# Patient Record
Sex: FEMALE | Race: WHITE | NOT HISPANIC OR LATINO | Employment: FULL TIME | URBAN - METROPOLITAN AREA
[De-identification: names, ages, dates, MRNs, and addresses within clinical notes are randomized per-mention and may not be internally consistent; named-entity substitution may affect disease eponyms.]

---

## 2017-01-03 ENCOUNTER — TELEPHONE (OUTPATIENT)
Dept: FAMILY MEDICINE | Facility: CLINIC | Age: 37
End: 2017-01-03

## 2017-01-03 DIAGNOSIS — R10.11 RUQ ABDOMINAL PAIN: Primary | ICD-10-CM

## 2017-01-03 NOTE — TELEPHONE ENCOUNTER
"Dr. Mar: Patient reports that she is \"still having severe pain for one hour after I  Eat.\" She said that she also has a constnat nauseated feeling. Has not thrown up. \"I get hungry so I try to eat and then it starts this whole viscious cycle.\" She reports Pain score of a 6 right after eatting. After an hour it goes down to a 3. She said that the nausea wakes her up every 2 hours at night. She said that her weight this morning was 148 pounds. Eatting bland foods \"like soup and mashed potaotes.\" She says, \"I have had low grade pain  going on for months. Then it flared up.\" She said it has not changed since 12/29/16. Denies fever. AFter giving her the normal ultrasound results, she commented, \"I am so surprised that it did not show anything wrong with my gallbladder.\" How do you advise persistent RUQ pain? Thank you.  Jarred March RN    "

## 2017-01-03 NOTE — TELEPHONE ENCOUNTER
Kesha had a US done on 12/30/16 and still has not heard results.  She is wondering what she should be doing as she is in severe pain yet.  Please call and assess. Thank you..Ghazal Arcos

## 2017-01-04 NOTE — TELEPHONE ENCOUNTER
Please call Kendra. I ordered the scan on the gallbladder. Sorry I did not look at this please let her know i was home and have her schedule this. Pia Mar M.D.;

## 2017-01-06 ENCOUNTER — TRANSFERRED RECORDS (OUTPATIENT)
Dept: HEALTH INFORMATION MANAGEMENT | Facility: CLINIC | Age: 37
End: 2017-01-06

## 2017-01-09 ENCOUNTER — TELEPHONE (OUTPATIENT)
Dept: FAMILY MEDICINE | Facility: CLINIC | Age: 37
End: 2017-01-09

## 2017-01-09 DIAGNOSIS — K82.8 DYSFUNCTIONAL GALLBLADDER: Primary | ICD-10-CM

## 2017-01-09 NOTE — TELEPHONE ENCOUNTER
Dr. Mar, spoke with this pt who had a recent the hepatobiliary scan at [facility] and this pt is very concerned as she has not been able to eat normal food since 12-29-16 at last OV and is very hungry and thinks she has been losing weight. States that the pain is on the right side and hurts all over rating 6/10 and stated the pain exacerbated during the hepatobiliary test when the material was injected and she states she wanted to vomit. Pt states that started as dull ache over past 3 months dn now is becoming difficult to tolerate especially when she eats. Only able to drink fluids and smoothies. Wanting to know direction for course of action. Only taking tylenol for pain and very sparingly. Pt states that the nausea from the pain. Last BM was 2 days ago. Please provide instruction. Pt will come in to be evaluated if needed. Mushtaq

## 2017-01-09 NOTE — TELEPHONE ENCOUNTER
The gallbladder function is low normal. She may have problems with this but it is still functional . Did it hurt when they were doing the procedure? If it did then it is more likely to be the gallbladder.   If you are feeling better right now then I would just have you watch and see if this is coming back.  If it does then I want you to see the surgeon. Pia Mar M.D.;

## 2017-01-09 NOTE — TELEPHONE ENCOUNTER
I spoke to Kendra. She got VERY sick during the HIDA scan and after the infusion. Having a very hard time eating but she  Is very hungry. Will refer to surgery. Pia Mar M.D.;

## 2017-01-09 NOTE — TELEPHONE ENCOUNTER
Kesha is calling asking for her results of her scan she had done on 1/6/17 at [facility].  Results received and forwarded to Dr. Mar for review.  Please review and advise. Thank you..Ghazal Arcos

## 2017-01-09 NOTE — TELEPHONE ENCOUNTER
She is wondering what that means?  What is the next step?  Fady surgery - scope?  Please review and advise.  Thank you..Ghazal Arcos

## 2017-01-11 ENCOUNTER — OFFICE VISIT (OUTPATIENT)
Dept: SURGERY | Facility: CLINIC | Age: 37
End: 2017-01-11
Payer: COMMERCIAL

## 2017-01-11 VITALS
WEIGHT: 154 LBS | RESPIRATION RATE: 12 BRPM | DIASTOLIC BLOOD PRESSURE: 74 MMHG | BODY MASS INDEX: 24.17 KG/M2 | SYSTOLIC BLOOD PRESSURE: 113 MMHG | TEMPERATURE: 98 F | HEIGHT: 67 IN | HEART RATE: 61 BPM

## 2017-01-11 DIAGNOSIS — K82.8 BILIARY DYSKINESIA: Primary | ICD-10-CM

## 2017-01-11 PROCEDURE — 99204 OFFICE O/P NEW MOD 45 MIN: CPT | Performed by: SURGERY

## 2017-01-11 NOTE — PROGRESS NOTES
36-year-old female complaining of several month history of epigastric abdominal pain. Patient also reports nausea. She has tried antacids which did not help. Ultrasound was done showing no stones but a HIDA scan showed a reproduction of symptoms. Her ejection fraction was 40%.she denies history of scleral icterus or changes in bowel habits. Pain is localized epigastrium, 4 out of 10.    Patient Active Problem List   Diagnosis     FAMILY HISTORY OF ENDOCRINE DISEASE( other endo or metabol)     Irratability     CARDIOVASCULAR SCREENING; LDL GOAL LESS THAN 160     Adjustment disorder with anxiety     Bipolar disorder, current episode hypomanic (H)     Hypothyroidism       Past Medical History   Diagnosis Date     Thyroid disease        Past Surgical History   Procedure Laterality Date     C repair cruciate ligament,knee       Left acl repair     C/section, low transverse       , Low Transverse     C/section, low transverse       , Low Transverse     Tubal ligation       With C/S     Mammoplasty augmentation bilateral  2010     Implants.       Family History   Problem Relation Age of Onset     Thyroid Disease Mother      Arthritis Mother      Hypertension Mother      Thyroid Disease Maternal Grandmother      Hypertension Maternal Grandmother      Cardiovascular Maternal Grandfather      HEART DISEASE Maternal Grandfather      Hypertension Maternal Grandfather      DIABETES Mother      Hypertension Paternal Grandmother      CEREBROVASCULAR DISEASE Paternal Grandfather      Anxiety Disorder Mother      Obesity Mother      Obesity Sister        Social History   Substance Use Topics     Smoking status: Former Smoker     Types: Cigarettes     Quit date: 2016     Smokeless tobacco: Never Used     Alcohol Use: Yes      Comment: Once a month         Smoking - Counseled patient on the effects of smoking on surgical issues such as wound healing and infection rates.    History   Drug  Use No       Current Outpatient Prescriptions   Medication Sig Dispense Refill     OMEPRAZOLE PO Take by mouth daily       ZOLMitriptan (ZOMIG) 5 MG tablet Take 1 tablet (5 mg) by mouth at onset of headache for migraine May repeat in 2 hours. Max 2 tablets/24 hours. 6 tablet 1     medroxyPROGESTERone (DEPO-PROVERA) 150 MG/ML injection Inject 1 mL (150 mg) into the muscle every 3 months 3 mL 3     spironolactone (ALDACTONE) 50 MG tablet Take 1 tablet (50 mg) by mouth daily 90 tablet 3     levothyroxine (SYNTHROID, LEVOTHROID) 75 MCG tablet Take 1 tablet (75 mcg) by mouth daily 90 tablet 3     clonazePAM (KLONOPIN) 0.5 MG tablet Take 0.5-1 tablets (0.25-0.5 mg) by mouth nightly as needed for other (insomnia) 20 tablet 0     ammonium lactate (LAC-HYDRIN) 12 % cream Apply topically 2 times daily 120 g 11     lithium 600 MG capsule Take 600 mg by mouth At Bedtime.         Allergies   Allergen Reactions     Erythromycin Nausea and Vomiting     CBC  Recent Labs   Lab Test  12/23/16   1830   WBC  9.9   RBC  4.05   HGB  12.4   HCT  37.8   MCV  93   MCH  30.6   MCHC  32.8   RDW  11.8   PLT  284       BMP  Recent Labs   Lab Test  12/23/16   1830   NA  138   POTASSIUM  3.6   JAIDA  8.7   CHLORIDE  107   CO2  21   BUN  20   CR  0.73   GLC  90       LFTs  Recent Labs   Lab Test  12/23/16   1830   PROTTOTAL  7.2   ALBUMIN  3.6   BILITOTAL  0.4   ALKPHOS  50   AST  17   ALT  15       Results for orders placed or performed during the hospital encounter of 12/30/16   US Abdomen Limited    Narrative    US ABDOMEN LIMITED 12/30/2016 10:29 AM    HISTORY: Right upper quadrant pain.    TECHNIQUE: Limited abdominal ultrasound to the right upper quadrant is  performed.    COMPARISON: None.    FINDINGS: No gallstones, gallbladder wall thickening, or biliary  dilatation are seen. The liver shows no focal finding. The pancreas  and right kidney are within normal limits.        Impression    IMPRESSION:  Normal right upper quadrant ultrasound.   "    MD MABEL GONZALES  Constitutional - Denies fevers, weight loss, malaise, lethargy  Neuro - Denies tremors or seizures  Pulmon - Denies SOB, dyspnea, hemoptysis, chronic cough or use of an inhaler  CV - Denies CP, SOB, lower extremity edema, difficulty w/ stairs, has never used NTG  GI - Denies hematemesis, BRBPR, melena, chronic diarrhea or epigastric pain   - Denies hematuria, difficulty voiding, h/o STDs  Hematology - Denies blood clotting disorders, chronic anemias  Dermatology - No melanomas or skin cancers  Rheumatology - No h/o RA  Pysch - Denies depression, bipolar d/o or schizophrenia    Exam:/74 mmHg  Pulse 61  Temp(Src) 98  F (36.7  C) (Oral)  Resp 12  Ht 1.702 m (5' 7\")  Wt 69.854 kg (154 lb)  BMI 24.11 kg/m2  LMP 12/20/2016 (Exact Date)    General - Alert and Oriented X4, NAD, well nourished  HEENT - Normocephalic, atraumatic, PERRLA, Nose midline, Throat without lesions  Neck - supple, no LAD, Thyroid normal, Carotids without bruits  Lungs - Clear to auscultation bilaterally with good inspiratory effort, no tactile fremitus  CV - Heart RRR, no lift's, thrills, murmurs, rubs, or gallops. Carotid, radial, and femoral pulses 2+ bilaterally  Abdomen - Soft, non-tender, +BS, no hepatosplenomegaly, no palpable masses  Neuro - Full ROM, Strength 5/5 and major muscle groups, sensation intact  Extremities - No cyanosis, clubbing or edema    Assessment and plan: 28-year-old female with biliary dyskinesia. Patient has good candidate for laparoscopic cholecystectomy. Risks benefits alternatives and complications were discussed with the patient including the possibility of infection, bleeding,or bile leak. Patient understood and wished to proceed. PATIENT IS CLEARED FOR SURGERY.    Tani Brenner MD     "

## 2017-01-11 NOTE — Clinical Note
SURGERYPLANNING/SCHEDULING WORKSHEET                              [facility]  [address] [address] [phone]                          Kesha Carter                :  1980  MRN:  [id number]  Phone: [phone] (home)     Same Day Surgery   Surgeon: Tani Brenner MD  Diagnosis:   Biliary dyskinesia  Allergies:  Erythromycin   A preoperative evaluation and physical will be done by this office.   ====================================================  Surgical Procedure:  General Surgery:lap xiang  Length of Procedure:  1 hour  Type of anesthesia:  General  The proposed surgical procedure is considered INTERMEDIATE risk.  Date of Procedure:___17____    Time: __9:30AM___________       Special Equipment: None  Informed Consent Obtained and Signed:  NO  ====================================================  Instructions to Same Day Surgery Staff  Pneumoboots  Preop Antibiotic:  Ancef 2 gm IV  pre-op within one hour prior to incision For > 80 kg  Preop Pain Meds:  None  Preop Orders:  Routine Standing Orders.  ====================================================  Instructions to the patient:  Preop physical exam scheduled (within 30 days or 7 days prior) with:  Dr. BASURTO__________________  Clinic:  ____________________                                         Date______________Time_________________________  Come to the hospital at: ___8:30AM_____________________________  HOME PREPARATION:   Shower with Hibiclens the night before or the morning of surgery, gently cleaning skin from neck to feet  Bathe and brush teeth the morning of surgery.  Take medications with a sip of water the morning of surgery:   Check with DrMontana if taking insulin.  May have  a light meal, toast and clear liquids, up to 8 hrs before surgery  May have clear liquids (liquids one can read through) up to 4 hrs before surgery  NOTHING after 4  hrs before surgery  Stop aspirin 7-10 days before surgery  Stop NSAIDS (Ibuproven, Naproxen, etc) 5 days before surgery  Stop Plavix 7-10 days before surgery  Need to arrange for a     Tani Brenner MD    1/11/2017  This form was electronically signed at chart closure                                                                        Chart Copy

## 2017-01-11 NOTE — NURSING NOTE
"Chief Complaint   Patient presents with     Consult     Gallbladder       Initial /74 mmHg  Pulse 61  Temp(Src) 98  F (36.7  C) (Oral)  Resp 12  Ht 1.702 m (5' 7\")  Wt 69.854 kg (154 lb)  BMI 24.11 kg/m2  LMP 12/20/2016 (Exact Date) Estimated body mass index is 24.11 kg/(m^2) as calculated from the following:    Height as of this encounter: 1.702 m (5' 7\").    Weight as of this encounter: 69.854 kg (154 lb).  BP completed using cuff size: regular    Kari Mcdonough MA      "

## 2017-01-16 ENCOUNTER — ANESTHESIA EVENT (OUTPATIENT)
Dept: SURGERY | Facility: CLINIC | Age: 37
End: 2017-01-16
Payer: COMMERCIAL

## 2017-01-17 ENCOUNTER — HOSPITAL ENCOUNTER (OUTPATIENT)
Facility: CLINIC | Age: 37
Discharge: HOME | End: 2017-01-17
Attending: SURGERY | Admitting: SURGERY
Payer: COMMERCIAL

## 2017-01-17 ENCOUNTER — ANESTHESIA (OUTPATIENT)
Dept: SURGERY | Facility: CLINIC | Age: 37
End: 2017-01-17
Payer: COMMERCIAL

## 2017-01-17 VITALS
TEMPERATURE: 98.6 F | RESPIRATION RATE: 16 BRPM | SYSTOLIC BLOOD PRESSURE: 119 MMHG | BODY MASS INDEX: 23.63 KG/M2 | OXYGEN SATURATION: 100 % | DIASTOLIC BLOOD PRESSURE: 76 MMHG | WEIGHT: 147 LBS | HEART RATE: 59 BPM | HEIGHT: 66 IN

## 2017-01-17 DIAGNOSIS — G89.18 POST-OP PAIN: Primary | ICD-10-CM

## 2017-01-17 PROCEDURE — 25000125 ZZHC RX 250: Performed by: NURSE ANESTHETIST, CERTIFIED REGISTERED

## 2017-01-17 PROCEDURE — 25000125 ZZHC RX 250: Performed by: SURGERY

## 2017-01-17 PROCEDURE — 25000128 H RX IP 250 OP 636: Performed by: NURSE ANESTHETIST, CERTIFIED REGISTERED

## 2017-01-17 PROCEDURE — 25800025 ZZH RX 258: Performed by: NURSE ANESTHETIST, CERTIFIED REGISTERED

## 2017-01-17 PROCEDURE — 36000058 ZZH SURGERY LEVEL 3 EA 15 ADDTL MIN: Performed by: SURGERY

## 2017-01-17 PROCEDURE — 40000306 ZZH STATISTIC PRE PROC ASSESS II: Performed by: SURGERY

## 2017-01-17 PROCEDURE — 71000013 ZZH RECOVERY PHASE 1 LEVEL 1 EA ADDTL HR: Performed by: SURGERY

## 2017-01-17 PROCEDURE — 27210794 ZZH OR GENERAL SUPPLY STERILE: Performed by: SURGERY

## 2017-01-17 PROCEDURE — 88304 TISSUE EXAM BY PATHOLOGIST: CPT | Mod: 26 | Performed by: SURGERY

## 2017-01-17 PROCEDURE — 27110028 ZZH OR GENERAL SUPPLY NON-STERILE: Performed by: SURGERY

## 2017-01-17 PROCEDURE — 25000132 ZZH RX MED GY IP 250 OP 250 PS 637: Performed by: SURGERY

## 2017-01-17 PROCEDURE — 71000012 ZZH RECOVERY PHASE 1 LEVEL 1 FIRST HR: Performed by: SURGERY

## 2017-01-17 PROCEDURE — 37000009 ZZH ANESTHESIA TECHNICAL FEE, EACH ADDTL 15 MIN: Performed by: SURGERY

## 2017-01-17 PROCEDURE — 36000056 ZZH SURGERY LEVEL 3 1ST 30 MIN: Performed by: SURGERY

## 2017-01-17 PROCEDURE — 25000566 ZZH SEVOFLURANE, EA 15 MIN: Performed by: SURGERY

## 2017-01-17 PROCEDURE — 71000027 ZZH RECOVERY PHASE 2 EACH 15 MINS: Performed by: SURGERY

## 2017-01-17 PROCEDURE — 25000132 ZZH RX MED GY IP 250 OP 250 PS 637: Performed by: NURSE ANESTHETIST, CERTIFIED REGISTERED

## 2017-01-17 PROCEDURE — 37000008 ZZH ANESTHESIA TECHNICAL FEE, 1ST 30 MIN: Performed by: SURGERY

## 2017-01-17 PROCEDURE — 88304 TISSUE EXAM BY PATHOLOGIST: CPT | Performed by: SURGERY

## 2017-01-17 PROCEDURE — 47562 LAPAROSCOPIC CHOLECYSTECTOMY: CPT | Performed by: SURGERY

## 2017-01-17 RX ORDER — HYDROCODONE BITARTRATE AND ACETAMINOPHEN 5; 325 MG/1; MG/1
1-2 TABLET ORAL EVERY 6 HOURS PRN
Status: DISCONTINUED | OUTPATIENT
Start: 2017-01-17 | End: 2017-01-17 | Stop reason: HOSPADM

## 2017-01-17 RX ORDER — SCOLOPAMINE TRANSDERMAL SYSTEM 1 MG/1
1 PATCH, EXTENDED RELEASE TRANSDERMAL ONCE
Status: COMPLETED | OUTPATIENT
Start: 2017-01-17 | End: 2017-01-17

## 2017-01-17 RX ORDER — HYDROCODONE BITARTRATE AND ACETAMINOPHEN 5; 325 MG/1; MG/1
1-2 TABLET ORAL EVERY 6 HOURS PRN
Qty: 45 TABLET | Refills: 0 | Status: SHIPPED | OUTPATIENT
Start: 2017-01-17 | End: 2017-04-12

## 2017-01-17 RX ORDER — DEXAMETHASONE SODIUM PHOSPHATE 4 MG/ML
INJECTION, SOLUTION INTRA-ARTICULAR; INTRALESIONAL; INTRAMUSCULAR; INTRAVENOUS; SOFT TISSUE PRN
Status: DISCONTINUED | OUTPATIENT
Start: 2017-01-17 | End: 2017-01-17

## 2017-01-17 RX ORDER — NALOXONE HYDROCHLORIDE 0.4 MG/ML
.1-.4 INJECTION, SOLUTION INTRAMUSCULAR; INTRAVENOUS; SUBCUTANEOUS
Status: DISCONTINUED | OUTPATIENT
Start: 2017-01-17 | End: 2017-01-17 | Stop reason: HOSPADM

## 2017-01-17 RX ORDER — PROPOFOL 10 MG/ML
INJECTION, EMULSION INTRAVENOUS PRN
Status: DISCONTINUED | OUTPATIENT
Start: 2017-01-17 | End: 2017-01-17

## 2017-01-17 RX ORDER — ONDANSETRON 2 MG/ML
INJECTION INTRAMUSCULAR; INTRAVENOUS PRN
Status: DISCONTINUED | OUTPATIENT
Start: 2017-01-17 | End: 2017-01-17

## 2017-01-17 RX ORDER — DEXAMETHASONE SODIUM PHOSPHATE 4 MG/ML
4 INJECTION, SOLUTION INTRA-ARTICULAR; INTRALESIONAL; INTRAMUSCULAR; INTRAVENOUS; SOFT TISSUE EVERY 10 MIN PRN
Status: DISCONTINUED | OUTPATIENT
Start: 2017-01-17 | End: 2017-01-17 | Stop reason: HOSPADM

## 2017-01-17 RX ORDER — LIDOCAINE 40 MG/G
CREAM TOPICAL
Status: DISCONTINUED | OUTPATIENT
Start: 2017-01-17 | End: 2017-01-17 | Stop reason: HOSPADM

## 2017-01-17 RX ORDER — FENTANYL CITRATE 50 UG/ML
INJECTION, SOLUTION INTRAMUSCULAR; INTRAVENOUS PRN
Status: DISCONTINUED | OUTPATIENT
Start: 2017-01-17 | End: 2017-01-17

## 2017-01-17 RX ORDER — FENTANYL CITRATE 50 UG/ML
25-50 INJECTION, SOLUTION INTRAMUSCULAR; INTRAVENOUS
Status: DISCONTINUED | OUTPATIENT
Start: 2017-01-17 | End: 2017-01-17 | Stop reason: HOSPADM

## 2017-01-17 RX ORDER — KETOROLAC TROMETHAMINE 30 MG/ML
INJECTION, SOLUTION INTRAMUSCULAR; INTRAVENOUS PRN
Status: DISCONTINUED | OUTPATIENT
Start: 2017-01-17 | End: 2017-01-17

## 2017-01-17 RX ORDER — CEFAZOLIN SODIUM 2 G/100ML
2 INJECTION, SOLUTION INTRAVENOUS
Status: COMPLETED | OUTPATIENT
Start: 2017-01-17 | End: 2017-01-17

## 2017-01-17 RX ORDER — HYDROXYZINE HYDROCHLORIDE 50 MG/1
50 TABLET, FILM COATED ORAL EVERY 6 HOURS PRN
Status: DISCONTINUED | OUTPATIENT
Start: 2017-01-17 | End: 2017-01-17 | Stop reason: HOSPADM

## 2017-01-17 RX ORDER — ALBUTEROL SULFATE 0.83 MG/ML
2.5 SOLUTION RESPIRATORY (INHALATION) EVERY 4 HOURS PRN
Status: DISCONTINUED | OUTPATIENT
Start: 2017-01-17 | End: 2017-01-17 | Stop reason: HOSPADM

## 2017-01-17 RX ORDER — MEPERIDINE HYDROCHLORIDE 25 MG/ML
12.5 INJECTION INTRAMUSCULAR; INTRAVENOUS; SUBCUTANEOUS
Status: DISCONTINUED | OUTPATIENT
Start: 2017-01-17 | End: 2017-01-17 | Stop reason: HOSPADM

## 2017-01-17 RX ORDER — HYDROXYZINE HYDROCHLORIDE 25 MG/1
25 TABLET, FILM COATED ORAL EVERY 6 HOURS PRN
Status: DISCONTINUED | OUTPATIENT
Start: 2017-01-17 | End: 2017-01-17 | Stop reason: HOSPADM

## 2017-01-17 RX ORDER — HYDROMORPHONE HYDROCHLORIDE 1 MG/ML
.3-.5 INJECTION, SOLUTION INTRAMUSCULAR; INTRAVENOUS; SUBCUTANEOUS EVERY 10 MIN PRN
Status: DISCONTINUED | OUTPATIENT
Start: 2017-01-17 | End: 2017-01-17 | Stop reason: HOSPADM

## 2017-01-17 RX ORDER — SODIUM CHLORIDE, SODIUM LACTATE, POTASSIUM CHLORIDE, CALCIUM CHLORIDE 600; 310; 30; 20 MG/100ML; MG/100ML; MG/100ML; MG/100ML
1000 INJECTION, SOLUTION INTRAVENOUS CONTINUOUS
Status: DISCONTINUED | OUTPATIENT
Start: 2017-01-17 | End: 2017-01-17 | Stop reason: HOSPADM

## 2017-01-17 RX ORDER — SODIUM CHLORIDE, SODIUM LACTATE, POTASSIUM CHLORIDE, CALCIUM CHLORIDE 600; 310; 30; 20 MG/100ML; MG/100ML; MG/100ML; MG/100ML
INJECTION, SOLUTION INTRAVENOUS CONTINUOUS
Status: DISCONTINUED | OUTPATIENT
Start: 2017-01-17 | End: 2017-01-17 | Stop reason: HOSPADM

## 2017-01-17 RX ORDER — BUPIVACAINE HYDROCHLORIDE AND EPINEPHRINE 2.5; 5 MG/ML; UG/ML
INJECTION, SOLUTION INFILTRATION; PERINEURAL PRN
Status: DISCONTINUED | OUTPATIENT
Start: 2017-01-17 | End: 2017-01-17 | Stop reason: HOSPADM

## 2017-01-17 RX ORDER — GLYCOPYRROLATE 0.2 MG/ML
INJECTION, SOLUTION INTRAMUSCULAR; INTRAVENOUS PRN
Status: DISCONTINUED | OUTPATIENT
Start: 2017-01-17 | End: 2017-01-17

## 2017-01-17 RX ORDER — CEFAZOLIN SODIUM 1 G/3ML
1 INJECTION, POWDER, FOR SOLUTION INTRAMUSCULAR; INTRAVENOUS SEE ADMIN INSTRUCTIONS
Status: DISCONTINUED | OUTPATIENT
Start: 2017-01-17 | End: 2017-01-17 | Stop reason: HOSPADM

## 2017-01-17 RX ORDER — ONDANSETRON 2 MG/ML
4 INJECTION INTRAMUSCULAR; INTRAVENOUS EVERY 30 MIN PRN
Status: DISCONTINUED | OUTPATIENT
Start: 2017-01-17 | End: 2017-01-17 | Stop reason: HOSPADM

## 2017-01-17 RX ORDER — ALBUTEROL SULFATE 0.83 MG/ML
2.5 SOLUTION RESPIRATORY (INHALATION)
Status: DISCONTINUED | OUTPATIENT
Start: 2017-01-17 | End: 2017-01-17 | Stop reason: HOSPADM

## 2017-01-17 RX ORDER — ONDANSETRON 4 MG/1
4 TABLET, ORALLY DISINTEGRATING ORAL EVERY 30 MIN PRN
Status: DISCONTINUED | OUTPATIENT
Start: 2017-01-17 | End: 2017-01-17 | Stop reason: HOSPADM

## 2017-01-17 RX ADMIN — HYDROCODONE BITARTRATE AND ACETAMINOPHEN 2 TABLET: 5; 325 TABLET ORAL at 11:59

## 2017-01-17 RX ADMIN — SCOPALAMINE 1 PATCH: 1 PATCH, EXTENDED RELEASE TRANSDERMAL at 11:03

## 2017-01-17 RX ADMIN — ONDANSETRON 4 MG: 2 INJECTION INTRAMUSCULAR; INTRAVENOUS at 10:20

## 2017-01-17 RX ADMIN — MIDAZOLAM HYDROCHLORIDE 1 MG: 1 INJECTION, SOLUTION INTRAMUSCULAR; INTRAVENOUS at 09:09

## 2017-01-17 RX ADMIN — DEXAMETHASONE SODIUM PHOSPHATE 4 MG: 4 INJECTION, SOLUTION INTRAMUSCULAR; INTRAVENOUS at 09:09

## 2017-01-17 RX ADMIN — KETOROLAC TROMETHAMINE 30 MG: 30 INJECTION, SOLUTION INTRAMUSCULAR; INTRAVENOUS at 09:58

## 2017-01-17 RX ADMIN — MIDAZOLAM HYDROCHLORIDE 2 MG: 1 INJECTION, SOLUTION INTRAMUSCULAR; INTRAVENOUS at 09:05

## 2017-01-17 RX ADMIN — MIDAZOLAM HYDROCHLORIDE 2 MG: 1 INJECTION, SOLUTION INTRAMUSCULAR; INTRAVENOUS at 09:02

## 2017-01-17 RX ADMIN — ROCURONIUM BROMIDE 30 MG: 10 INJECTION INTRAVENOUS at 09:09

## 2017-01-17 RX ADMIN — FENTANYL CITRATE 100 MCG: 50 INJECTION, SOLUTION INTRAMUSCULAR; INTRAVENOUS at 09:02

## 2017-01-17 RX ADMIN — HYDROXYZINE HYDROCHLORIDE 50 MG: 50 TABLET, FILM COATED ORAL at 10:38

## 2017-01-17 RX ADMIN — LIDOCAINE HYDROCHLORIDE 1 ML: 10 INJECTION, SOLUTION INFILTRATION; PERINEURAL at 08:27

## 2017-01-17 RX ADMIN — FENTANYL CITRATE 100 MCG: 50 INJECTION, SOLUTION INTRAMUSCULAR; INTRAVENOUS at 09:34

## 2017-01-17 RX ADMIN — HYDROMORPHONE HYDROCHLORIDE 0.5 MG: 1 INJECTION, SOLUTION INTRAMUSCULAR; INTRAVENOUS; SUBCUTANEOUS at 10:55

## 2017-01-17 RX ADMIN — SODIUM CHLORIDE, POTASSIUM CHLORIDE, SODIUM LACTATE AND CALCIUM CHLORIDE: 600; 310; 30; 20 INJECTION, SOLUTION INTRAVENOUS at 09:58

## 2017-01-17 RX ADMIN — GLYCOPYRROLATE 0.2 MG: 0.2 INJECTION, SOLUTION INTRAMUSCULAR; INTRAVENOUS at 09:09

## 2017-01-17 RX ADMIN — FENTANYL CITRATE 100 MCG: 50 INJECTION, SOLUTION INTRAMUSCULAR; INTRAVENOUS at 09:09

## 2017-01-17 RX ADMIN — LIDOCAINE HYDROCHLORIDE 50 MG: 10 INJECTION, SOLUTION INFILTRATION; PERINEURAL at 09:09

## 2017-01-17 RX ADMIN — SODIUM CHLORIDE, POTASSIUM CHLORIDE, SODIUM LACTATE AND CALCIUM CHLORIDE 1000 ML: 600; 310; 30; 20 INJECTION, SOLUTION INTRAVENOUS at 08:27

## 2017-01-17 RX ADMIN — FENTANYL CITRATE 50 MCG: 50 INJECTION INTRAMUSCULAR; INTRAVENOUS at 10:25

## 2017-01-17 RX ADMIN — FENTANYL CITRATE 50 MCG: 50 INJECTION, SOLUTION INTRAMUSCULAR; INTRAVENOUS at 09:14

## 2017-01-17 RX ADMIN — PROPOFOL 200 MG: 10 INJECTION, EMULSION INTRAVENOUS at 09:09

## 2017-01-17 RX ADMIN — CEFAZOLIN SODIUM 2 G: 2 INJECTION, SOLUTION INTRAVENOUS at 09:02

## 2017-01-17 RX ADMIN — ONDANSETRON 4 MG: 2 INJECTION INTRAMUSCULAR; INTRAVENOUS at 09:09

## 2017-01-17 RX ADMIN — HYDROMORPHONE HYDROCHLORIDE 1 MG: 1 INJECTION, SOLUTION INTRAMUSCULAR; INTRAVENOUS; SUBCUTANEOUS at 09:27

## 2017-01-17 NOTE — BRIEF OP NOTE
PreOp Dx: Biliary dyskinesia    PostOp Dx: Same    Procedure: lap xiang    Surgeon: ROSI Brenner    Anesthesia: SHEREEN Diaz CRNA    Findings: Gb without inflammation    IVF: 1000ml    UOP: n/a    EBL: 5ml      Tani Brenner MD

## 2017-01-17 NOTE — IP AVS SNAPSHOT
[facility] PreOP/Phase II    [address] [address]    Phone:  [phone]    Fax:  [phone]                                       After Visit Summary   1/17/2017    Kesha Carter    MRN: [id number]           After Visit Summary Signature Page     I have received my discharge instructions, and my questions have been answered. I have discussed any challenges I see with this plan with the nurse or doctor.    ..........................................................................................................................................  Patient/Patient Representative Signature      ..........................................................................................................................................  Patient Representative Print Name and Relationship to Patient    ..................................................               ................................................  Date                                            Time    ..........................................................................................................................................  Reviewed by Signature/Title    ...................................................              ..............................................  Date                                                            Time

## 2017-01-17 NOTE — IP AVS SNAPSHOT
MRN:[id number]                      After Visit Summary   1/17/2017    Kesha Carter    MRN: [id number]           Thank you!     Thank you for choosing [facility] for your care. Our goal is always to provide you with excellent care. Hearing back from our patients is one way we can continue to improve our services. Please take a few minutes to complete the written survey that you may receive in the mail after you visit with us. Thank you!        Patient Information     Date Of Birth          1980        About your hospital stay     You were admitted on:  January 17, 2017 You last received care in the:  [facility] PreOP/Phase II    You were discharged on:  January 17, 2017       Who to Call     For medical emergencies, please call 911.  For non-urgent questions about your medical care, please call your primary care provider or clinic, [phone]  For questions related to your surgery, please call your surgery clinic        Attending Provider     Provider    Tani Brenner MD       Primary Care Provider Office Phone # Fax #    Pia Mar [phone]       [facility] [address] [address] [address]        Further instructions from your care team       Wash incisions daily with soap and water. Some mild redness or swelling is expected. If draining, cover with dry gauze.    No heavy lifting (less than 30 pounds) for 1 month.    Okay to use ice pack over wound as necessary for comfort.    Use pain medication as necessary but avoid constipating side effects. Ibuprofen okay but avoid Tylenol as your pain medication already contains this.    Diet as tolerated. No restrictions.    Follow up with Dr Brenner in 1-2 weeks.    Most people take the rest of the week off and return to work the following Monday. You may return sooner as pain allows. During your follow-up appointment, Dr. Brenner will give you a formal letter for your work with any restrictions detailed.  " All disability or other such paperwork will be addressed at that time.                                Same Day Surgery Discharge Instructions  Special Precautions After Surgery - Adult    1. It is not unusual to feel lightheaded or faint, up to 24 hours after surgery or while taking pain medication.  If you have these symptoms; sit for a few minutes before standing and have someone assist you when getting up.  2. You should rest and relax for the next 24 hours and must have someone stay with you for at least 24 hours after your discharge.  3. DO NOT DRIVE any vehicle or operate mechanical equipment for 24 hours following the end of your surgery.  DO NOT DRIVE while taking narcotic pain medications that have been prescribed by your physician.  If you had a limb operated on, you must be able to use it fully to drive.  4. DO NOT drink alcoholic beverages for 24 hours following surgery or while taking prescription pain medication.  5. Drink clear liquids (apple juice, ginger ale, broth, 7-Up, etc.).  Progress to your regular diet as you feel able.  6. Any questions call your physician and do not make important decisions for 24 hours.        [facility]:  [phone]     Same Day Surgery [phone], Monday thru Friday 6am-9pm.        Pending Results     Date and Time Order Name Status Description    1/17/2017 0941 Surgical pathology exam In process             Admission Information        Provider Department Dept Phone    1/17/2017 Tani Brenner MD [facility] Preop/Phase -[phone]      Your Vitals Were     Blood Pressure Pulse Temperature Respirations    131/88 mmHg 59 98.6  F (37  C) (Oral) 16    Height Weight BMI (Body Mass Index) Pulse Oximetry    1.676 m (5' 6\") 66.679 kg (147 lb) 23.74 kg/m2 97%    Last Period             12/20/2016         "[employer]" Information     "[employer]" gives you secure access to your electronic health record. If you see a primary care provider, you can also send messages to your " care team and make appointments. If you have questions, please call your primary care clinic.  If you do not have a primary care provider, please call [phone] and they will assist you.        Care EveryWhere ID     This is your Care EveryWhere ID. This could be used by other organizations to access your [facility] medical records  [id number]           Review of your medicines      START taking        Dose / Directions    HYDROcodone-acetaminophen 5-325 MG per tablet   Commonly known as:  NORCO   Used for:  Post-op pain   Notes to Patient:  Last dose was at noon.        Dose:  1-2 tablet   Take 1-2 tablets by mouth every 6 hours as needed   Quantity:  45 tablet   Refills:  0         CONTINUE these medicines which have NOT CHANGED        Dose / Directions    ammonium lactate 12 % cream   Commonly known as:  LAC-HYDRIN   Used for:  Keratosis pilaris        Apply topically 2 times daily   Quantity:  120 g   Refills:  11       clonazePAM 0.5 MG tablet   Commonly known as:  klonoPIN   Used for:  Persistent insomnia        Dose:  0.25-0.5 mg   Take 0.5-1 tablets (0.25-0.5 mg) by mouth nightly as needed for other (insomnia)   Quantity:  20 tablet   Refills:  0       levothyroxine 75 MCG tablet   Commonly known as:  SYNTHROID/LEVOTHROID   Used for:  Hypothyroidism due to acquired atrophy of thyroid        Dose:  75 mcg   Take 1 tablet (75 mcg) by mouth daily   Quantity:  90 tablet   Refills:  3       lithium 600 MG capsule        Dose:  600 mg   Take 600 mg by mouth At Bedtime.   Refills:  0       medroxyPROGESTERone 150 MG/ML injection   Commonly known as:  DEPO-PROVERA   Used for:  Menstrual migraine, intractable, without status migrainosus, Acne vulgaris, Irritability        Dose:  150 mg   Inject 1 mL (150 mg) into the muscle every 3 months   Quantity:  3 mL   Refills:  3       OMEPRAZOLE PO        Take by mouth daily   Refills:  0       spironolactone 50 MG tablet   Commonly known as:  ALDACTONE   Used for:  Acne  vulgaris        Dose:  50 mg   Take 1 tablet (50 mg) by mouth daily   Quantity:  90 tablet   Refills:  3       ZOLMitriptan 5 MG tablet   Commonly known as:  ZOMIG   Used for:  Menstrual migraine, intractable, without status migrainosus        Dose:  5 mg   Take 1 tablet (5 mg) by mouth at onset of headache for migraine May repeat in 2 hours. Max 2 tablets/24 hours.   Quantity:  6 tablet   Refills:  1            Where to get your medicines      Some of these will need a paper prescription and others can be bought over the counter. Ask your nurse if you have questions.     Bring a paper prescription for each of these medications    - HYDROcodone-acetaminophen 5-325 MG per tablet             Protect others around you: Learn how to safely use, store and throw away your medicines at www.disposemymeds.org.             Medication List: This is a list of all your medications and when to take them. Check marks below indicate your daily home schedule. Keep this list as a reference.      Medications           Morning Afternoon Evening Bedtime As Needed    ammonium lactate 12 % cream   Commonly known as:  LAC-HYDRIN   Apply topically 2 times daily                                clonazePAM 0.5 MG tablet   Commonly known as:  klonoPIN   Take 0.5-1 tablets (0.25-0.5 mg) by mouth nightly as needed for other (insomnia)                                HYDROcodone-acetaminophen 5-325 MG per tablet   Commonly known as:  NORCO   Take 1-2 tablets by mouth every 6 hours as needed   Last time this was given:  2 tablets on 1/17/2017 11:59 AM   Notes to Patient:  Last dose was at noon.                                levothyroxine 75 MCG tablet   Commonly known as:  SYNTHROID/LEVOTHROID   Take 1 tablet (75 mcg) by mouth daily                                lithium 600 MG capsule   Take 600 mg by mouth At Bedtime.                                medroxyPROGESTERone 150 MG/ML injection   Commonly known as:  DEPO-PROVERA   Inject 1 mL (150 mg)  into the muscle every 3 months                                OMEPRAZOLE PO   Take by mouth daily                                spironolactone 50 MG tablet   Commonly known as:  ALDACTONE   Take 1 tablet (50 mg) by mouth daily                                ZOLMitriptan 5 MG tablet   Commonly known as:  ZOMIG   Take 1 tablet (5 mg) by mouth at onset of headache for migraine May repeat in 2 hours. Max 2 tablets/24 hours.

## 2017-01-17 NOTE — OP NOTE
PROCEDURE/SERVICE DATE:  01/17/2017.      PREOPERATIVE DIAGNOSIS:  Biliary dyskinesia.      POSTOPERATIVE DIAGNOSIS:  Biliary dyskinesia.      PROCEDURE:  Laparoscopic cholecystectomy.      SURGEON:  Tani Brenner MD.      ANESTHESIA:  General.      ANESTHESIOLOGIST:  Camille Diaz CRNA.      FINDINGS:  Uninflamed gallbladder successfully removed laparoscopically.      INDICATIONS:  Kesha Carter is a 36-year-old female seen in my clinic complaining of symptoms consistent with biliary colic.  She had a HIDA scan showing a low ejection fraction.      CONSENT:  Risks, benefits, alternatives and complications were discussed with the patient, including the possibility of infection, bleeding or bile leak.  The patient understood and wished to proceed.      PROCEDURE:  The patient was taken to the operating room and placed in a supine position.  General endotracheal anesthesia was induced.  Surgical timeout was performed.  Two grams of Ancef were used as perioperative antibiotics.  Her abdomen was clipped of extraneous hair and cleaned and draped in a sterile manner.  Marcaine 0.25% with epinephrine was used to anesthetize all port sites.  A small subumbilical curvilinear incision was made and the subcutaneous tissue dissected to the fascia.  The fascia was opened sharply and a 12 mm Keila trocar inserted.  Carbon dioxide was insufflated to a pressure of 15 mmHg.  Under direct vision, a separate subxiphoid 11 mm trocar was placed, as were 2 right-sided 5 mm trocars.        Attention was turned to the right upper quadrant.  The gallbladder was easily located.  It was grasped and elevated.  There was no edema, and it appeared uninflamed.  The neck of the gallbladder had a small amount of fat.  This was dissected free until the cystic duct was located.  It was encircled, clipped twice proximally, once distally and ligated.  Anterior and posterior branches of the cystic artery were both clipped and ligated.   The gallbladder was then removed from the liver bed using electrocautery, placed in an EndoCatch bag and brought out through the subxiphoid port.  Three liters of warm normal saline solution was used to irrigate out the abdominal cavity.  This was sucked free until the effluent was clear.  Small bleeding in the liver bed was easily controlled with electrocautery.  A final inspection of the liver bed revealed no evidence of hemorrhage or leakage, and both cystic artery and duct stumps were intact with clips applied.  Finally, all trocars were removed under direct vision and the air allowed to desufflate.  The fascial defect of the subumbilical port was closed using an 0 Vicryl suture in a figure-of-eight fashion, and this was bolstered with a second 0 Vicryl on top of that and reinjected with Marcaine.  All wounds were irrigated with normal saline and the skin closed using 4-0 Vicryl in a subcuticular fashion and dressed with Dermabond.  The patient tolerated the procedure well, was extubated on the table and transferred to the PACU in stable condition.      PLAN:  Following a stable postoperative course, she will be discharged home with a regular diet.      ACTIVITY:  No heavy lifting for 1 month.      DISABILITY:  None.      MEDICATIONS:  Prescription written for Vicodin.      INSTRUCTIONS:  The patient advised to wash her wounds daily with soap and water and to follow up with me in 1-2 weeks.      ESTIMATED BLOOD LOSS:  5 mL.      INTRAVENOUS FLUIDS:  1,000 mL.         DESTINEE FU             D: 2017 10:04   T: 2017 11:48   MT: [id number]      Name:     CAIO PASTRANA   MRN:      [id number]        Account:        [id number]   :      1980           Procedure Date: 2017      Document: [id number]       cc: Pia Mar MD

## 2017-01-17 NOTE — ANESTHESIA POSTPROCEDURE EVALUATION
Patient: Kesha Carter    LAPAROSCOPIC CHOLECYSTECTOMY (N/A Abdomen)  Additional InformationProcedure(s):  Laparoscopic cholecystectomy - Wound Class: II-Clean Contaminated    Diagnosis:biliary dyskinesia  Diagnosis Additional Information: No value filed.    Anesthesia Type:  ETT, General    Note:  Anesthesia Post Evaluation    Patient location during evaluation: PACU  Patient participation: Able to fully participate in evaluation  Level of consciousness: awake  Pain management: adequate  Airway patency: patent  Cardiovascular status: acceptable  Respiratory status: acceptable  PONV: controlled             Last vitals:  Filed Vitals:    01/17/17 1145 01/17/17 1215 01/17/17 1228   BP: 131/88 107/81 119/76   Pulse:      Temp:      Resp: 16 16 16   SpO2: 97% 97% 100%       Electronically Signed By: ALLY Nielson CRNA  January 17, 2017  12:32 PM

## 2017-01-17 NOTE — ANESTHESIA CARE TRANSFER NOTE
Patient: Kesha Carter    LAPAROSCOPIC CHOLECYSTECTOMY (N/A Abdomen)  Additional InformationProcedure(s):  Laparoscopic cholecystectomy - Wound Class: II-Clean Contaminated    Diagnosis: biliary dyskinesia  Diagnosis Additional Information: No value filed.    Anesthesia Type:   ETT, General     Note:  Airway :Room Air  Patient transferred to:PACU        Vitals: (Last set prior to Anesthesia Care Transfer)              Electronically Signed By: ALLY Nielson CRNA  January 17, 2017  10:07 AM

## 2017-01-17 NOTE — ANESTHESIA PREPROCEDURE EVALUATION
Anesthesia Evaluation     . Pt has had prior anesthetic. Type: General      ROS/MED HX    ENT/Pulmonary:  - neg pulmonary ROS     Neurologic:       Cardiovascular:  - neg cardiovascular ROS       METS/Exercise Tolerance:  >4 METS   Hematologic:  - neg hematologic  ROS       Musculoskeletal:  - neg musculoskeletal ROS       GI/Hepatic:     (+) GERD Asymptomatic on medication,       Renal/Genitourinary:  - ROS Renal section negative       Endo:     (+) thyroid problem hypothyroidism, .      Psychiatric:  - neg psychiatric ROS       Infectious Disease:  - neg infectious disease ROS       Malignancy:      - no malignancy   Other:    - neg other ROS           Physical Exam  Normal systems: cardiovascular, pulmonary and dental    Airway   Mallampati: I  TM distance: >3 FB  Neck ROM: full    Dental     Cardiovascular       Pulmonary                     Anesthesia Plan      History & Physical Review  History and physical reviewed and following examination; no interval change.    ASA Status:  1 .    NPO Status:  > 8 hours    Plan for ETT and General with Intravenous and Propofol induction. Maintenance will be Balanced.    PONV prophylaxis:  Ondansetron (or other 5HT-3) and Dexamethasone or Solumedrol  Additional equipment: Videolaryngoscope      Postoperative Care      Consents  Anesthetic plan, risks, benefits and alternatives discussed with:  Patient..                          .

## 2017-01-18 LAB — COPATH REPORT: NORMAL

## 2017-01-25 ENCOUNTER — OFFICE VISIT (OUTPATIENT)
Dept: SURGERY | Facility: CLINIC | Age: 37
End: 2017-01-25
Payer: COMMERCIAL

## 2017-01-25 VITALS
TEMPERATURE: 97.9 F | SYSTOLIC BLOOD PRESSURE: 110 MMHG | HEART RATE: 71 BPM | HEIGHT: 66 IN | BODY MASS INDEX: 23.3 KG/M2 | WEIGHT: 145 LBS | DIASTOLIC BLOOD PRESSURE: 71 MMHG

## 2017-01-25 DIAGNOSIS — K82.8 BILIARY DYSKINESIA: Primary | ICD-10-CM

## 2017-01-25 PROCEDURE — 99024 POSTOP FOLLOW-UP VISIT: CPT | Performed by: SURGERY

## 2017-01-25 NOTE — PROGRESS NOTES
"No complaints.  Pain controlled with oral pain meds.    Smoking Intervention:  Patient was counseled today on the ill effects of smoking and it's effects on wound healing as well as other post-surgical morbidities.    /71 mmHg  Pulse 71  Temp(Src) 97.9  F (36.6  C) (Oral)  Ht 1.676 m (5' 6\")  Wt 65.772 kg (145 lb)  BMI 23.41 kg/m2  LMP 12/20/2016    Exam  [id number]  CTAB  RRR  S&NTND+BS, wounds - cdi s erythema  No CCE    A/P s/p lap xiang healing well.  No heavy lifting for 2 weeks.  RTC prn.    Tani Brenner MD   "

## 2017-01-25 NOTE — NURSING NOTE
"Initial /71 mmHg  Pulse 71  Temp(Src) 97.9  F (36.6  C) (Oral)  Ht 1.676 m (5' 6\")  Wt 65.772 kg (145 lb)  BMI 23.41 kg/m2  LMP 12/20/2016 Estimated body mass index is 23.41 kg/(m^2) as calculated from the following:    Height as of this encounter: 1.676 m (5' 6\").    Weight as of this encounter: 65.772 kg (145 lb). .    Dulce Rowley MA    "

## 2017-03-14 ENCOUNTER — TELEPHONE (OUTPATIENT)
Dept: FAMILY MEDICINE | Facility: CLINIC | Age: 37
End: 2017-03-14

## 2017-03-14 DIAGNOSIS — R11.0 NAUSEA: Primary | ICD-10-CM

## 2017-03-14 RX ORDER — ONDANSETRON 4 MG/1
4-8 TABLET, ORALLY DISINTEGRATING ORAL
Qty: 20 TABLET | Refills: 1 | Status: SHIPPED | OUTPATIENT
Start: 2017-03-14 | End: 2019-07-08

## 2017-03-14 NOTE — TELEPHONE ENCOUNTER
"Patient notified of Dr. Mar's comments below. Kendra said  [employer] or some store like that. \"Ox bile\" is the specific one she was looking at. How do you advise? Thank you.   Jarred March RN    "

## 2017-03-14 NOTE — TELEPHONE ENCOUNTER
I will send in some zofran for her. I don't know about the digestive enzymes. Was she going to take them from a store like [employer] or somewhere? She may be experiencing blood sugar changes but it sounds more like she is having some vertigo.

## 2017-03-14 NOTE — TELEPHONE ENCOUNTER
"Dr. Mar: Patient reports that since her surgery on 1/17/17; nausea comes and goes. \"It is worse when in the car. It starts out with a spinning sensation----it lasts about one and a half hours.No association to foods or times of day. If I eat tomatoes, cucumbers and turkey--I am OK but if I try anything other than fruit and vegetables; I get lower intestinal cramping and bloatting.\" She reports central abdomen post prandial overall discomfort at a 2 to a  5. Still able to go to work.  Denies fever. She says that she alternates between constipation and diarrhea. Denies mucous or blood in the stool.  She has been taking miralex daily for about 3-4 weeks. Drinking \"fine.\" Always \"super thirsty.\" Urinating OK. She said she wants to know   1. if she should be seen if she should see Dr. Mar or go back to Dr. Brenner?  2. OK to have an anti nausea medication?  3. Would like to try a digestive enzyme that would not interfere with her other medications  4. \"I wonder if I am having blood sugar dips because I am eatting so lean?\"  Your soonest opening is 3/20/17. How do you advise? Thank you.   Jarred March RN    "

## 2017-03-14 NOTE — TELEPHONE ENCOUNTER
Kesha had her gall baldder removed last month and she is still having digestive problems.    She is wondering 2 things:    1)  She is always nauseated.  Hoping to get some anti nausea medication to help with that.  2)  Wanting to take a digestive enzyme, but wanted to make sure it works well with all the rest of her medication before she takes anything.    Please call and assess. Thank you..Ghazal Arcos

## 2017-03-15 NOTE — TELEPHONE ENCOUNTER
I don't know much about Ox bile. I looked it up and it doesn't appear to be really bad. She could try it for a month or so. Pia Mar M.D.

## 2017-03-16 ENCOUNTER — ALLIED HEALTH/NURSE VISIT (OUTPATIENT)
Dept: FAMILY MEDICINE | Facility: CLINIC | Age: 37
End: 2017-03-16
Payer: COMMERCIAL

## 2017-03-16 DIAGNOSIS — Z30.42 ENCOUNTER FOR SURVEILLANCE OF INJECTABLE CONTRACEPTIVE: Primary | ICD-10-CM

## 2017-03-16 PROCEDURE — 99207 ZZC NO CHARGE NURSE ONLY: CPT

## 2017-03-16 PROCEDURE — 96372 THER/PROPH/DIAG INJ SC/IM: CPT

## 2017-04-12 ENCOUNTER — OFFICE VISIT (OUTPATIENT)
Dept: FAMILY MEDICINE | Facility: CLINIC | Age: 37
End: 2017-04-12
Payer: COMMERCIAL

## 2017-04-12 VITALS
TEMPERATURE: 99.3 F | BODY MASS INDEX: 22.82 KG/M2 | HEART RATE: 68 BPM | WEIGHT: 142 LBS | SYSTOLIC BLOOD PRESSURE: 132 MMHG | DIASTOLIC BLOOD PRESSURE: 89 MMHG | HEIGHT: 66 IN

## 2017-04-12 DIAGNOSIS — E03.4 HYPOTHYROIDISM DUE TO ACQUIRED ATROPHY OF THYROID: ICD-10-CM

## 2017-04-12 DIAGNOSIS — R11.0 NAUSEA: ICD-10-CM

## 2017-04-12 DIAGNOSIS — R10.13 ABDOMINAL PAIN, EPIGASTRIC: Primary | ICD-10-CM

## 2017-04-12 DIAGNOSIS — F31.74 BIPOLAR DISORDER, IN FULL REMISSION, MOST RECENT EPISODE MANIC (H): ICD-10-CM

## 2017-04-12 LAB
ALBUMIN SERPL-MCNC: 4.5 G/DL (ref 3.4–5)
ALP SERPL-CCNC: 62 U/L (ref 40–150)
ALT SERPL W P-5'-P-CCNC: 19 U/L (ref 0–50)
AMYLASE SERPL-CCNC: 47 U/L (ref 30–110)
ANION GAP SERPL CALCULATED.3IONS-SCNC: 11 MMOL/L (ref 3–14)
AST SERPL W P-5'-P-CCNC: 15 U/L (ref 0–45)
BILIRUB SERPL-MCNC: 0.4 MG/DL (ref 0.2–1.3)
BUN SERPL-MCNC: 14 MG/DL (ref 7–30)
CALCIUM SERPL-MCNC: 9.5 MG/DL (ref 8.5–10.1)
CHLORIDE SERPL-SCNC: 104 MMOL/L (ref 94–109)
CO2 SERPL-SCNC: 24 MMOL/L (ref 20–32)
CREAT SERPL-MCNC: 0.8 MG/DL (ref 0.52–1.04)
GFR SERPL CREATININE-BSD FRML MDRD: 80 ML/MIN/1.7M2
GLUCOSE SERPL-MCNC: 81 MG/DL (ref 70–99)
LIPASE SERPL-CCNC: 213 U/L (ref 73–393)
LITHIUM SERPL-SCNC: 0.4 MMOL/L (ref 0.6–1.2)
POTASSIUM SERPL-SCNC: 4.2 MMOL/L (ref 3.4–5.3)
PROT SERPL-MCNC: 8.1 G/DL (ref 6.8–8.8)
SODIUM SERPL-SCNC: 139 MMOL/L (ref 133–144)
TSH SERPL DL<=0.005 MIU/L-ACNC: 2.63 MU/L (ref 0.4–4)

## 2017-04-12 PROCEDURE — 99000 SPECIMEN HANDLING OFFICE-LAB: CPT | Performed by: FAMILY MEDICINE

## 2017-04-12 PROCEDURE — 83516 IMMUNOASSAY NONANTIBODY: CPT | Mod: 91 | Performed by: FAMILY MEDICINE

## 2017-04-12 PROCEDURE — 36415 COLL VENOUS BLD VENIPUNCTURE: CPT | Performed by: FAMILY MEDICINE

## 2017-04-12 PROCEDURE — 86255 FLUORESCENT ANTIBODY SCREEN: CPT | Mod: 90 | Performed by: FAMILY MEDICINE

## 2017-04-12 PROCEDURE — 80178 ASSAY OF LITHIUM: CPT | Performed by: FAMILY MEDICINE

## 2017-04-12 PROCEDURE — 84443 ASSAY THYROID STIM HORMONE: CPT | Performed by: FAMILY MEDICINE

## 2017-04-12 PROCEDURE — 82150 ASSAY OF AMYLASE: CPT | Performed by: FAMILY MEDICINE

## 2017-04-12 PROCEDURE — 83516 IMMUNOASSAY NONANTIBODY: CPT | Performed by: FAMILY MEDICINE

## 2017-04-12 PROCEDURE — 83690 ASSAY OF LIPASE: CPT | Performed by: FAMILY MEDICINE

## 2017-04-12 PROCEDURE — 80053 COMPREHEN METABOLIC PANEL: CPT | Performed by: FAMILY MEDICINE

## 2017-04-12 PROCEDURE — 99214 OFFICE O/P EST MOD 30 MIN: CPT | Mod: 24 | Performed by: FAMILY MEDICINE

## 2017-04-12 RX ORDER — MULTIPLE VITAMINS W/ MINERALS TAB 9MG-400MCG
1 TAB ORAL DAILY
Qty: 100 TABLET | Refills: 3 | COMMUNITY
Start: 2017-04-12

## 2017-04-12 RX ORDER — POLYETHYLENE GLYCOL 3350 17 G/17G
1 POWDER, FOR SOLUTION ORAL DAILY
COMMUNITY
End: 2023-04-06

## 2017-04-12 NOTE — PROGRESS NOTES
SUBJECTIVE:                                                    Kesha Carter is a 36 year old female who presents to clinic today for the following health issues:    Chief Complaint   Patient presents with     RECHECK     Follow up abdominal pain still pressent, very limited in what she can eat. Dizziness, still using zofran.        Problem list and histories reviewed & adjusted, as indicated.  Additional history: she is hungry and she wants to eat but when she tries to eat she gets nauseated   She will have loose stools and then constipation she has a very restricted diet of what she can eat right now, cucumbers turkey tomatoes and a few other things are ok but this has been going on for months.   Wt Readings from Last 5 Encounters:   17 142 lb (64.4 kg)   17 145 lb (65.8 kg)   17 147 lb (66.7 kg)   17 154 lb (69.9 kg)   16 154 lb (69.9 kg)     .  She is going to the psych tomorrow. She has not had a lithium level recnelty but her mood has been great. She is very even and there ar no concerns from her family.       Patient Active Problem List   Diagnosis     FAMILY HISTORY OF ENDOCRINE DISEASE( other endo or metabol)     Irratability     CARDIOVASCULAR SCREENING; LDL GOAL LESS THAN 160     Adjustment disorder with anxiety     Bipolar disorder, current episode hypomanic (H)     Hypothyroidism     Past Surgical History:   Procedure Laterality Date     C REPAIR CRUCIATE LIGAMENT,KNEE      Left acl repair     C/SECTION, LOW TRANSVERSE      , Low Transverse     C/SECTION, LOW TRANSVERSE      , Low Transverse     LAPAROSCOPIC CHOLECYSTECTOMY N/A 2017    Procedure: LAPAROSCOPIC CHOLECYSTECTOMY;  Surgeon: Tani Brenner MD;  Location: [facility]     MAMMOPLASTY AUGMENTATION BILATERAL  2010    Implants.     TUBAL LIGATION      With C/S       Social History   Substance Use Topics     Smoking status: Former Smoker     Types: Cigarettes     Quit date:  8/8/2016     Smokeless tobacco: Never Used     Alcohol use Yes      Comment: Once a month      Family History   Problem Relation Age of Onset     Thyroid Disease Mother      Arthritis Mother      Hypertension Mother      Thyroid Disease Maternal Grandmother      Hypertension Maternal Grandmother      Cardiovascular Maternal Grandfather      HEART DISEASE Maternal Grandfather      Hypertension Maternal Grandfather      DIABETES Mother      Hypertension Paternal Grandmother      CEREBROVASCULAR DISEASE Paternal Grandfather      Anxiety Disorder Mother      Obesity Mother      Obesity Sister            ROS:  CONSTITUTIONAL:losing weight due to the restricte ddiet   INTEGUMENTARY/SKIN: NEGATIVE for worrisome rashes, moles or lesions and NEGATIVE for hair loss, hirsutism and rash generalized and   ENT/MOUTH: NEGATIVE for ear, mouth and throat problems, hoarse voice, nasal congestion, postnasal drainage and sore throat  RESP:NEGATIVE for significant cough or SOB, hemoptysis, pleurisy and SOB/dyspnea  CV: NEGATIVE for chest pain, palpitations or peripheral edema, dyspnea on exertion and lower extremity edema  GI: POSITIVE for constipation, diarrhea, dyspepsia, Hx gallbladder trouble and weight loss and NEGATIVE for dysphagia, hematemesis, hematochezia, Hx colon polyps, Hx IBD, Hx stomach or duadenal ulcer, jaundice and melena  MUSCULOSKELETAL: NEGATIVE for significant arthralgias or myalgia, muscle cramps-nocturnal and paresthesias  NEURO: NEGATIVE for weakness, dizziness or paresthesias  ENDOCRINE: NEGATIVE for temperature intolerance, skin/hair changes, POSITIVE  for constipation, diarrhea, HX thyroid disease and weight loss and NEGATIVE for cold intolerance, hair loss, hot flashes, polydipsia, polyphagia and polyuria  HEME/ALLERGY/IMMUNE: NEGATIVE for bleeding problems  PSYCHIATRIC: NEGATIVE for changes in mood or affect    OBJECTIVE:                                                    /89  Pulse 68  Temp 99.3  F  "(37.4  C) (Tympanic)  Ht 5' 6\" (1.676 m)  Wt 142 lb (64.4 kg)  BMI 22.92 kg/m2 Body mass index is 22.92 kg/(m^2).   GENERAL APPEARANCE: healthy, alert and no distress  HENT: ear canals and TM's normal and nose and mouth without ulcers or lesions  NECK: no adenopathy, no asymmetry, masses, or scars and thyroid normal to palpation  RESP: lungs clear to auscultation - no rales, rhonchi or wheezes  CV: regular rates and rhythm, normal S1 S2, no S3 or S4 and no murmur, click or rub  ABDOMEN: bowel sounds normal, no hepatosplenomegaly or masses, liver span normal to percussion, no bruits heard, no scars, striae, dilated veins, rashes, or lesions and mildly tender over the epigastric area   MS: extremities normal- no gross deformities noted and no varicosities  SKIN: no suspicious lesions or rashes  NEURO: Normal strength and tone, mentation intact, speech normal and Romberg negative  PSYCH: mentation appears normal, affect normal/bright and worried  MENTAL STATUS EXAM:  Appearance/Behavior: No apparent distress, Neatly groomed, Dressed appropriately for weather and Appears stated age  Speech: Normal  Mood/Affect: normal affect  Insight: Adequate       ASSESSMENT/PLAN:                                                      1. Abdominal pain, epigastric  Will check for celiac as she also has thyroid disease. If all negative I would like her to see gastro. May need endoscopy?   - Tissue transglutaminase santino IgA and IgG  - Endomysial antibody IgA  - Deamidated Gliadin Peptide Santino IgA IgG  - Comprehensive metabolic panel  - TSH with free T4 reflex  - multivitamin, therapeutic with minerals (MULTI-VITAMIN) TABS tablet; Take 1 tablet by mouth daily  Dispense: 100 tablet; Refill: 3  - GASTROENTEROLOGY ADULT REF CONSULT ONLY    2. Nausea    - Tissue transglutaminase santino IgA and IgG  - Endomysial antibody IgA  - Deamidated Gliadin Peptide Santino IgA IgG  - Comprehensive metabolic panel  - TSH with free T4 reflex  - multivitamin, " therapeutic with minerals (MULTI-VITAMIN) TABS tablet; Take 1 tablet by mouth daily  Dispense: 100 tablet; Refill: 3  - Amylase  - Lipase  - GASTROENTEROLOGY ADULT REF CONSULT ONLY    3. Bipolar disorder, in full remission, most recent episode manic (H)  Check this and ask tomorrow if this could be part of the issue.   - Lithium level  - multivitamin, therapeutic with minerals (MULTI-VITAMIN) TABS tablet; Take 1 tablet by mouth daily  Dispense: 100 tablet; Refill: 3     reports that she quit smoking about 8 months ago. Her smoking use included Cigarettes. She has never used smokeless tobacco.          4. Hypothyroidism due to acquired atrophy of thyroid  As above will also check tsh due to the constipation issues this is new for her       Pia Mar M.D.  [facility]

## 2017-04-12 NOTE — MR AVS SNAPSHOT
After Visit Summary   4/12/2017    Kesha Carter    MRN: [id number]           Patient Information     Date Of Birth          1980        Visit Information        Provider Department      4/12/2017 4:00 PM Pia Mar MD [facility]        Today's Diagnoses     Abdominal pain, epigastric    -  1    Nausea        Bipolar disorder, in full remission, most recent episode manic (H)           Follow-ups after your visit        Additional Services     GASTROENTEROLOGY ADULT REF CONSULT ONLY       Preferred Location: [facility] [phone]      Please be aware that coverage of these services is subject to the terms and limitations of your health insurance plan.  Call member services at your health plan with any benefit or coverage questions.  Any procedures must be performed at a [place] facility OR coordinated by your clinic's referral office.    Please bring the following with you to your appointment:    (1) Any X-Rays, CTs or MRIs which have been performed.  Contact the facility where they were done to arrange for  prior to your scheduled appointment.    (2) List of current medications   (3) This referral request   (4) Any documents/labs given to you for this referral                  Who to contact     Normal or non-critical lab and imaging results will be communicated to you by MyChart, letter or phone within 4 business days after the clinic has received the results. If you do not hear from us within 7 days, please contact the clinic through MyChart or phone. If you have a critical or abnormal lab result, we will notify you by phone as soon as possible.  Submit refill requests through [employer] or call your pharmacy and they will forward the refill request to us. Please allow 3 business days for your refill to be completed.          If you need to speak with a  for additional information , please call: [phone]             Additional Information About  "Your Visit        MyChart Information     [employer] gives you secure access to your electronic health record. If you see a primary care provider, you can also send messages to your care team and make appointments. If you have questions, please call your primary care clinic.  If you do not have a primary care provider, please call [phone] and they will assist you.        Care EveryWhere ID     This is your Care EveryWhere ID. This could be used by other organizations to access your [place] medical records  [id number]        Your Vitals Were     Pulse Temperature Height BMI (Body Mass Index)          68 99.3  F (37.4  C) (Tympanic) 5' 6\" (1.676 m) 22.92 kg/m2         Blood Pressure from Last 3 Encounters:   04/12/17 132/89   01/25/17 110/71   01/17/17 119/76    Weight from Last 3 Encounters:   04/12/17 142 lb (64.4 kg)   01/25/17 145 lb (65.8 kg)   01/17/17 147 lb (66.7 kg)              We Performed the Following     Amylase     Comprehensive metabolic panel     Deamidated Gliadin Peptide Santino IgA IgG     Endomysial antibody IgA     GASTROENTEROLOGY ADULT REF CONSULT ONLY     Lipase     Lithium level     Tissue transglutaminase santino IgA and IgG     TSH with free T4 reflex        Primary Care Provider Office Phone # Fax #    Pia Mar [phone]       [facility] [address] [facility] [address]        Thank you!     Thank you for choosing [facility]  for your care. Our goal is always to provide you with excellent care. Hearing back from our patients is one way we can continue to improve our services. Please take a few minutes to complete the written survey that you may receive in the mail after your visit with us. Thank you!             Your Updated Medication List - Protect others around you: Learn how to safely use, store and throw away your medicines at www.disposemymeds.org.          This list is accurate as of: 4/12/17  4:51 PM.  Always use your most recent " med list.                   Brand Name Dispense Instructions for use    ammonium lactate 12 % cream    LAC-HYDRIN    120 g    Apply topically 2 times daily       clonazePAM 0.5 MG tablet    klonoPIN    20 tablet    Take 0.5-1 tablets (0.25-0.5 mg) by mouth nightly as needed for other (insomnia)       levothyroxine 75 MCG tablet    SYNTHROID/LEVOTHROID    90 tablet    Take 1 tablet (75 mcg) by mouth daily       lithium 600 MG capsule      Take 600 mg by mouth At Bedtime.       medroxyPROGESTERone 150 MG/ML injection    DEPO-PROVERA    3 mL    Inject 1 mL (150 mg) into the muscle every 3 months       MIRALAX powder   Generic drug:  polyethylene glycol      Take 1 capful by mouth daily       Multi-vitamin Tabs tablet     100 tablet    Take 1 tablet by mouth daily       OMEPRAZOLE PO      Take by mouth daily       ondansetron 4 MG ODT tab    ZOFRAN ODT    20 tablet    Take 1-2 tablets (4-8 mg) by mouth 3 times daily (before meals)       PHAZYME MAXIMUM STRENGTH 250 MG Caps   Generic drug:  Simethicone      Take by mouth as needed       PROBIOTIC DAILY PO          spironolactone 50 MG tablet    ALDACTONE    90 tablet    Take 1 tablet (50 mg) by mouth daily       ZOLMitriptan 5 MG tablet    ZOMIG    6 tablet    Take 1 tablet (5 mg) by mouth at onset of headache for migraine May repeat in 2 hours. Max 2 tablets/24 hours.

## 2017-04-17 ENCOUNTER — TELEPHONE (OUTPATIENT)
Dept: FAMILY MEDICINE | Facility: CLINIC | Age: 37
End: 2017-04-17

## 2017-04-17 LAB — ENDOMYSIUM AB SER QL: NORMAL

## 2017-04-17 NOTE — TELEPHONE ENCOUNTER
"FYI to Dr. Mar: Patient notified of normal lipase, amylase, cmp and low lithium; and still waitting for the other 3 labs that are in process. Patient reports that she has not missed sissy lithium doses. She said, \"I will  contact the Dr. Tani Gudino at [facility] in [place] as he is the one who prescribed the lithium. \"  Jarred March RN    "

## 2017-04-17 NOTE — TELEPHONE ENCOUNTER
Kesha LEFT MESSAGE @ 4:15pm 4/14/17:    She was hoping to get her lab results before the weekend, which of course did not happen.  Could you please review and advise.  Thank you..Ghazal Arcos

## 2017-04-17 NOTE — TELEPHONE ENCOUNTER
Some labs are not back yet.    Of the labs that are back the only abnormal lab was a low lithium level.   Has she missed many doses of lithium??    How many doses does she feel she missed over the past 2 weeks?

## 2017-04-18 LAB
GLIADIN IGA SER-ACNC: 1 U/ML
GLIADIN IGG SER-ACNC: NORMAL U/ML
TTG IGA SER-ACNC: 1 U/ML
TTG IGG SER-ACNC: 1 U/ML

## 2017-04-20 NOTE — TELEPHONE ENCOUNTER
Dr. Isabela PEREZ - Informed patient of recommendations. She followed up with Dr. Gudino. He said her levels are ok for now. He wants to keep her on the same dose as she is currently taking.   Jena Boyle RN

## 2017-05-02 ENCOUNTER — MYC MEDICAL ADVICE (OUTPATIENT)
Dept: FAMILY MEDICINE | Facility: CLINIC | Age: 37
End: 2017-05-02

## 2017-06-08 ENCOUNTER — ALLIED HEALTH/NURSE VISIT (OUTPATIENT)
Dept: FAMILY MEDICINE | Facility: CLINIC | Age: 37
End: 2017-06-08
Payer: COMMERCIAL

## 2017-06-08 PROCEDURE — 99207 ZZC NO CHARGE NURSE ONLY: CPT

## 2017-06-12 ENCOUNTER — VIRTUAL VISIT (OUTPATIENT)
Dept: FAMILY MEDICINE | Facility: OTHER | Age: 37
End: 2017-06-12

## 2017-06-13 NOTE — PROGRESS NOTES
"Date:   Clinician: Tani Valladares  Clinician NPI: [id number]  Patient: Kesha Carter  Patient : 1980  Patient Address: [address] [place] [address]  Patient Phone: [phone]  Visit Protocol: UTI  Patient Summary:  Kesha is a 36 year old ( : 1980 ) female who initiated a Visit for a presumed bladder infection. When asked the question \"Please sign me up to receive news, health information and promotions. \", Kesha responded \"No\".    Her symptoms began today and consist of urinary frequency, foul smelling urine, abdominal pain, urinary incontinence, hesitation, and urgency.   Symptom Details     Urinary Frequency: Several times each hour     Abdominal Pain: Mild, is improving and right-lower quadrant      She denies flank pain, hematuria, vaginal discharge, recent antibiotic use, nausea, loss of appetite, chills, fever, dysuria, and vomiting. Kesha has never had kidney stones. She has not been hospitalized, been a patient in a nursing home, or had a catheter in the past two weeks. She denies risk factors for sexually transmitted infections.   Kesha has not had any UTIs in the past 12 months. Her current symptoms are similar to the previous UTI symptoms. She took an antibiotic for her last infection but does not remember which one.   Kesha does not get yeast infections when she takes antibiotics.   She states she is not pregnant and denies breastfeeding. She has menstruated in the past month.   She does NOT smoke or use smokeless tobacco.   MEDICATIONS:  Antacid and thyroid   , ALLERGIES:   Z-pack/azithromycin/clarithromycin/erythromycin    Clinician Response:  Dear Kesha,  Based on the information you provided, I am unable to safely determine whether or not you have a urinary tract infection. Please be seen at your clinic or urgent care center. You will not be charged for this Visit.  I am providing you with a promo code for a free Visit. This " code will  in two weeks and may only be used once. Please redeem your free Visit by entering the following promo code on the payment screen: [id number]   Diagnosis: Unable to diagnose  Diagnosis ICD: R69  Additional Clinician Notes: I am sorry, without clarification severity of the abdominal pain (for this and the one just previous) and this risks involved.  I am unable to safely treat.  I advise being seen in a clinic, urgent care or a retail clinic (ie: [facility] or [facility]).

## 2017-06-15 ENCOUNTER — OFFICE VISIT (OUTPATIENT)
Dept: FAMILY MEDICINE | Facility: CLINIC | Age: 37
End: 2017-06-15
Payer: COMMERCIAL

## 2017-06-15 VITALS
BODY MASS INDEX: 24.17 KG/M2 | HEIGHT: 66 IN | DIASTOLIC BLOOD PRESSURE: 84 MMHG | SYSTOLIC BLOOD PRESSURE: 121 MMHG | WEIGHT: 150.4 LBS | TEMPERATURE: 99.1 F | HEART RATE: 57 BPM

## 2017-06-15 DIAGNOSIS — N76.0 VAGINITIS AND VULVOVAGINITIS: ICD-10-CM

## 2017-06-15 DIAGNOSIS — R39.89 URINARY PROBLEM: ICD-10-CM

## 2017-06-15 DIAGNOSIS — B37.9 CANDIDA INFECTION: Primary | ICD-10-CM

## 2017-06-15 LAB
BACTERIA #/AREA URNS HPF: ABNORMAL /HPF
MICRO REPORT STATUS: ABNORMAL
RBC #/AREA URNS AUTO: ABNORMAL /HPF (ref 0–2)
SPECIMEN SOURCE: ABNORMAL
WBC #/AREA URNS AUTO: ABNORMAL /HPF (ref 0–2)
WET PREP SPEC: ABNORMAL

## 2017-06-15 PROCEDURE — 99213 OFFICE O/P EST LOW 20 MIN: CPT | Performed by: FAMILY MEDICINE

## 2017-06-15 PROCEDURE — 81015 MICROSCOPIC EXAM OF URINE: CPT | Performed by: FAMILY MEDICINE

## 2017-06-15 PROCEDURE — 87210 SMEAR WET MOUNT SALINE/INK: CPT | Performed by: FAMILY MEDICINE

## 2017-06-15 RX ORDER — FLUCONAZOLE 150 MG/1
TABLET ORAL
Qty: 2 TABLET | Refills: 0 | Status: SHIPPED | OUTPATIENT
Start: 2017-06-15 | End: 2017-10-11

## 2017-06-15 NOTE — MR AVS SNAPSHOT
"              After Visit Summary   6/15/2017    Kesha Carter    MRN: [id number]           Patient Information     Date Of Birth          1980        Visit Information        Provider Department      6/15/2017 11:00 AM Pia Mar MD [facility]        Today's Diagnoses     Candida infection    -  1    Urinary problem        Vaginitis and vulvovaginitis           Follow-ups after your visit        Follow-up notes from your care team     Return if symptoms worsen or fail to improve.      Who to contact     Normal or non-critical lab and imaging results will be communicated to you by [employer], letter or phone within 4 business days after the clinic has received the results. If you do not hear from us within 7 days, please contact the clinic through [employer] or phone. If you have a critical or abnormal lab result, we will notify you by phone as soon as possible.  Submit refill requests through [employer] or call your pharmacy and they will forward the refill request to us. Please allow 3 business days for your refill to be completed.          If you need to speak with a  for additional information , please call: [phone]             Additional Information About Your Visit        MyChart Information     [employer] gives you secure access to your electronic health record. If you see a primary care provider, you can also send messages to your care team and make appointments. If you have questions, please call your primary care clinic.  If you do not have a primary care provider, please call [phone] and they will assist you.        Care EveryWhere ID     This is your Care EveryWhere ID. This could be used by other organizations to access your [place] medical records  [id number]        Your Vitals Were     Pulse Temperature Height BMI (Body Mass Index)          57 99.1  F (37.3  C) (Tympanic) 5' 6\" (1.676 m) 24.28 kg/m2         Blood Pressure from Last 3 Encounters: "   06/15/17 121/84   04/12/17 132/89   01/25/17 110/71    Weight from Last 3 Encounters:   06/15/17 150 lb 6.4 oz (68.2 kg)   04/12/17 142 lb (64.4 kg)   01/25/17 145 lb (65.8 kg)              We Performed the Following     Urine Microscopic     Wet prep          Today's Medication Changes          These changes are accurate as of: 6/15/17 11:59 PM.  If you have any questions, ask your nurse or doctor.               Start taking these medicines.        Dose/Directions    fluconazole 150 MG tablet   Commonly known as:  DIFLUCAN   Used for:  Candida infection   Started by:  Pia Mar MD        Take 1 now and 1 in 3 days   Quantity:  2 tablet   Refills:  0            Where to get your medicines      These medications were sent to [facility] [address] [facility]  [address] [facility] [address]     Phone:  [phone]     fluconazole 150 MG tablet                Primary Care Provider Office Phone # Fax #    Pia Mar [phone]       [address] [address]        Equal Access to Services     MICHAEL VARNER AH: Hadii aad ku hadasho Soomaali, waaxda luqadaha, qaybta kaalmada adeegyada, waxay idiin hayaan aniya walters ah. So [facility] [phone].    ATENCIÓN: Si habla español, tiene a crain disposición servicios gratuitos de asistencia lingüística. Llame al [phone].    We comply with applicable federal civil rights laws and [place] laws. We do not discriminate on the basis of race, color, national origin, age, disability sex, sexual orientation or gender identity.            Thank you!     Thank you for choosing [facility]  for your care. Our goal is always to provide you with excellent care. Hearing back from our patients is one way we can continue to improve our services. Please take a few minutes to complete the written survey that you may receive in the mail after your visit with us. Thank you!              Your Updated Medication List - Protect others around you: Learn how to safely use, store and throw away your medicines at www.disposemymeds.org.          This list is accurate as of: 6/15/17 11:59 PM.  Always use your most recent med list.                   Brand Name Dispense Instructions for use Diagnosis    ammonium lactate 12 % cream    LAC-HYDRIN    120 g    Apply topically 2 times daily    Keratosis pilaris       clonazePAM 0.5 MG tablet    klonoPIN    20 tablet    Take 0.5-1 tablets (0.25-0.5 mg) by mouth nightly as needed for other (insomnia)    Persistent insomnia       fluconazole 150 MG tablet    DIFLUCAN    2 tablet    Take 1 now and 1 in 3 days    Candida infection       levothyroxine 75 MCG tablet    SYNTHROID/LEVOTHROID    90 tablet    Take 1 tablet (75 mcg) by mouth daily    Hypothyroidism due to acquired atrophy of thyroid       lithium 600 MG capsule      Take 600 mg by mouth At Bedtime.        medroxyPROGESTERone 150 MG/ML injection    DEPO-PROVERA    3 mL    Inject 1 mL (150 mg) into the muscle every 3 months    Menstrual migraine, intractable, without status migrainosus, Acne vulgaris, Irritability       MIRALAX powder   Generic drug:  polyethylene glycol      Take 1 capful by mouth daily        Multi-vitamin Tabs tablet     100 tablet    Take 1 tablet by mouth daily    Abdominal pain, epigastric, Nausea, Bipolar disorder, in full remission, most recent episode manic (H)       OMEPRAZOLE PO      Take by mouth daily        ondansetron 4 MG ODT tab    ZOFRAN ODT    20 tablet    Take 1-2 tablets (4-8 mg) by mouth 3 times daily (before meals)    Nausea       PHAZYME MAXIMUM STRENGTH 250 MG Caps   Generic drug:  Simethicone      Take by mouth as needed        PROBIOTIC DAILY PO           spironolactone 50 MG tablet    ALDACTONE    90 tablet    Take 1 tablet (50 mg) by mouth daily    Acne vulgaris       ZOLMitriptan 5 MG tablet    ZOMIG    6 tablet    Take 1 tablet (5 mg) by mouth at onset of  headache for migraine May repeat in 2 hours. Max 2 tablets/24 hours.    Menstrual migraine, intractable, without status migrainosus

## 2017-06-15 NOTE — PROGRESS NOTES
SUBJECTIVE:                                                    Kesha Carter is a 36 year old female who presents to clinic today for the following health issues:    Virtual visit 17, given Nitrofurantn 100mg. Also took OTC Aso.   Frequency urination, feels bladder is in larged and always need to urinate.   Paulette Tate CMA      Problem list and histories reviewed & adjusted, as indicated.  Additional history: has been taking azo and also feel like she has a lot of pressure for the last 4 days   She has not had any d/c   The azo does not help  Took macrodantin and is no better  No back pain   Not much itching just pressure in her pelvic area       Patient Active Problem List   Diagnosis     FAMILY HISTORY OF ENDOCRINE DISEASE( other endo or metabol)     Irratability     CARDIOVASCULAR SCREENING; LDL GOAL LESS THAN 160     Adjustment disorder with anxiety     Bipolar disorder, current episode hypomanic (H)     Hypothyroidism     Past Surgical History:   Procedure Laterality Date     C REPAIR CRUCIATE LIGAMENT,KNEE      Left acl repair     C/SECTION, LOW TRANSVERSE      , Low Transverse     C/SECTION, LOW TRANSVERSE      , Low Transverse     LAPAROSCOPIC CHOLECYSTECTOMY N/A 2017    Procedure: LAPAROSCOPIC CHOLECYSTECTOMY;  Surgeon: Tani Brenner MD;  Location: [facility] OR     MAMMOPLASTY AUGMENTATION BILATERAL  2010    Implants.     TUBAL LIGATION      With C/S       Social History   Substance Use Topics     Smoking status: Former Smoker     Types: Cigarettes     Quit date: 2016     Smokeless tobacco: Never Used     Alcohol use Yes      Comment: Once a month      Family History   Problem Relation Age of Onset     Thyroid Disease Mother      Arthritis Mother      Hypertension Mother      Thyroid Disease Maternal Grandmother      Hypertension Maternal Grandmother      Cardiovascular Maternal Grandfather      HEART DISEASE Maternal Grandfather      Hypertension  "Maternal Grandfather      DIABETES Mother      Hypertension Paternal Grandmother      CEREBROVASCULAR DISEASE Paternal Grandfather      Anxiety Disorder Mother      Obesity Mother      Obesity Sister            ROS:  Constitutional, HEENT, cardiovascular, pulmonary, gi and gu systems are negative, except as otherwise noted.    OBJECTIVE:                                                    /84 (BP Location: Right arm, Patient Position: Chair, Cuff Size: Adult Regular)  Pulse 57  Temp 99.1  F (37.3  C) (Tympanic)  Ht 5' 6\" (1.676 m)  Wt 150 lb 6.4 oz (68.2 kg)  BMI 24.28 kg/m2 Body mass index is 24.28 kg/(m^2).   GENERAL APPEARANCE: healthy, alert and no distress  RESP: lungs clear to auscultation - no rales, rhonchi or wheezes  CV: regular rates and rhythm, normal S1 S2, no S3 or S4 and no murmur, click or rub  ABDOMEN: soft, nontender, without hepatosplenomegaly or masses, bowel sounds normal and no cva tenderness  SKIN: no suspicious lesions or rashes       ASSESSMENT/PLAN:                                                    1. Candida infection  Results for orders placed or performed in visit on 06/15/17   Urine Microscopic   Result Value Ref Range    WBC Urine O - 2 0 - 2 /HPF    RBC Urine O - 2 0 - 2 /HPF    Bacteria Urine Few (A) NEG /HPF   Wet prep   Result Value Ref Range    Specimen Description Vagina     Wet Prep (A)      Yeast seen Few  No clue cells seen  No Trichomonas seen      Micro Report Status FINAL 06/15/2017        - fluconazole (DIFLUCAN) 150 MG tablet; Take 1 now and 1 in 3 days  Dispense: 2 tablet; Refill: 0    2. Urinary problem    - Urine Microscopic  - Wet prep    3. Vaginitis and vulvovaginitis    - Wet prep    As above      reports that she quit smoking about 10 months ago. Her smoking use included Cigarettes. She has never used smokeless tobacco.          Pia Mar M.D.  [facility]    "

## 2017-07-12 ENCOUNTER — TELEPHONE (OUTPATIENT)
Dept: FAMILY MEDICINE | Facility: CLINIC | Age: 37
End: 2017-07-12

## 2017-07-12 NOTE — TELEPHONE ENCOUNTER
Dr. Mar,   Please see note below. Seen in office visit on 6-15-17. Please advise your recommendations, thank you.  ELIGIO Haile

## 2017-07-12 NOTE — TELEPHONE ENCOUNTER
Reason for call:  Patient reporting a symptom    Symptom or request: Kesha LEFT MESSAGE:  She states that she would like to get off of depo injections.  States that it is causing acne.  Would like to go to something different.  Does she need to be seen?  Just seen on 6/15/17.  Please call and assess. Thank you..Ghazal Arcos    Duration (how long have symptoms been present): did not specify in message.    Have you been treated for this before? Yes    Additional comments: none    Phone Number patient can be reached at:  Home number on file [phone] (home)    Best Time:  Did not specify in message    Can we leave a detailed message on this number:  did not specify in message    Call taken on 7/12/2017 at 1:21 PM by Ghazal Arcos

## 2017-07-12 NOTE — TELEPHONE ENCOUNTER
If they are making her acne worse she just should not get the next one. I don't think she needs contraception right now as she has had a tubal . She had her last injection 5 weeks ago so it may be a while for her to get her regular cycle back. Pia Mar M.D.'

## 2017-07-21 ENCOUNTER — MYC MEDICAL ADVICE (OUTPATIENT)
Dept: FAMILY MEDICINE | Facility: CLINIC | Age: 37
End: 2017-07-21

## 2017-07-21 DIAGNOSIS — L70.0 ACNE VULGARIS: Primary | ICD-10-CM

## 2017-07-21 RX ORDER — MINOCYCLINE HYDROCHLORIDE 100 MG/1
100 CAPSULE ORAL 2 TIMES DAILY
Qty: 180 CAPSULE | Refills: 3 | Status: SHIPPED | OUTPATIENT
Start: 2017-07-21 | End: 2019-07-19

## 2017-07-21 NOTE — TELEPHONE ENCOUNTER
Please see 7/12/17 telephone encounter for additional information. Did you want me to send her a option for e-visit, telephone visit? Thank you.  Jena Boyle RN

## 2017-08-22 ENCOUNTER — OFFICE VISIT (OUTPATIENT)
Dept: FAMILY MEDICINE | Facility: CLINIC | Age: 37
End: 2017-08-22
Payer: COMMERCIAL

## 2017-08-22 ENCOUNTER — TELEPHONE (OUTPATIENT)
Dept: FAMILY MEDICINE | Facility: CLINIC | Age: 37
End: 2017-08-22

## 2017-08-22 DIAGNOSIS — M54.40 MIDLINE LOW BACK PAIN WITH SCIATICA, SCIATICA LATERALITY UNSPECIFIED, UNSPECIFIED CHRONICITY: Primary | ICD-10-CM

## 2017-08-22 PROCEDURE — 99441 ZZC PHYSICIAN TELEPHONE EVALUATION 5-10 MIN: CPT | Performed by: FAMILY MEDICINE

## 2017-08-22 NOTE — PROGRESS NOTES
"LOW BACK PAIN nursing triage note:  Kesha Carter is a 36 year old female reports having low back pain. Pain began 8/18/17. Mechanism of injury:unknown. She thinks it is stress related---traveling back and forth to [place] because her Grandma is in the Hospital there.She said that she has been doing stretches and STIM. She has a massage appointment scheduled. She is hoping \"to get muscle relaxant ordered to help be sleep at night.\"    The pain is described as aching and spasms and it is located in the center low back. The pain Does not radiate     Does this patient have ANY of the following conditions?  Sudden onset or otherwise unexplained loss or changes in bowel or bladder control? No  No   Numbness in the groin / hip area (saddle anesthesia)? No   Suspected Fracture (due to mechanism of injury, history of osteoporosis, or long-term steroid use) or history of Cancer? No   Have you had your back pain for more than 6 weeks? No   Fever 38 C or 100.4 F for greater than 48 hours? No   Unrelenting night pain or no relief of pain at rest? Harder to sleep at night   Abdominal pain, urinary symptoms and/or nausea/vomiting? No   New onset of numbness or weakness of the leg not attributed to pain? No   Less than 18 years old or older than 65? No   Patient pregnant? No   Work or MVA-related injury was cause of back pain? No   Patient has history of gastric ulcers, chronic kidney disease, CHF, or coumadin use? No   Patient sees another specialist for LBP? Has an appointment for a massage      Kesha Carter is a 36 year old female who is being evaluated via a telephone visit sometime this afternoon. OK to LM if she does not answer.        The patient has been notified of following:      \"This telephone visit will be conducted via a call between you and your physician/provider. We have found that certain health care needs can be provided without the need for a physical exam.  This service lets us provide the care you " "need with a short phone conversation.  If a prescription is necessary we can send it directly to your pharmacy.  If lab work is needed we can place an order for that and you can then stop by our lab to have the test done at a later time.     We will bill your insurance company for this service.  Please check with your medical insurance if this type of visit is covered. You may be responsible for the cost of this type of visit if insurance coverage is denied.  The typical cost is $30 (10min), $59 (11-20min) and $85 (21-30min).  Most often these visits are shorter than 10 minutes.     If during the course of the call the physician/provider feels a telephone visit is not appropriate, you will not be charged for this service.\"         Consent has been obtained for this service by 2 care team members: yes. See the scanned image in the medical record.     Kesha Carter complains of  Back Pain        I have reviewed and updated the patient's Past Medical History, Social History, Family History and Medication List.     ALLERGIES  Erythromycin     Jarred March RN        Additional provider notes: has been stressed out has mid low back pain  She has been driving back and forth to [place].  She has a knot in the lower back. Has beent aking it easy and was resting over the weekend.  She has used muscle relaxer years ago. This helped her then  She is unable to sleep due to the pain aswhen she repositions she wakes up . This is not good for her mental health as she tends to trigger yoanna when she lacks sleep.        Assessment/Plan:  1. Midline low back pain with sciatica, sciatica laterality unspecified, unspecified chronicity  - tiZANidine (ZANAFLEX) 4 MG tablet; Take 0.5-1 tablets (2-4 mg) by mouth 3 times daily  Dispense: 30 tablet; Refill: 1       I have reviewed the note as documented above.  This accurately captures the substance of my conversation with the patient,  Pia Mar M.D.       Total time of call " between patient and provider was 7 minutes

## 2017-08-22 NOTE — TELEPHONE ENCOUNTER
Reason for call:  Patient reporting a symptom    Symptom or request: Kesha is reporting that she threw her back out over this past weekend.  States that it is stress induced due to her grandmother being in the hospital.  She states that she has been using icy-hot pads and it feels better, however it is very sore and uncomfortable at night and she is not sleeping.  She is requesting a muscle relaxer that she can take in the evenings.  Please call and assess. Thank you..Ghazal Arcos=    Duration (how long have symptoms been present): Since weekend of August 19, 2017.    Have you been treated for this before? No    Additional comments: none    Phone Number patient can be reached at:  Home number on file [phone] (home)    Best Time:  Any time    Can we leave a detailed message on this number:  YES    Call taken on 8/22/2017 at 12:15 PM by Ghazal Arcos

## 2017-08-22 NOTE — MR AVS SNAPSHOT
After Visit Summary   8/22/2017    Kesha Carter    MRN: [id number]           Patient Information     Date Of Birth          1980        Visit Information        Provider Department      8/22/2017 2:00 PM Pia Mar MD [facility]        Today's Diagnoses     Midline low back pain with sciatica, sciatica laterality unspecified, unspecified chronicity    -  1       Follow-ups after your visit        Who to contact     Normal or non-critical lab and imaging results will be communicated to you by [employer], letter or phone within 4 business days after the clinic has received the results. If you do not hear from us within 7 days, please contact the clinic through SETiTt or phone. If you have a critical or abnormal lab result, we will notify you by phone as soon as possible.  Submit refill requests through StrataCloud or call your pharmacy and they will forward the refill request to us. Please allow 3 business days for your refill to be completed.          If you need to speak with a  for additional information , please call: [phone]             Additional Information About Your Visit        [employer] Information     StrataCloud gives you secure access to your electronic health record. If you see a primary care provider, you can also send messages to your care team and make appointments. If you have questions, please call your primary care clinic.  If you do not have a primary care provider, please call [phone] and they will assist you.        Care EveryWhere ID     This is your Care EveryWhere ID. This could be used by other organizations to access your [place] medical records  [id number]         Blood Pressure from Last 3 Encounters:   06/15/17 121/84   04/12/17 132/89   01/25/17 110/71    Weight from Last 3 Encounters:   06/15/17 150 lb 6.4 oz (68.2 kg)   04/12/17 142 lb (64.4 kg)   01/25/17 145 lb (65.8 kg)              Today, you had the following     No  orders found for display         Today's Medication Changes          These changes are accurate as of: 8/22/17  5:37 PM.  If you have any questions, ask your nurse or doctor.               Start taking these medicines.        Dose/Directions    tiZANidine 4 MG tablet   Commonly known as:  ZANAFLEX   Used for:  Midline low back pain with sciatica, sciatica laterality unspecified, unspecified chronicity   Started by:  Pia Mar MD        Dose:  2-4 mg   Take 0.5-1 tablets (2-4 mg) by mouth 3 times daily   Quantity:  30 tablet   Refills:  1            Where to get your medicines      These medications were sent to [id number] - [place] - [address] [facility] [address], [place] [address]     Phone:  [phone]     tiZANidine 4 MG tablet                Primary Care Provider Office Phone # Fax #    Pia Mar [phone] [address] [facility] [address]        Equal Access to Services     [facility]: Hadii aad ku hadasho Soomaali, waaxda luqadaha, qaybta kaalmada adeegyada, waxay idiin hayaan aniya walters . So [facility] [phone].    ATENCIÓN: Si habla español, tiene a crain disposición servicios gratuitos de asistencia lingüística. [facility] [phone].    We comply with applicable federal civil rights laws and [place] laws. We do not discriminate on the basis of race, color, national origin, age, disability sex, sexual orientation or gender identity.            Thank you!     Thank you for choosing [facility]  for your care. Our goal is always to provide you with excellent care. Hearing back from our patients is one way we can continue to improve our services. Please take a few minutes to complete the written survey that you may receive in the mail after your visit with us. Thank you!             Your Updated Medication List - Protect others around you: Learn how to safely use,  store and throw away your medicines at www.disposemymeds.org.          This list is accurate as of: 8/22/17  5:37 PM.  Always use your most recent med list.                   Brand Name Dispense Instructions for use Diagnosis    ammonium lactate 12 % cream    LAC-HYDRIN    120 g    Apply topically 2 times daily    Keratosis pilaris       clonazePAM 0.5 MG tablet    klonoPIN    20 tablet    Take 0.5-1 tablets (0.25-0.5 mg) by mouth nightly as needed for other (insomnia)    Persistent insomnia       fluconazole 150 MG tablet    DIFLUCAN    2 tablet    Take 1 now and 1 in 3 days    Candida infection       levothyroxine 75 MCG tablet    SYNTHROID/LEVOTHROID    90 tablet    Take 1 tablet (75 mcg) by mouth daily    Hypothyroidism due to acquired atrophy of thyroid       lithium 600 MG capsule      Take 600 mg by mouth At Bedtime.        medroxyPROGESTERone 150 MG/ML injection    DEPO-PROVERA    3 mL    Inject 1 mL (150 mg) into the muscle every 3 months    Menstrual migraine, intractable, without status migrainosus, Acne vulgaris, Irritability       minocycline 100 MG capsule    MINOCIN/DYNACIN    180 capsule    Take 1 capsule (100 mg) by mouth 2 times daily    Acne vulgaris       MIRALAX powder   Generic drug:  polyethylene glycol      Take 1 capful by mouth daily        Multi-vitamin Tabs tablet     100 tablet    Take 1 tablet by mouth daily    Abdominal pain, epigastric, Nausea, Bipolar disorder, in full remission, most recent episode manic (H)       OMEPRAZOLE PO      Take by mouth daily        ondansetron 4 MG ODT tab    ZOFRAN ODT    20 tablet    Take 1-2 tablets (4-8 mg) by mouth 3 times daily (before meals)    Nausea       PHAZYME MAXIMUM STRENGTH 250 MG Caps   Generic drug:  Simethicone      Take by mouth as needed        PROBIOTIC DAILY PO           spironolactone 50 MG tablet    ALDACTONE    90 tablet    Take 1 tablet (50 mg) by mouth daily    Acne vulgaris       tiZANidine 4 MG tablet    ZANAFLEX    30 tablet     Take 0.5-1 tablets (2-4 mg) by mouth 3 times daily    Midline low back pain with sciatica, sciatica laterality unspecified, unspecified chronicity       ZOLMitriptan 5 MG tablet    ZOMIG    6 tablet    Take 1 tablet (5 mg) by mouth at onset of headache for migraine May repeat in 2 hours. Max 2 tablets/24 hours.    Menstrual migraine, intractable, without status migrainosus

## 2017-08-22 NOTE — TELEPHONE ENCOUNTER
"LOW BACK PAIN nursing triage note:  Kesha Carter is a 36 year old female reports having low back pain. Pain began 8/18/17. Mechanism of injury:unknown. She thinks it is stress related---traveling back and forth to [place] because her Grandma is in the Hospital there.She said that she has been doing stretches and STIM. She has a massage appointment scheduled. She is hoping \"to get muscle relaxant ordered to help be sleep at night.\"    The pain is described as aching and spasms and it is located in the center low back. The pain Does not radiate    Does this patient have ANY of the following conditions?  Sudden onset or otherwise unexplained loss or changes in bowel or bladder control? No  No   Numbness in the groin / hip area (saddle anesthesia)? No   Suspected Fracture (due to mechanism of injury, history of osteoporosis, or long-term steroid use) or history of Cancer? No     Have you had your back pain for more than 6 weeks? No   Fever 38 C or 100.4 F for greater than 48 hours? No   Unrelenting night pain or no relief of pain at rest? Harder to sleep at night   Abdominal pain, urinary symptoms and/or nausea/vomiting? No   New onset of numbness or weakness of the leg not attributed to pain? No   Less than 18 years old or older than 65? No   Patient pregnant? No   Work or MVA-related injury was cause of back pain? No   Patient has history of gastric ulcers, chronic kidney disease, CHF, or coumadin use? No   Patient sees another specialist for LBP? Has an appointment for a massage     Kesha Carter is a 36 year old female who is being evaluated via a telephone visit sometime this afternoon. OK to LM if she does not answer.       The patient has been notified of following:     \"This telephone visit will be conducted via a call between you and your physician/provider. We have found that certain health care needs can be provided without the need for a physical exam.  This service lets us provide the care you " "need with a short phone conversation.  If a prescription is necessary we can send it directly to your pharmacy.  If lab work is needed we can place an order for that and you can then stop by our lab to have the test done at a later time.    We will bill your insurance company for this service.  Please check with your medical insurance if this type of visit is covered. You may be responsible for the cost of this type of visit if insurance coverage is denied.  The typical cost is $30 (10min), $59 (11-20min) and $85 (21-30min).  Most often these visits are shorter than 10 minutes.    If during the course of the call the physician/provider feels a telephone visit is not appropriate, you will not be charged for this service.\"       Consent has been obtained for this service by 2 care team members: yes. See the scanned image in the medical record.    Kesha Carter complains of  Back Pain      I have reviewed and updated the patient's Past Medical History, Social History, Family History and Medication List.    ALLERGIES  Erythromycin    Jarred March RN      Additional provider notes:     Assessment/Plan:  No diagnosis found.    I have reviewed the note as documented above.  This accurately captures the substance of my conversation with the patient,      Total time of call between patient and provider was  minutes       "

## 2017-08-27 DIAGNOSIS — L70.0 ACNE VULGARIS: ICD-10-CM

## 2017-08-28 DIAGNOSIS — E03.4 HYPOTHYROIDISM DUE TO ACQUIRED ATROPHY OF THYROID: ICD-10-CM

## 2017-08-28 RX ORDER — SPIRONOLACTONE 50 MG/1
TABLET, FILM COATED ORAL
Qty: 90 TABLET | Refills: 3 | Status: SHIPPED | OUTPATIENT
Start: 2017-08-28 | End: 2018-10-11

## 2017-08-28 RX ORDER — LEVOTHYROXINE SODIUM 75 UG/1
TABLET ORAL
Qty: 90 TABLET | Refills: 2 | Status: SHIPPED | OUTPATIENT
Start: 2017-08-28 | End: 2017-10-11 | Stop reason: DRUGHIGH

## 2017-08-28 NOTE — TELEPHONE ENCOUNTER
LEVOTHYROXINE 0.075MG (75MCG) TABS       Last Written Prescription Date: 8/26/16  Last Quantity: 90, # refills: 3  Last Office Visit with G, P or [facility] prescribing provider: 8/22/17        TSH   Date Value Ref Range Status   04/12/2017 2.63 0.40 - 4.00 mU/L Final

## 2017-08-28 NOTE — TELEPHONE ENCOUNTER
SPIRONOLACTONE 50MG TABLETS        Last Written Prescription Date: 8/26/16  Last Fill Quantity: 90,  # refills: 3   Last Office Visit with [facility], [facility] or [facility] prescribing provider: 8/22/17

## 2017-10-11 ENCOUNTER — OFFICE VISIT (OUTPATIENT)
Dept: FAMILY MEDICINE | Facility: CLINIC | Age: 37
End: 2017-10-11
Payer: COMMERCIAL

## 2017-10-11 VITALS
BODY MASS INDEX: 25.55 KG/M2 | HEART RATE: 66 BPM | SYSTOLIC BLOOD PRESSURE: 122 MMHG | TEMPERATURE: 98.8 F | HEIGHT: 66 IN | DIASTOLIC BLOOD PRESSURE: 78 MMHG | WEIGHT: 159 LBS

## 2017-10-11 DIAGNOSIS — R10.84 ABDOMINAL PAIN, GENERALIZED: ICD-10-CM

## 2017-10-11 DIAGNOSIS — F31.0 BIPOLAR DISORDER, CURRENT EPISODE HYPOMANIC (H): Primary | ICD-10-CM

## 2017-10-11 DIAGNOSIS — G47.00 PERSISTENT DISORDER OF INITIATING OR MAINTAINING SLEEP: ICD-10-CM

## 2017-10-11 DIAGNOSIS — K58.2 IRRITABLE BOWEL SYNDROME WITH BOTH CONSTIPATION AND DIARRHEA: ICD-10-CM

## 2017-10-11 DIAGNOSIS — L70.0 ACNE VULGARIS: ICD-10-CM

## 2017-10-11 DIAGNOSIS — E03.9 ACQUIRED HYPOTHYROIDISM: ICD-10-CM

## 2017-10-11 DIAGNOSIS — R45.4 IRRITABILITY: ICD-10-CM

## 2017-10-11 DIAGNOSIS — Z13.6 CARDIOVASCULAR SCREENING; LDL GOAL LESS THAN 160: ICD-10-CM

## 2017-10-11 DIAGNOSIS — G43.839 MENSTRUAL MIGRAINE, INTRACTABLE, WITHOUT STATUS MIGRAINOSUS: ICD-10-CM

## 2017-10-11 DIAGNOSIS — R63.5 ABNORMAL WEIGHT GAIN: ICD-10-CM

## 2017-10-11 LAB
ALBUMIN SERPL-MCNC: 3.8 G/DL (ref 3.4–5)
ALP SERPL-CCNC: 61 U/L (ref 40–150)
ALT SERPL W P-5'-P-CCNC: 23 U/L (ref 0–50)
AMYLASE SERPL-CCNC: 49 U/L (ref 30–110)
ANION GAP SERPL CALCULATED.3IONS-SCNC: 6 MMOL/L (ref 3–14)
AST SERPL W P-5'-P-CCNC: 23 U/L (ref 0–45)
BILIRUB SERPL-MCNC: 0.5 MG/DL (ref 0.2–1.3)
BUN SERPL-MCNC: 12 MG/DL (ref 7–30)
CALCIUM SERPL-MCNC: 9.4 MG/DL (ref 8.5–10.1)
CHLORIDE SERPL-SCNC: 105 MMOL/L (ref 94–109)
CHOLEST SERPL-MCNC: 182 MG/DL
CO2 SERPL-SCNC: 26 MMOL/L (ref 20–32)
CORTIS SERPL-MCNC: 19.2 UG/DL (ref 4–22)
CREAT SERPL-MCNC: 0.87 MG/DL (ref 0.52–1.04)
GFR SERPL CREATININE-BSD FRML MDRD: 72 ML/MIN/1.7M2
GLUCOSE SERPL-MCNC: 84 MG/DL (ref 70–99)
HDLC SERPL-MCNC: 108 MG/DL
LDLC SERPL CALC-MCNC: 63 MG/DL
LIPASE SERPL-CCNC: 197 U/L (ref 73–393)
LITHIUM SERPL-SCNC: 0.78 MMOL/L (ref 0.6–1.2)
NONHDLC SERPL-MCNC: 74 MG/DL
POTASSIUM SERPL-SCNC: 4.8 MMOL/L (ref 3.4–5.3)
PROT SERPL-MCNC: 7.9 G/DL (ref 6.8–8.8)
SODIUM SERPL-SCNC: 137 MMOL/L (ref 133–144)
T4 FREE SERPL-MCNC: 0.97 NG/DL (ref 0.76–1.46)
TRIGL SERPL-MCNC: 54 MG/DL
TSH SERPL DL<=0.005 MIU/L-ACNC: 4.43 MU/L (ref 0.4–4)

## 2017-10-11 PROCEDURE — 80061 LIPID PANEL: CPT | Performed by: FAMILY MEDICINE

## 2017-10-11 PROCEDURE — 36415 COLL VENOUS BLD VENIPUNCTURE: CPT | Performed by: FAMILY MEDICINE

## 2017-10-11 PROCEDURE — 84439 ASSAY OF FREE THYROXINE: CPT | Performed by: FAMILY MEDICINE

## 2017-10-11 PROCEDURE — 80053 COMPREHEN METABOLIC PANEL: CPT | Performed by: FAMILY MEDICINE

## 2017-10-11 PROCEDURE — 80178 ASSAY OF LITHIUM: CPT | Performed by: FAMILY MEDICINE

## 2017-10-11 PROCEDURE — 82150 ASSAY OF AMYLASE: CPT | Performed by: FAMILY MEDICINE

## 2017-10-11 PROCEDURE — 84443 ASSAY THYROID STIM HORMONE: CPT | Performed by: FAMILY MEDICINE

## 2017-10-11 PROCEDURE — 82533 TOTAL CORTISOL: CPT | Performed by: FAMILY MEDICINE

## 2017-10-11 PROCEDURE — 99214 OFFICE O/P EST MOD 30 MIN: CPT | Performed by: FAMILY MEDICINE

## 2017-10-11 PROCEDURE — 83690 ASSAY OF LIPASE: CPT | Performed by: FAMILY MEDICINE

## 2017-10-11 RX ORDER — NORGESTIMATE AND ETHINYL ESTRADIOL 0.25-0.035
KIT ORAL
Qty: 112 TABLET | Refills: 4 | Status: SHIPPED | OUTPATIENT
Start: 2017-10-11 | End: 2018-10-11

## 2017-10-11 RX ORDER — LEVOTHYROXINE SODIUM 100 UG/1
100 TABLET ORAL DAILY
Qty: 90 TABLET | Refills: 3 | Status: SHIPPED | OUTPATIENT
Start: 2017-10-11 | End: 2018-10-11

## 2017-10-11 RX ORDER — CHOLESTYRAMINE 4 G/9G
2 POWDER, FOR SUSPENSION ORAL
Qty: 540 G | Refills: 3 | Status: SHIPPED | OUTPATIENT
Start: 2017-10-11 | End: 2022-10-11

## 2017-10-11 RX ORDER — LITHIUM CARBONATE 600 MG/1
CAPSULE ORAL
COMMUNITY
Start: 2017-10-11

## 2017-10-11 RX ORDER — TEMAZEPAM 15 MG/1
CAPSULE ORAL
Qty: 30 CAPSULE | COMMUNITY
Start: 2017-10-11 | End: 2023-10-16

## 2017-10-11 NOTE — MR AVS SNAPSHOT
After Visit Summary   10/11/2017    Kesha Carter    MRN: [id number]           Patient Information     Date Of Birth          1980        Visit Information        Provider Department      10/11/2017 7:30 AM Pia Mar MD [facility]'s Diagnoses     Bipolar disorder, current episode hypomanic (H)    -  1    Acne vulgaris        Irritability        Menstrual migraine, intractable, without status migrainosus        Persistent disorder of initiating or maintaining sleep        Irritable bowel syndrome with both constipation and diarrhea        Acquired hypothyroidism        CARDIOVASCULAR SCREENING; LDL GOAL LESS THAN 160        Abdominal pain, generalized        Abnormal weight gain          Care Instructions    Take from 1 teaspoon to 3 scoops daily of the questran as needed for the loose stools           Follow-ups after your visit        Who to contact     Normal or non-critical lab and imaging results will be communicated to you by Biletut, letter or phone within 4 business days after the clinic has received the results. If you do not hear from us within 7 days, please contact the clinic through Biletut or phone. If you have a critical or abnormal lab result, we will notify you by phone as soon as possible.  Submit refill requests through hiyalife or call your pharmacy and they will forward the refill request to us. Please allow 3 business days for your refill to be completed.          If you need to speak with a  for additional information , please call: [phone]             Additional Information About Your Visit        hiyalife Information     hiyalife gives you secure access to your electronic health record. If you see a primary care provider, you can also send messages to your care team and make appointments. If you have questions, please call your primary care clinic.  If you do not have a primary care provider, please call [phone]  "and they will assist you.        Care EveryWhere ID     This is your Care EveryWhere ID. This could be used by other organizations to access your [place] medical records  [id number]        Your Vitals Were     Pulse Temperature Height BMI (Body Mass Index)          66 98.8  F (37.1  C) (Tympanic) 5' 6\" (1.676 m) 25.66 kg/m2         Blood Pressure from Last 3 Encounters:   10/11/17 122/78   06/15/17 121/84   04/12/17 132/89    Weight from Last 3 Encounters:   10/11/17 159 lb (72.1 kg)   06/15/17 150 lb 6.4 oz (68.2 kg)   04/12/17 142 lb (64.4 kg)              We Performed the Following     Amylase     Comprehensive metabolic panel     Cortisol     Lipase     Lipid panel reflex to direct LDL     Lithium level     T4, free     TSH          Today's Medication Changes          These changes are accurate as of: 10/11/17  8:35 AM.  If you have any questions, ask your nurse or doctor.               Start taking these medicines.        Dose/Directions    cholestyramine 4 GM/DOSE powder   Commonly known as:  QUESTRAN   Used for:  Irritable bowel syndrome with both constipation and diarrhea   Started by:  Pia Mar MD        Dose:  2 g   Take 2 g by mouth 3 times daily (with meals) Take as directed to control GI symptoms   Quantity:  540 g   Refills:  3       norgestimate-ethinyl estradiol 0.25-35 MG-MCG per tablet   Commonly known as:  ORTHO-CYCLEN, SPRINTEC   Used for:  Acne vulgaris, Irritability, Menstrual migraine, intractable, without status migrainosus   Started by:  Pia Mar MD        Take 1 active pill daily for 12 weeks skip placebo pills   Quantity:  112 tablet   Refills:  4         These medicines have changed or have updated prescriptions.        Dose/Directions    lithium 600 MG capsule   This may have changed:    - how much to take  - how to take this  - when to take this  - additional instructions   Changed by:  Pia Mar MD        Take 600 one day 900 mg the next day on an " alternating schedule   Refills:  0            Where to get your medicines      These medications were sent to [id number] - [place] [address] [facility] [place] [address] [address], [place] [address]     Phone:  [phone]     cholestyramine 4 GM/DOSE powder    norgestimate-ethinyl estradiol 0.25-35 MG-MCG per tablet                Primary Care Provider Office Phone # Fax #    Pia Mar [phone] [address] [facility] [address]        Equal Access to Services     [facility]: Hadii aad ku hadasho Soomaali, waaxda luqadaha, qaybta kaalmada adeegyada, waxjosh pereirain hayaan adereshma walters . So [facility] [phone].    ATENCIÓN: Si habla español, tiene a crain disposición servicios gratuitos de asistencia lingüística. [facility] [phone].    We comply with applicable federal civil rights laws and [place] laws. We do not discriminate on the basis of race, color, national origin, age, disability, sex, sexual orientation, or gender identity.            Thank you!     Thank you for choosing [facility]  for your care. Our goal is always to provide you with excellent care. Hearing back from our patients is one way we can continue to improve our services. Please take a few minutes to complete the written survey that you may receive in the mail after your visit with us. Thank you!             Your Updated Medication List - Protect others around you: Learn how to safely use, store and throw away your medicines at www.disposemymeds.org.          This list is accurate as of: 10/11/17  8:35 AM.  Always use your most recent med list.                   Brand Name Dispense Instructions for use Diagnosis    ammonium lactate 12 % cream    LAC-HYDRIN    120 g    Apply topically 2 times daily    Keratosis pilaris       cholestyramine 4 GM/DOSE powder    QUESTRAN    540 g    Take 2 g by mouth 3 times daily (with meals)  Take as directed to control GI symptoms    Irritable bowel syndrome with both constipation and diarrhea       clonazePAM 0.5 MG tablet    klonoPIN    20 tablet    Take 0.5-1 tablets (0.25-0.5 mg) by mouth nightly as needed for other (insomnia)    Persistent insomnia       levothyroxine 75 MCG tablet    SYNTHROID/LEVOTHROID    90 tablet    TAKE 1 TABLET BY MOUTH DAILY    Hypothyroidism due to acquired atrophy of thyroid       lithium 600 MG capsule      Take 600 one day 900 mg the next day on an alternating schedule        minocycline 100 MG capsule    MINOCIN/DYNACIN    180 capsule    Take 1 capsule (100 mg) by mouth 2 times daily    Acne vulgaris       MIRALAX powder   Generic drug:  polyethylene glycol      Take 1 capful by mouth daily        Multi-vitamin Tabs tablet     100 tablet    Take 1 tablet by mouth daily    Abdominal pain, epigastric, Nausea, Bipolar disorder, in full remission, most recent episode manic (H)       norgestimate-ethinyl estradiol 0.25-35 MG-MCG per tablet    ORTHO-CYCLEN, SPRINTEC    112 tablet    Take 1 active pill daily for 12 weeks skip placebo pills    Acne vulgaris, Irritability, Menstrual migraine, intractable, without status migrainosus       OMEPRAZOLE PO      Take by mouth daily        ondansetron 4 MG ODT tab    ZOFRAN ODT    20 tablet    Take 1-2 tablets (4-8 mg) by mouth 3 times daily (before meals)    Nausea       PHAZYME MAXIMUM STRENGTH 250 MG Caps   Generic drug:  Simethicone      Take by mouth as needed        PROBIOTIC DAILY PO           spironolactone 50 MG tablet    ALDACTONE    90 tablet    TAKE 1 TABLET BY MOUTH DAILY    Acne vulgaris       temazepam 15 MG capsule    RESTORIL    30 capsule     Persistent disorder of initiating or maintaining sleep       tiZANidine 4 MG tablet    ZANAFLEX    30 tablet    Take 0.5-1 tablets (2-4 mg) by mouth 3 times daily    Midline low back pain with sciatica, sciatica laterality unspecified, unspecified chronicity       ZOLMitriptan 5  MG tablet    ZOMIG    6 tablet    Take 1 tablet (5 mg) by mouth at onset of headache for migraine May repeat in 2 hours. Max 2 tablets/24 hours.    Menstrual migraine, intractable, without status migrainosus

## 2017-10-11 NOTE — PROGRESS NOTES
"SUBJECTIVE:                                                    Kesha Carter is a 36 year old female who presents to clinic today for the following health issues:    Patient appointment note:  Weight gain since gallbladder removal in addition to   medical changes ordered by another doctor. Need to   check my levels including thyroid. Flu shot and also   birth control renewal.      {Superlists:510167}    Problem list and histories reviewed & adjusted, as indicated.  Additional history: ***    {HIST REVIEW/ LINKS 2:768856}    ROS:  {ROS COMP:561993}    OBJECTIVE:                                                    /78 (BP Location: Right arm)  Pulse 66  Temp 98.8  F (37.1  C) (Tympanic)  Ht 5' 6\" (1.676 m)  Wt 159 lb (72.1 kg)  BMI 25.66 kg/m2 Body mass index is 25.66 kg/(m^2).   {EXAM CHOICES:139030}       ASSESSMENT/PLAN:                                                      No diagnosis found.   reports that she quit smoking about 14 months ago. Her smoking use included Cigarettes. She has never used smokeless tobacco.  {Tobacco Cessation needed for ACO -- Delete if patient is a non-smoker:442024}    {Weight Management Plan needed for ACO:162870}    Pia Mar M.D.  [facility]    "

## 2017-10-11 NOTE — PROGRESS NOTES
SUBJECTIVE:                                                    Kesha Carter is a 36 year old female who presents to clinic today for the following health issues:    Weight  Having trouble after the gb removal  She has been on the fodmap diet   She has been sensitive to food   She will have the stomachache after eating   Still has all the gastro things thirsty allt he time   Drinking a gallon a day   Wants to eat starches all day   Saw psychiatry and she had an adjustment in her lithium as her level was too low   Feels foggy/fuzzy   Needs refill on her birth control as this controls her headache and helps with her moods     Wt Readings from Last 10 Encounters:   10/11/17 159 lb (72.1 kg)   06/15/17 150 lb 6.4 oz (68.2 kg)   17 142 lb (64.4 kg)   17 145 lb (65.8 kg)   17 147 lb (66.7 kg)   17 154 lb (69.9 kg)   16 154 lb (69.9 kg)   16 156 lb (70.8 kg)   07/07/15 155 lb (70.3 kg)   12/15/14 157 lb (71.2 kg)         Problem list and histories reviewed & adjusted, as indicated.  Additional history: has been running and exercising craving certain food s  Has removed gluten and dairy   She tested positive for the lactose intolerance  She has been sticking wit the cucumber an tomato diet   Her situation isnunusual she has been on her lithium and her mood is well controlled on this. Her stomeach is off but she does fel better overall. She had such nausea and abdominal pain         Patient Active Problem List   Diagnosis     FAMILY HISTORY OF ENDOCRINE DISEASE( other endo or metabol)     Irratability     CARDIOVASCULAR SCREENING; LDL GOAL LESS THAN 160     Adjustment disorder with anxiety     Bipolar disorder, current episode hypomanic (H)     Hypothyroidism     Past Surgical History:   Procedure Laterality Date     C REPAIR CRUCIATE LIGAMENT,KNEE      Left acl repair     C/SECTION, LOW TRANSVERSE      , Low Transverse     C/SECTION, LOW TRANSVERSE       ", Low Transverse     LAPAROSCOPIC CHOLECYSTECTOMY N/A 2017    Procedure: LAPAROSCOPIC CHOLECYSTECTOMY;  Surgeon: Tani Brenner MD;  Location: [facility]     MAMMOPLASTY AUGMENTATION BILATERAL  2010    Implants.     TUBAL LIGATION      With C/S       Social History   Substance Use Topics     Smoking status: Former Smoker     Types: Cigarettes     Quit date: 2016     Smokeless tobacco: Never Used     Alcohol use Yes      Comment: Once a month      Family History   Problem Relation Age of Onset     Thyroid Disease Mother      Arthritis Mother      Hypertension Mother      Thyroid Disease Maternal Grandmother      Hypertension Maternal Grandmother      Cardiovascular Maternal Grandfather      HEART DISEASE Maternal Grandfather      Hypertension Maternal Grandfather      DIABETES Mother      Hypertension Paternal Grandmother      CEREBROVASCULAR DISEASE Paternal Grandfather      Anxiety Disorder Mother      Obesity Mother      Obesity Sister              ROS:  Constitutional, HEENT, cardiovascular, pulmonary, gi and gu systems are negative, except as otherwise noted.      OBJECTIVE:                                                    Temp 98.8  F (37.1  C) (Tympanic)  Ht 5' 6\" (1.676 m)  Wt 159 lb (72.1 kg)  BMI 25.66 kg/m2 Body mass index is 25.66 kg/(m^2).   GENERAL APPEARANCE: healthy, alert and no distress  HENT: ear canals and TM's normal and nose and mouth without ulcers or lesions  NECK: no adenopathy, no asymmetry, masses, or scars and thyroid normal to palpation  RESP: lungs clear to auscultation - no rales, rhonchi or wheezes  CV: regular rates and rhythm, normal S1 S2, no S3 or S4 and no murmur, click or rub  ABDOMEN: soft, nontender, without hepatosplenomegaly or masses and bowel sounds normal  MS: extremities normal- no gross deformities noted       ASSESSMENT/PLAN:                                                    1. Bipolar disorder, current episode hypomanic (H)    - Comprehensive " metabolic panel  - Lithium level    2. Acne vulgaris  Cont on ocp  - norgestimate-ethinyl estradiol (ORTHO-CYCLEN, SPRINTEC) 0.25-35 MG-MCG per tablet; Take 1 active pill daily for 12 weeks skip placebo pills  Dispense: 112 tablet; Refill: 4  - Comprehensive metabolic panel    3. Irritability    - norgestimate-ethinyl estradiol (ORTHO-CYCLEN, SPRINTEC) 0.25-35 MG-MCG per tablet; Take 1 active pill daily for 12 weeks skip placebo pills  Dispense: 112 tablet; Refill: 4    4. Menstrual migraine, intractable, without status migrainosus    - norgestimate-ethinyl estradiol (ORTHO-CYCLEN, SPRINTEC) 0.25-35 MG-MCG per tablet; Take 1 active pill daily for 12 weeks skip placebo pills  Dispense: 112 tablet; Refill: 4  - Comprehensive metabolic panel    5. Persistent disorder of initiating or maintaining sleep  She is not sleeping well will try restoril may take   - temazepam (RESTORIL) 15 MG capsule;   Dispense: 30 capsule  - Comprehensive metabolic panel    6. Irritable bowel syndrome with both constipation and diarrhea  Titrate the questran to help keep from having the loose stools   - cholestyramine (QUESTRAN) 4 GM/DOSE powder; Take 2 g by mouth 3 times daily (with meals) Take as directed to control GI symptoms  Dispense: 540 g; Refill: 3  - Lipase  - Amylase  - Cortisol    7. Acquired hypothyroidism    - TSH  - T4, free  - levothyroxine (SYNTHROID/LEVOTHROID) 100 MCG tablet; Take 1 tablet (100 mcg) by mouth daily  Dispense: 90 tablet; Refill: 3    8. CARDIOVASCULAR SCREENING; LDL GOAL LESS THAN 160    - Lipid panel reflex to direct LDL    9. Abdominal pain, generalized    - Cortisol  - Lithium level    10. Abnormal weight gain  Will check labs but most likely due to side effects of medications and metabolism. Cont medications   - TSH  - T4, free  - Lipase  - Amylase  - Cortisol     reports that she quit smoking about 14 months ago. Her smoking use included Cigarettes. She has never used smokeless  tobacco.              Pia Mar M.D.    [facility]

## 2017-11-14 ENCOUNTER — TRANSFERRED RECORDS (OUTPATIENT)
Dept: HEALTH INFORMATION MANAGEMENT | Facility: CLINIC | Age: 37
End: 2017-11-14

## 2017-11-20 ENCOUNTER — TRANSFERRED RECORDS (OUTPATIENT)
Dept: HEALTH INFORMATION MANAGEMENT | Facility: CLINIC | Age: 37
End: 2017-11-20

## 2017-12-12 ENCOUNTER — TRANSFERRED RECORDS (OUTPATIENT)
Dept: HEALTH INFORMATION MANAGEMENT | Facility: CLINIC | Age: 37
End: 2017-12-12

## 2017-12-12 ENCOUNTER — E-VISIT (OUTPATIENT)
Dept: FAMILY MEDICINE | Facility: CLINIC | Age: 37
End: 2017-12-12
Payer: COMMERCIAL

## 2017-12-12 ENCOUNTER — MYC MEDICAL ADVICE (OUTPATIENT)
Dept: FAMILY MEDICINE | Facility: CLINIC | Age: 37
End: 2017-12-12

## 2017-12-12 DIAGNOSIS — E03.4 HYPOTHYROIDISM DUE TO ACQUIRED ATROPHY OF THYROID: Primary | ICD-10-CM

## 2017-12-12 DIAGNOSIS — R63.5 ABNORMAL WEIGHT GAIN: ICD-10-CM

## 2017-12-12 PROCEDURE — 99444 ZZC PHYSICIAN ONLINE EVALUATION & MANAGEMENT SERVICE: CPT | Performed by: FAMILY MEDICINE

## 2017-12-19 ENCOUNTER — TELEPHONE (OUTPATIENT)
Dept: ENDOCRINOLOGY | Facility: CLINIC | Age: 37
End: 2017-12-19

## 2017-12-19 NOTE — TELEPHONE ENCOUNTER
To schedulers: Please schedule her for lst available endocrine    Laura Adamson MD  Endocrine triage

## 2017-12-19 NOTE — TELEPHONE ENCOUNTER
----- Message from Leandra Vargas sent at 12/19/2017 12:18 PM CST -----  Regarding: Endocrine referral  Contact: [phone]  Endocrine referral received on the chart from Dr. Mar, for hypothyroidism.     Thank you!  Jada    Call Center       Please DO NOT send this message and/or reply back to sender. Call Center Representatives DO NOT respond to messages.

## 2017-12-20 DIAGNOSIS — E03.4 HYPOTHYROIDISM DUE TO ACQUIRED ATROPHY OF THYROID: ICD-10-CM

## 2017-12-20 DIAGNOSIS — R63.5 ABNORMAL WEIGHT GAIN: ICD-10-CM

## 2017-12-20 PROCEDURE — 84443 ASSAY THYROID STIM HORMONE: CPT | Performed by: FAMILY MEDICINE

## 2017-12-20 PROCEDURE — 36415 COLL VENOUS BLD VENIPUNCTURE: CPT | Performed by: FAMILY MEDICINE

## 2017-12-21 LAB — TSH SERPL DL<=0.005 MIU/L-ACNC: 2.82 MU/L (ref 0.4–4)

## 2017-12-21 NOTE — PROGRESS NOTES
Kesha,  Your lab results were normal/stable. Please feel free to my chart or call the office with questions. Pia Mra M.D.

## 2018-01-08 ASSESSMENT — ENCOUNTER SYMPTOMS
HALLUCINATIONS: 0
HEADACHES: 1
WEIGHT GAIN: 1
SYNCOPE: 0
WEAKNESS: 0
NECK PAIN: 0
EYE IRRITATION: 1
EYE PAIN: 0
ALTERED TEMPERATURE REGULATION: 1
EYE WATERING: 1
RECTAL PAIN: 1
BACK PAIN: 1
HEARTBURN: 1
SLEEP DISTURBANCES DUE TO BREATHING: 0
LIGHT-HEADEDNESS: 1
DIARRHEA: 1
PALPITATIONS: 1
WEIGHT LOSS: 0
INCREASED ENERGY: 1
POLYPHAGIA: 1
MUSCLE WEAKNESS: 0
SPEECH CHANGE: 0
CHILLS: 1
STIFFNESS: 0
POOR WOUND HEALING: 0
SEIZURES: 0
HYPOTENSION: 0
SKIN CHANGES: 0
NUMBNESS: 0
TREMORS: 0
NAUSEA: 1
HYPERTENSION: 0
ABDOMINAL PAIN: 1
ORTHOPNEA: 0
JOINT SWELLING: 0
NIGHT SWEATS: 1
TINGLING: 0
CONSTIPATION: 1
LOSS OF CONSCIOUSNESS: 0
VOMITING: 1
BOWEL INCONTINENCE: 0
PARALYSIS: 0
LEG PAIN: 0
DECREASED APPETITE: 1
DOUBLE VISION: 1
JAUNDICE: 0
MUSCLE CRAMPS: 1
EXERCISE INTOLERANCE: 0
BLOATING: 1
NAIL CHANGES: 0
BLOOD IN STOOL: 1
POLYDIPSIA: 1
MEMORY LOSS: 0
DIZZINESS: 1
EYE REDNESS: 0
FATIGUE: 1
MYALGIAS: 1
ARTHRALGIAS: 0
DISTURBANCES IN COORDINATION: 0

## 2018-01-11 ENCOUNTER — OFFICE VISIT (OUTPATIENT)
Dept: ENDOCRINOLOGY | Facility: CLINIC | Age: 38
End: 2018-01-11
Payer: COMMERCIAL

## 2018-01-11 VITALS
DIASTOLIC BLOOD PRESSURE: 91 MMHG | WEIGHT: 159.3 LBS | SYSTOLIC BLOOD PRESSURE: 133 MMHG | HEIGHT: 66 IN | HEART RATE: 67 BPM | BODY MASS INDEX: 25.6 KG/M2

## 2018-01-11 DIAGNOSIS — E03.9 ACQUIRED HYPOTHYROIDISM: Primary | ICD-10-CM

## 2018-01-11 DIAGNOSIS — E66.3 OVERWEIGHT: ICD-10-CM

## 2018-01-11 ASSESSMENT — PAIN SCALES - GENERAL: PAINLEVEL: NO PAIN (0)

## 2018-01-11 NOTE — LETTER
1/11/2018       RE: Kesha Carter  [address] [place] [id number]     Dear Colleague,    Thank you for referring your patient, Kesha Carter, to the [facility] ENDOCRINOLOGY at [facility]. Please see a copy of my visit note below.    Endocrinology Clinic Visit    Chief Complaint: Consult (HYPOTHYROIDISM )       Information obtained from:Patient    HPI: Kesha Carter is a 37 year old female with history of hypothyroidism, bipolar disorder and adjustment disorder with anxiety who is seen in consultation at Pia Mar's request for hypothyroidism.    She was diagnosed with hypothyroidism about 5-6 years ago. She has been on levothyroxine replacement. Dose was increased to 100 mcg daily in 10/2017 because TSH was 4.43. TSH had decreased to 2.82 when checked in 12/2017. She reports compliance with levothyroxine.     Patient had cholecystectomy about a year ago. Since that time she has been experiencing recurrent episodes of diarrhea, alternating with constipation. She has some abdominal pain with diarrhea. Usually no nausea or vomiting. No flushing or wheeze. Episodes last 2-3 days. She is on a restricted diet, with elimination of gluten and lactose. She has been seen by gastroenterology but no etiology for her symptoms has been found yet. She is planned to undergo a colonoscopy in near future. She is concerned that in spite of a very restricted diet, she has not been able to lose weight. Her weight is 159 lbs today and was about the same a few years back as well. She also reports frequent headaches since cholecystectomy and has h/o migraines. No history of facial puffiness or central obesity. No h/o proximal muscle weakness. No abdominal striae. Her periods are regular, she is on OCP. There has been no recent change in her medications, other than levothyroxine. She does have a family h/o obesity, hypothyroidism and DM2. She used to smoke a few cigarettes  per week off and on which she quit completely in 12/2017.    Medications:   Allergies   Allergen Reactions     Erythromycin Nausea and Vomiting       Current Outpatient Prescriptions   Medication Sig Dispense Refill     lithium 600 MG capsule Take 600 one day 900 mg the next day on an alternating schedule       norgestimate-ethinyl estradiol (ORTHO-CYCLEN, SPRINTEC) 0.25-35 MG-MCG per tablet Take 1 active pill daily for 12 weeks skip placebo pills 112 tablet 4     temazepam (RESTORIL) 15 MG capsule  30 capsule      cholestyramine (QUESTRAN) 4 GM/DOSE powder Take 2 g by mouth 3 times daily (with meals) Take as directed to control GI symptoms 540 g 3     levothyroxine (SYNTHROID/LEVOTHROID) 100 MCG tablet Take 1 tablet (100 mcg) by mouth daily 90 tablet 3     spironolactone (ALDACTONE) 50 MG tablet TAKE 1 TABLET BY MOUTH DAILY 90 tablet 3     tiZANidine (ZANAFLEX) 4 MG tablet Take 0.5-1 tablets (2-4 mg) by mouth 3 times daily 30 tablet 1     minocycline (MINOCIN/DYNACIN) 100 MG capsule Take 1 capsule (100 mg) by mouth 2 times daily 180 capsule 3     Simethicone (PHAZYME MAXIMUM STRENGTH) 250 MG CAPS Take by mouth as needed       Probiotic Product (PROBIOTIC DAILY PO)        polyethylene glycol (MIRALAX) powder Take 1 capful by mouth daily       multivitamin, therapeutic with minerals (MULTI-VITAMIN) TABS tablet Take 1 tablet by mouth daily 100 tablet 3     ondansetron (ZOFRAN ODT) 4 MG ODT tab Take 1-2 tablets (4-8 mg) by mouth 3 times daily (before meals) 20 tablet 1     OMEPRAZOLE PO Take by mouth daily       ZOLMitriptan (ZOMIG) 5 MG tablet Take 1 tablet (5 mg) by mouth at onset of headache for migraine May repeat in 2 hours. Max 2 tablets/24 hours. 6 tablet 1     clonazePAM (KLONOPIN) 0.5 MG tablet Take 0.5-1 tablets (0.25-0.5 mg) by mouth nightly as needed for other (insomnia) 20 tablet 0     ammonium lactate (LAC-HYDRIN) 12 % cream Apply topically 2 times daily 120 g 11       Review of Systems:    11 point  review system (Constitutional, HENT, Eyes, Respiratory, Cardiovascular, Gastrointestinal, Genitourinary, Musculoskeletal,Neurological, Psychiatric/Behavioural, Endocrine) is negative or is as per HPI above.    Past Medical History:   Diagnosis Date     Thyroid disease        Past Surgical History:   Procedure Laterality Date     C REPAIR CRUCIATE LIGAMENT,KNEE      Left acl repair     C/SECTION, LOW TRANSVERSE      , Low Transverse     C/SECTION, LOW TRANSVERSE      , Low Transverse     LAPAROSCOPIC CHOLECYSTECTOMY N/A 2017    Procedure: LAPAROSCOPIC CHOLECYSTECTOMY;  Surgeon: Tani Brenner MD;  Location: [facility]     MAMMOPLASTY AUGMENTATION BILATERAL  2010    Implants.     TUBAL LIGATION      With C/S       Family History   Problem Relation Age of Onset     Thyroid Disease Mother      Arthritis Mother      Hypertension Mother      Thyroid Disease Maternal Grandmother      Hypertension Maternal Grandmother      Cardiovascular Maternal Grandfather      HEART DISEASE Maternal Grandfather      Hypertension Maternal Grandfather      DIABETES Mother      Hypertension Paternal Grandmother      CEREBROVASCULAR DISEASE Paternal Grandfather      Anxiety Disorder Mother      Obesity Mother      Obesity Sister        Social History     Social History     Marital status:      Spouse name: Larry Carter     Number of children: 2     Years of education: 18+     Occupational History     Management in [employer]     Social History Main Topics     Smoking status: Former Smoker     Types: Cigarettes     Quit date: 2016     Smokeless tobacco: Never Used     Alcohol use Yes      Comment: Once a month      Drug use: No     Sexual activity: Yes     Partners: Male     Birth control/ protection: Female Surgical      Comment: Patient has had a tubal ligation.     Other Topics Concern     Parent/Sibling W/ Cabg, Mi Or Angioplasty Before 65f 55m? No      " Service No     Blood Transfusions No     Caffeine Concern No     Occupational Exposure No     Sleep Concern Yes     Stress Concern Yes     Bike Helmet Yes     Seat Belt Yes     Self-Exams Yes     Social History Narrative       Physical Examination:  BP (!) 133/91  Pulse 67  Ht 1.676 m (5' 6\")  Wt 72.3 kg (159 lb 4.8 oz)  BMI 25.71 kg/m2  Constitutional: Appears well,  NAD   EYES: Anicteric, PERRL, normal extra-ocular movements, no lid lag or retraction   HENT: AT/NC, Mucous membranes moist. Oropharynx is clear   Neck: No adenopathy. No thyromegaly   Cardiovascular: RRR, S1 and S2 normal  Pulmonary/Chest: CTAB. No wheezing or rales   Abdominal: +BS. Soft, Non tender to palpation, no striae  Neurological: Alert. CNII-XII intact. Muscle strength 5/5. 2+ DTR, no tremors  Extremities: No clubbing, cyanosis or edema   Skin: normal texture, color and temp  Lymphatic: no cervical lymphadenopathy.  Psychological: appropriate mood and affect     Labs/Imaging:     TSH   Date Value Ref Range Status   12/20/2017 2.82 0.40 - 4.00 mU/L Final   10/11/2017 4.43 (H) 0.40 - 4.00 mU/L Final   04/12/2017 2.63 0.40 - 4.00 mU/L Final   02/01/2016 1.06 0.40 - 4.00 mU/L Final   02/02/2015 2.53 0.40 - 4.00 mU/L Final     Comment:     Effective 7/30/2014, the reference range for this assay has changed to reflect   new instrumentation/methodology.       T4 Free   Date Value Ref Range Status   10/11/2017 0.97 0.76 - 1.46 ng/dL Final   02/01/2016 1.22 0.76 - 1.46 ng/dL Final   02/02/2015 1.18 0.76 - 1.46 ng/dL Final     Comment:     Effective 7/30/2014, the reference range for this assay has changed to reflect   new instrumentation/methodology.     05/02/2014 1.23 0.70 - 1.85 ng/dL Final   03/07/2014 1.40 0.70 - 1.85 ng/dL Final       Creatinine   Date Value Ref Range Status   10/11/2017 0.87 0.52 - 1.04 mg/dL Final   ]    Recent Labs   Lab Test  10/11/17   0830  03/29/13   0916  12/08/10   0805   CHOL  182  155  144   HDL  108  65  " 61   LDL  63  81  75   TRIG  54  49  42   CHOLHDLRATIO   --   2.0  2.0       Assessment/Treatment Plan:      # Weight Gain: Patient is concerned about weight gain or not been able to lose weight, is spite of strict dietary changes. Her BMI is 25.7 today, which is minimally above the normal range. Her weight was about the same a few years ago. She lost some weight in between and now is back to the same weight. It is not entirely clear what might be preventing her from losing weight. Her thyroid function tests were normal recently and she is on adequate replacement. Recent cortisol was normal and she does not exhibit any features of cortisol excess. We do not see any endocrine reasons to cause weight gain at this point. We discussed the possibility of meeting with a dietitian or referral to weight management clinic which patient would like to try after her colonoscopy.    - Continue dietary modifications and increase physical activity/exercise as tolerated.  - Referral to weight management clinic.    # Hypothyroidism: Clinically and biochemically euthyroid, advised to continue current levothyroxine dose.    # Episodes of diarrhea and constipation: We do not see an endocrine etiology for that at this point, advised to follow up with her PCP and gastroenterology.      - RTC as needed.         Patient was seen and discussed with Dr Ventura.    Clover Ballesteros  Endocrinology Fellow      Patient seen by me with fellow Dr Ballesteros.  Pt here for eval of intermittent diarrhea (which seemed to start after cholecystectomy) and also concerns about wt gain and perceived difficulty losing wt despite caloric restriction.  Hx does not suggest endocrine explanation for the diarrhea such as carcinoid, has had neg screen for sprue.  As far as wt concerns, do not see any physical findings or features to suggest evidence of endocrine cause such as cushings or acromegaly.  Pt is hypothyroid but appropriately replaced.  Discussed with pt.   Unfortunately we do not see anything from endocrine standpoint that would explain her sx.  We did discuss options to help with wt management.  Findings and plan as above.    Leandro Ventura MD   [phone]

## 2018-01-11 NOTE — MR AVS SNAPSHOT
"              After Visit Summary   1/11/2018    Kesha Carter    MRN: [id number]           Patient Information     Date Of Birth          1980        Visit Information        Provider Department      1/11/2018 8:30 AM Clover Ballesteros MD [facility]        Today's Diagnoses     Acquired hypothyroidism    -  1       Follow-ups after your visit        Who to contact     Please call your clinic at [phone] to:    Ask questions about your health    Make or cancel appointments    Discuss your medicines    Learn about your test results    Speak to your doctor   If you have compliments or concerns about an experience at your clinic, or if you wish to file a complaint, please contact [facility] Patient Relations at [phone] or email us at [email or web address]         Additional Information About Your Visit        MyChart Information     [employer] gives you secure access to your electronic health record. If you see a primary care provider, you can also send messages to your care team and make appointments. If you have questions, please call your primary care clinic.  If you do not have a primary care provider, please call [phone] and they will assist you.      [employer] is an electronic gateway that provides easy, online access to your medical records. With [employer], you can request a clinic appointment, read your test results, renew a prescription or communicate with your care team.     To access your existing account, please contact your [facility] or call [phone] for assistance.        Care EveryWhere ID     This is your Care EveryWhere ID. This could be used by other organizations to access your [facility] medical records  [id number]        Your Vitals Were     Pulse Height BMI (Body Mass Index)             67 1.676 m (5' 6\") 25.71 kg/m2          Blood Pressure from Last 3 Encounters:   01/11/18 (!) 133/91   10/11/17 " 122/78   06/15/17 121/84    Weight from Last 3 Encounters:   01/11/18 72.3 kg (159 lb 4.8 oz)   10/11/17 72.1 kg (159 lb)   06/15/17 68.2 kg (150 lb 6.4 oz)              Today, you had the following     No orders found for display       Primary Care Provider Office Phone # Fax #    Pia Mar [phone] [id number] [address]        Equal Access to Services     MICHAEL VARNER : Hadii aad ku hadasho Soomaali, waaxda luqadaha, qaybta kaalmada adeegyada, waxay idiin hayaan adeeg khheaven walters . So [facility] [phone].    ATENCIÓN: Si habla español, tiene a crain disposición servicios gratuitos de asistencia lingüística. LlMary [facility] [phone].    We comply with applicable federal civil rights laws and [place] laws. We do not discriminate on the basis of race, color, national origin, age, disability, sex, sexual orientation, or gender identity.            Thank you!     Thank you for choosing [facility] ENDOCRINOLOGY  for your care. Our goal is always to provide you with excellent care. Hearing back from our patients is one way we can continue to improve our services. Please take a few minutes to complete the written survey that you may receive in the mail after your visit with us. Thank you!             Your Updated Medication List - Protect others around you: Learn how to safely use, store and throw away your medicines at www.disposemymeds.org.          This list is accurate as of: 1/11/18  9:30 AM.  Always use your most recent med list.                   Brand Name Dispense Instructions for use Diagnosis    ammonium lactate 12 % cream    LAC-HYDRIN    120 g    Apply topically 2 times daily    Keratosis pilaris       cholestyramine 4 GM/DOSE powder    QUESTRAN    540 g    Take 2 g by mouth 3 times daily (with meals) Take as directed to control GI symptoms    Irritable bowel syndrome with both constipation and diarrhea       clonazePAM 0.5 MG tablet    klonoPIN    20 tablet     Take 0.5-1 tablets (0.25-0.5 mg) by mouth nightly as needed for other (insomnia)    Persistent insomnia       levothyroxine 100 MCG tablet    SYNTHROID/LEVOTHROID    90 tablet    Take 1 tablet (100 mcg) by mouth daily    Acquired hypothyroidism       lithium 600 MG capsule      Take 600 one day 900 mg the next day on an alternating schedule        minocycline 100 MG capsule    MINOCIN/DYNACIN    180 capsule    Take 1 capsule (100 mg) by mouth 2 times daily    Acne vulgaris       MIRALAX powder   Generic drug:  polyethylene glycol      Take 1 capful by mouth daily        Multi-vitamin Tabs tablet     100 tablet    Take 1 tablet by mouth daily    Abdominal pain, epigastric, Nausea, Bipolar disorder, in full remission, most recent episode manic (H)       norgestimate-ethinyl estradiol 0.25-35 MG-MCG per tablet    ORTHO-CYCLEN, SPRINTEC    112 tablet    Take 1 active pill daily for 12 weeks skip placebo pills    Acne vulgaris, Irritability, Menstrual migraine, intractable, without status migrainosus       OMEPRAZOLE PO      Take by mouth daily        ondansetron 4 MG ODT tab    ZOFRAN ODT    20 tablet    Take 1-2 tablets (4-8 mg) by mouth 3 times daily (before meals)    Nausea       PHAZYME MAXIMUM STRENGTH 250 MG Caps   Generic drug:  Simethicone      Take by mouth as needed        PROBIOTIC DAILY PO           spironolactone 50 MG tablet    ALDACTONE    90 tablet    TAKE 1 TABLET BY MOUTH DAILY    Acne vulgaris       temazepam 15 MG capsule    RESTORIL    30 capsule     Persistent disorder of initiating or maintaining sleep       tiZANidine 4 MG tablet    ZANAFLEX    30 tablet    Take 0.5-1 tablets (2-4 mg) by mouth 3 times daily    Midline low back pain with sciatica, sciatica laterality unspecified, unspecified chronicity       ZOLMitriptan 5 MG tablet    ZOMIG    6 tablet    Take 1 tablet (5 mg) by mouth at onset of headache for migraine May repeat in 2 hours. Max 2 tablets/24 hours.    Menstrual migraine,  intractable, without status migrainosus

## 2018-01-11 NOTE — PROGRESS NOTES
Endocrinology Clinic Visit    Chief Complaint: Consult (HYPOTHYROIDISM )       Information obtained from:Patient    HPI: Kesha Carter is a 37 year old female with history of hypothyroidism, bipolar disorder and adjustment disorder with anxiety who is seen in consultation at Pia Mar's request for hypothyroidism.    She was diagnosed with hypothyroidism about 5-6 years ago. She has been on levothyroxine replacement. Dose was increased to 100 mcg daily in 10/2017 because TSH was 4.43. TSH had decreased to 2.82 when checked in 12/2017. She reports compliance with levothyroxine.     Patient had cholecystectomy about a year ago. Since that time she has been experiencing recurrent episodes of diarrhea, alternating with constipation. She has some abdominal pain with diarrhea. Usually no nausea or vomiting. No flushing or wheeze. Episodes last 2-3 days. She is on a restricted diet, with elimination of gluten and lactose. She has been seen by gastroenterology but no etiology for her symptoms has been found yet. She is planned to undergo a colonoscopy in near future. She is concerned that in spite of a very restricted diet, she has not been able to lose weight. Her weight is 159 lbs today and was about the same a few years back as well. She also reports frequent headaches since cholecystectomy and has h/o migraines. No history of facial puffiness or central obesity. No h/o proximal muscle weakness. No abdominal striae. Her periods are regular, she is on OCP. There has been no recent change in her medications, other than levothyroxine. She does have a family h/o obesity, hypothyroidism and DM2. She used to smoke a few cigarettes per week off and on which she quit completely in 12/2017.    Medications:   Allergies   Allergen Reactions     Erythromycin Nausea and Vomiting       Current Outpatient Prescriptions   Medication Sig Dispense Refill     lithium 600 MG capsule Take 600 one day 900 mg the next day on an  alternating schedule       norgestimate-ethinyl estradiol (ORTHO-CYCLEN, SPRINTEC) 0.25-35 MG-MCG per tablet Take 1 active pill daily for 12 weeks skip placebo pills 112 tablet 4     temazepam (RESTORIL) 15 MG capsule  30 capsule      cholestyramine (QUESTRAN) 4 GM/DOSE powder Take 2 g by mouth 3 times daily (with meals) Take as directed to control GI symptoms 540 g 3     levothyroxine (SYNTHROID/LEVOTHROID) 100 MCG tablet Take 1 tablet (100 mcg) by mouth daily 90 tablet 3     spironolactone (ALDACTONE) 50 MG tablet TAKE 1 TABLET BY MOUTH DAILY 90 tablet 3     tiZANidine (ZANAFLEX) 4 MG tablet Take 0.5-1 tablets (2-4 mg) by mouth 3 times daily 30 tablet 1     minocycline (MINOCIN/DYNACIN) 100 MG capsule Take 1 capsule (100 mg) by mouth 2 times daily 180 capsule 3     Simethicone (PHAZYME MAXIMUM STRENGTH) 250 MG CAPS Take by mouth as needed       Probiotic Product (PROBIOTIC DAILY PO)        polyethylene glycol (MIRALAX) powder Take 1 capful by mouth daily       multivitamin, therapeutic with minerals (MULTI-VITAMIN) TABS tablet Take 1 tablet by mouth daily 100 tablet 3     ondansetron (ZOFRAN ODT) 4 MG ODT tab Take 1-2 tablets (4-8 mg) by mouth 3 times daily (before meals) 20 tablet 1     OMEPRAZOLE PO Take by mouth daily       ZOLMitriptan (ZOMIG) 5 MG tablet Take 1 tablet (5 mg) by mouth at onset of headache for migraine May repeat in 2 hours. Max 2 tablets/24 hours. 6 tablet 1     clonazePAM (KLONOPIN) 0.5 MG tablet Take 0.5-1 tablets (0.25-0.5 mg) by mouth nightly as needed for other (insomnia) 20 tablet 0     ammonium lactate (LAC-HYDRIN) 12 % cream Apply topically 2 times daily 120 g 11       Review of Systems:    11 point review system (Constitutional, HENT, Eyes, Respiratory, Cardiovascular, Gastrointestinal, Genitourinary, Musculoskeletal,Neurological, Psychiatric/Behavioural, Endocrine) is negative or is as per HPI above.    Past Medical History:   Diagnosis Date     Thyroid disease 2013       Past  Surgical History:   Procedure Laterality Date     C REPAIR CRUCIATE LIGAMENT,KNEE      Left acl repair     C/SECTION, LOW TRANSVERSE      , Low Transverse     C/SECTION, LOW TRANSVERSE      , Low Transverse     LAPAROSCOPIC CHOLECYSTECTOMY N/A 2017    Procedure: LAPAROSCOPIC CHOLECYSTECTOMY;  Surgeon: Tani Brenner MD;  Location: [facility]     MAMMOPLASTY AUGMENTATION BILATERAL  2010    Implants.     TUBAL LIGATION      With C/S       Family History   Problem Relation Age of Onset     Thyroid Disease Mother      Arthritis Mother      Hypertension Mother      Thyroid Disease Maternal Grandmother      Hypertension Maternal Grandmother      Cardiovascular Maternal Grandfather      HEART DISEASE Maternal Grandfather      Hypertension Maternal Grandfather      DIABETES Mother      Hypertension Paternal Grandmother      CEREBROVASCULAR DISEASE Paternal Grandfather      Anxiety Disorder Mother      Obesity Mother      Obesity Sister        Social History     Social History     Marital status:      Spouse name: Larry Carter     Number of children: 2     Years of education: 18+     Occupational History     Management in [employer]     Social History Main Topics     Smoking status: Former Smoker     Types: Cigarettes     Quit date: 2016     Smokeless tobacco: Never Used     Alcohol use Yes      Comment: Once a month      Drug use: No     Sexual activity: Yes     Partners: Male     Birth control/ protection: Female Surgical      Comment: Patient has had a tubal ligation.     Other Topics Concern     Parent/Sibling W/ Cabg, Mi Or Angioplasty Before 65f 55m? No      Service No     Blood Transfusions No     Caffeine Concern No     Occupational Exposure No     Sleep Concern Yes     Stress Concern Yes     Bike Helmet Yes     Seat Belt Yes     Self-Exams Yes     Social History Narrative       Physical Examination:  BP (!) 133/91  Pulse 67  Ht  "1.676 m (5' 6\")  Wt 72.3 kg (159 lb 4.8 oz)  BMI 25.71 kg/m2  Constitutional: Appears well,  NAD   EYES: Anicteric, PERRL, normal extra-ocular movements, no lid lag or retraction   HENT: AT/NC, Mucous membranes moist. Oropharynx is clear   Neck: No adenopathy. No thyromegaly   Cardiovascular: RRR, S1 and S2 normal  Pulmonary/Chest: CTAB. No wheezing or rales   Abdominal: +BS. Soft, Non tender to palpation, no striae  Neurological: Alert. CNII-XII intact. Muscle strength 5/5. 2+ DTR, no tremors  Extremities: No clubbing, cyanosis or edema   Skin: normal texture, color and temp  Lymphatic: no cervical lymphadenopathy.  Psychological: appropriate mood and affect     Labs/Imaging:     TSH   Date Value Ref Range Status   12/20/2017 2.82 0.40 - 4.00 mU/L Final   10/11/2017 4.43 (H) 0.40 - 4.00 mU/L Final   04/12/2017 2.63 0.40 - 4.00 mU/L Final   02/01/2016 1.06 0.40 - 4.00 mU/L Final   02/02/2015 2.53 0.40 - 4.00 mU/L Final     Comment:     Effective 7/30/2014, the reference range for this assay has changed to reflect   new instrumentation/methodology.       T4 Free   Date Value Ref Range Status   10/11/2017 0.97 0.76 - 1.46 ng/dL Final   02/01/2016 1.22 0.76 - 1.46 ng/dL Final   02/02/2015 1.18 0.76 - 1.46 ng/dL Final     Comment:     Effective 7/30/2014, the reference range for this assay has changed to reflect   new instrumentation/methodology.     05/02/2014 1.23 0.70 - 1.85 ng/dL Final   03/07/2014 1.40 0.70 - 1.85 ng/dL Final       Creatinine   Date Value Ref Range Status   10/11/2017 0.87 0.52 - 1.04 mg/dL Final   ]    Recent Labs   Lab Test  10/11/17   0830  03/29/13   0916  12/08/10   0805   CHOL  182  155  144   HDL  108  65  61   LDL  63  81  75   TRIG  54  49  42   CHOLHDLRATIO   --   2.0  2.0       Assessment/Treatment Plan:      # Weight Gain: Patient is concerned about weight gain or not been able to lose weight, is spite of strict dietary changes. Her BMI is 25.7 today, which is minimally above the " normal range. Her weight was about the same a few years ago. She lost some weight in between and now is back to the same weight. It is not entirely clear what might be preventing her from losing weight. Her thyroid function tests were normal recently and she is on adequate replacement. Recent cortisol was normal and she does not exhibit any features of cortisol excess. We do not see any endocrine reasons to cause weight gain at this point. We discussed the possibility of meeting with a dietitian or referral to weight management clinic which patient would like to try after her colonoscopy.    - Continue dietary modifications and increase physical activity/exercise as tolerated.  - Referral to weight management clinic.    # Hypothyroidism: Clinically and biochemically euthyroid, advised to continue current levothyroxine dose.    # Episodes of diarrhea and constipation: We do not see an endocrine etiology for that at this point, advised to follow up with her PCP and gastroenterology.      - RTC as needed.         Patient was seen and discussed with Dr Ventura.    Clover Ballesteros  Endocrinology Fellow      Patient seen by me with fellow Dr Ballesteros.  Pt here for eval of intermittent diarrhea (which seemed to start after cholecystectomy) and also concerns about wt gain and perceived difficulty losing wt despite caloric restriction.  Hx does not suggest endocrine explanation for the diarrhea such as carcinoid, has had neg screen for sprue.  As far as wt concerns, do not see any physical findings or features to suggest evidence of endocrine cause such as cushings or acromegaly.  Pt is hypothyroid but appropriately replaced.  Discussed with pt.  Unfortunately we do not see anything from endocrine standpoint that would explain her sx.  We did discuss options to help with wt management.  Findings and plan as above.    Leandro Ventura MD   [phone]

## 2018-01-19 NOTE — DISCHARGE INSTRUCTIONS
Appt within 24 Hours-Patient declined. Requesting a message to be sent to PCP requesting change in antibiotic. Please advise.  Reason for Disposition  • [1] Taking antibiotic > 72 hours (3 days) AND [2] sinus pain not improved    Protocols used: SINUS INFECTION FOLLOW-UP CALL-A-AH     Wash incisions daily with soap and water. Some mild redness or swelling is expected. If draining, cover with dry gauze.    No heavy lifting (less than 30 pounds) for 1 month.    Okay to use ice pack over wound as necessary for comfort.    Use pain medication as necessary but avoid constipating side effects. Ibuprofen okay but avoid Tylenol as your pain medication already contains this.    Diet as tolerated. No restrictions.    Follow up with Dr Brenner in 1-2 weeks.    Most people take the rest of the week off and return to work the following Monday. You may return sooner as pain allows. During your follow-up appointment, Dr. Brenner will give you a formal letter for your work with any restrictions detailed.  All disability or other such paperwork will be addressed at that time.                                Same Day Surgery Discharge Instructions  Special Precautions After Surgery - Adult    1. It is not unusual to feel lightheaded or faint, up to 24 hours after surgery or while taking pain medication.  If you have these symptoms; sit for a few minutes before standing and have someone assist you when getting up.  2. You should rest and relax for the next 24 hours and must have someone stay with you for at least 24 hours after your discharge.  3. DO NOT DRIVE any vehicle or operate mechanical equipment for 24 hours following the end of your surgery.  DO NOT DRIVE while taking narcotic pain medications that have been prescribed by your physician.  If you had a limb operated on, you must be able to use it fully to drive.  4. DO NOT drink alcoholic beverages for 24 hours following surgery or while taking prescription pain medication.  5. Drink clear liquids (apple juice, ginger ale, broth, 7-Up, etc.).  Progress to your regular diet as you feel able.  6. Any questions call your physician and do not make important decisions for 24 hours.        [facility]:  [phone]     Same Day Surgery [phone], Monday  thru Friday 6am-9pm.

## 2018-01-25 ENCOUNTER — TRANSFERRED RECORDS (OUTPATIENT)
Dept: HEALTH INFORMATION MANAGEMENT | Facility: CLINIC | Age: 38
End: 2018-01-25

## 2018-02-01 ENCOUNTER — THERAPY VISIT (OUTPATIENT)
Dept: CHIROPRACTIC MEDICINE | Facility: CLINIC | Age: 38
End: 2018-02-01
Payer: COMMERCIAL

## 2018-02-01 DIAGNOSIS — M54.50 LUMBAGO: ICD-10-CM

## 2018-02-01 DIAGNOSIS — M62.838 SPASM OF MUSCLE: ICD-10-CM

## 2018-02-01 DIAGNOSIS — M99.05 SEGMENTAL DYSFUNCTION OF PELVIC REGION: Primary | ICD-10-CM

## 2018-02-01 DIAGNOSIS — M99.03 SEGMENTAL DYSFUNCTION OF LUMBAR REGION: ICD-10-CM

## 2018-02-01 DIAGNOSIS — M99.02 SEGMENTAL DYSFUNCTION OF THORACIC REGION: ICD-10-CM

## 2018-02-01 PROCEDURE — 98941 CHIROPRACT MANJ 3-4 REGIONS: CPT | Mod: AT | Performed by: CHIROPRACTOR

## 2018-02-01 PROCEDURE — 99203 OFFICE O/P NEW LOW 30 MIN: CPT | Mod: 25 | Performed by: CHIROPRACTOR

## 2018-02-01 PROCEDURE — 97110 THERAPEUTIC EXERCISES: CPT | Performed by: CHIROPRACTOR

## 2018-02-01 NOTE — PROGRESS NOTES
Initial Chiropractic Clinic Visit    PCP: Pia Mar    Kesha Carter is a 37 year old female who is seen  in consultation at the request of  Dr. Pia Mar, presenting with low back pain, stomach issues. Patient reports that the onset was 2-3 years ago but mostly painful 1 year ago When asked, patient denies:, falling or slipping. 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 Patient notes that due to symptoms, they can only sit for more than 1 hour. Kesha Carter notes stomach and low back issues rated at a 3/10 and  prior to this onset it was 0/10.    s/p gall bladder removal 1 year ago.    Location of Pain: right, lower back, stomach areas at the following level(s) L1, L2, L3 , L4 , L5 , Sacrum  and PSIS Right   Duration of Pain: 1-2 year(s)  Rating of Pain at worst: 9/10  Rating of Pain Currently: 3/10  Symptoms are better with: Other medications and Rest  Symptoms are worse with: sitting, standing and stairs  Additional Features: weakness     Health History  as reported by the patient:    How does the patient rate their own health:   Fair    Current or past medical history:   Changes in bladder/bowel, Mental Illness, Migraines/headaches, Pain at night/rest and Thyroid problems    Medical allergies  None    Past Traumas/Surgeries  Orthopedic: ACL surgery 20+ years ago and Other: 2 C-sections, 1 year ago gall bladder removal, breast augmentation    Family History  This patient has no significant family history    Medications:  Sleep and Thyroid    Occupation: office/management    Primary job tasks:   Computer work and Prolonged sitting    Barriers as home/work:   no railing on stairs and transportation    Additional health Issues:     S/p gall bladder removal 1 year ago          Kesha was asked to complete the Oswestry Low Back Disability Index and Todd Start Back screening tool, today in the office.  Oswestry Disability score: 30%.  Todd Start Total  "Score:4 Sub Score: 3     Review of Systems  Musculoskeletal: as above  Remainder of review of systems is negative including constitutional, CV, pulmonary, GI, Skin and Neurologic except as noted in HPI or medical history.    Past Medical History:   Diagnosis Date     Thyroid disease      Past Surgical History:   Procedure Laterality Date     C REPAIR CRUCIATE LIGAMENT,KNEE      Left acl repair     C/SECTION, LOW TRANSVERSE      , Low Transverse     C/SECTION, LOW TRANSVERSE      , Low Transverse     LAPAROSCOPIC CHOLECYSTECTOMY N/A 2017    Procedure: LAPAROSCOPIC CHOLECYSTECTOMY;  Surgeon: Tani Brenner MD;  Location: [facility]     MAMMOPLASTY AUGMENTATION BILATERAL  2010    Implants.     TUBAL LIGATION      With C/S     Objective  There were no vitals taken for this visit.      GENERAL APPEARANCE: healthy, alert and no distress   GAIT: NORMAL  SKIN: no suspicious lesions or rashes  NEURO: Normal strength and tone, mentation intact and speech normal  PSYCH:  mentation appears normal and affect normal/bright    Low back exam:    Inspection:  \"     no visible deformity in the low back       normal skin\",    ROM:       limited flexion due to pain       limited extension due to pain    Tender:       paraspinal muscles    Non Tender:       remainder of lumbar spine    Strength:       hip flexion 5/5 Normal       knee extension 5/5 Normal       ankle dorsiflexion 5/5 Normal       ankle plantarflexion 5/5 Normal       dorsiflexion of the great toe 5/5 Normal    Reflexes:       patellar (L3, L4) 2 bilaterally    Sensation:      grossly intact throughout lower extremities    Special tests:  SLR - Right negative and Left negative, Fabere - Right positive, Yeoman's - Right positive, and Left positive, Abbie - Right positive, and Left positive, and Ely's - Right positive, and Left positive,     Segmental spinal dysfunction/restrictions found at::  T11 Right rotation restricted  T12 " Right rotation restricted  L4 Right rotation restricted  L5 Right rotation restricted  PSIS Left Flexion restriction  PSIS Right Extension restriction.    The following soft tissue hypotonicities were observed:Piriformis: right, referred pain: no    Trigger points were found in:Piriformis    Muscle spasm found in:Piriformis      Radiology:      Assessment:    1. Segmental dysfunction of pelvic region    2. Lumbago    3. Segmental dysfunction of lumbar region    4. Spasm of muscle    5. Segmental dysfunction of thoracic region        RX ordered/plan of care  Anticipated outcomes  Possible risks and side effects    After discussing the risk and benefits of care, patient consented to treatment    Prognosis: Good      Patient's condition:  Patient had restrictions pre-manipulation    Treatment effectiveness:  Post manipulation there is better intersegmental movement and Patient claims to feel looser post manipulation      Plan:    Procedures:  Evaluation and Management:  51769 Moderate level exam 30 min    CMT:  13052 Chiropractic manipulative treatment 3-4 regions performed   Thoracic: Activator, T11, T12, Prone  Lumbar: Activator, L4, L5, Prone  Pelvis: Drop Table, PSIS Left , PSIS Right , Prone    Modalities:  33265: Heat:   For 5 min to Piriformis  90682: MSTM:  To Piriformis  for 5 min    Therapeutic procedures:  45497: Therapeutic Exercises  Direct one-on-one treatment to develop flexibilty, range of motion, strength and endurance.   The following were demonstrated and practiced:   Stretches - Reviewed stretches today.  Crossed leg piriformis stretch seated  Opposite knee to opposite chest supine  Crossed leg piriformis stretch supine    Response to Treatment  Reduction in symptoms as reported by patient      Treatment plan and goals:  Goals:  PAIN: the patient specific goal of thier symptoms is to attain the pre-inury state of 0/10 on the VAS    Frequency of care  Duration of care is estimated to be 8 weeks, from  the initial treatment.  It is estimated that the patient will need a total of 8 visits to resolve this episode.  For the initial therapeutic trial of care, the frequency is recommended at 1 X week, once daily.  A reevaluation would be clinically appropriate in 8 visits, to determine progress and further course of care.    In-Office Treatment  Evaluation  Spinal Chiropractic Manipulative Therapy:    Modalities: Heat and MSTM  Therapeutic exercises:  Stretches         Recommendations:    Instructions:ice 20 minutes every other hour as needed and stretch as instructed at visit    Follow-up:  Return to care in one week.       Discussed the assessment with the patient.      Disclaimer: This note consists of symbols derived from keyboarding, dictation and/or voice recognition software. As a result, there may be errors in the script that have gone undetected. Please consider this when interpreting information found in this chart.

## 2018-02-07 ENCOUNTER — THERAPY VISIT (OUTPATIENT)
Dept: CHIROPRACTIC MEDICINE | Facility: CLINIC | Age: 38
End: 2018-02-07
Payer: COMMERCIAL

## 2018-02-07 DIAGNOSIS — M99.05 SEGMENTAL DYSFUNCTION OF PELVIC REGION: Primary | ICD-10-CM

## 2018-02-07 DIAGNOSIS — M99.02 THORACIC SEGMENT DYSFUNCTION: ICD-10-CM

## 2018-02-07 DIAGNOSIS — M54.50 LUMBAGO: ICD-10-CM

## 2018-02-07 DIAGNOSIS — M62.838 SPASM OF MUSCLE: ICD-10-CM

## 2018-02-07 DIAGNOSIS — M99.03 SOMATIC DYSFUNCTION OF LUMBAR REGION: ICD-10-CM

## 2018-02-07 PROCEDURE — 98941 CHIROPRACT MANJ 3-4 REGIONS: CPT | Mod: AT | Performed by: CHIROPRACTOR

## 2018-02-07 NOTE — PROGRESS NOTES
Visit #:  2 of 8, based on treatment plan    Subjective:  Kesha Carter is a 37 year old female who is seen in f/u up for:        Segmental dysfunction of pelvic region  Lumbago  Somatic dysfunction of lumbar region  Spasm of muscle  Thoracic segment dysfunction.     Since last visit on 2/1/2018,  Kesha Carter reports the following changes: Pain immediately after last treatment: 6/10 and their pain level today 0/10.  Patient reports that she has a sore first day, felt good next day, the 3rd day she was doing some painting but did some stretches which helped. Yesterday she had some radiating pain in L leg but that also was alleviated with some stretching    Area of chief complaint:  Lumbar :  Symptoms are graded at 0/10. The quality is described as stiff.  Motion has increased, but is still not normal. Patient feels that they are improved due to a reduction in symptoms.        Objective:  The following was observed:    P: palpatory tenderness, piriformis R    A: static palpation demonstrates intersegmental asymmetry , thoracic, lumbar, pelvis    R: motion palpation notes restricted motion, :  T10 Extension restriction  T11 Extension restriction  T12 Extension restriction  L4 Right rotation restricted  L5 Right rotation restricted  PSIS Right Extension restriction    T: hypertonicity at: Piriformis R>>L      Assessment:    Segmental spinal dysfunction/restrictions found at:  T10  T11  T12  L4  L5  PSIS Right    Diagnoses:      1. Segmental dysfunction of pelvic region    2. Lumbago    3. Somatic dysfunction of lumbar region    4. Spasm of muscle    5. Thoracic segment dysfunction        Patient's condition:  Patient had restrictions pre-manipulation    Treatment effectiveness:  Post manipulation there is better intersegmental movement and Patient claims to feel looser post manipulation      Procedures:  CMT:  13668 Chiropractic manipulative treatment 3-4 regions performed   Thoracic: Activator, T10, T11,  T12, Prone  Lumbar: Activator, L4, L5, Prone  Pelvis: Drop Table, PSIS Right , Prone    Modalities:  47027: MSTM:  To Piriformis  for 5 min    Therapeutic procedures:  None      Prognosis: Good    Progress towards Goals: Patient is making progress towards the goal     Response to Treatment:   Reduction in symptoms as reported by patient      Recommendations:    Instructions:ice 20 minutes every other hour as needed and stretch as instructed at visit    Follow-up:  Return to care in one week

## 2018-02-14 ENCOUNTER — THERAPY VISIT (OUTPATIENT)
Dept: CHIROPRACTIC MEDICINE | Facility: CLINIC | Age: 38
End: 2018-02-14
Payer: COMMERCIAL

## 2018-02-14 DIAGNOSIS — M62.838 SPASM OF MUSCLE: ICD-10-CM

## 2018-02-14 DIAGNOSIS — M54.50 LUMBAGO: ICD-10-CM

## 2018-02-14 DIAGNOSIS — M99.03 SEGMENTAL DYSFUNCTION OF LUMBAR REGION: ICD-10-CM

## 2018-02-14 DIAGNOSIS — M99.02 THORACIC SEGMENT DYSFUNCTION: ICD-10-CM

## 2018-02-14 DIAGNOSIS — M99.05 SEGMENTAL DYSFUNCTION OF PELVIC REGION: Primary | ICD-10-CM

## 2018-02-14 PROCEDURE — 98941 CHIROPRACT MANJ 3-4 REGIONS: CPT | Mod: AT | Performed by: CHIROPRACTOR

## 2018-02-14 NOTE — PROGRESS NOTES
Visit #:  3 of 8, based on treatment plan    Subjective:  Kesha Carter is a 37 year old female who is seen in f/u up for:        Segmental dysfunction of pelvic region  Lumbago  Segmental dysfunction of lumbar region  Spasm of muscle  Thoracic segment dysfunction.     Since last visit on 2/7/2018,  Kesha Carter reports the following changes: Pain immediately after last treatment: 1/10 and their pain level today 1/10.  Patient reports no radiating pain since last adjustment. Kesha is still feeling a little tight in her lower lumbar but not enough to be causing pain.  She is able to perform most task pain free but is still feeling stiffness when she does them.    Area of chief complaint:  Lumbar :  Symptoms are graded at 0/10. The quality is described as stiff.  Motion has increased, but is still not normal. Patient feels that they are improved due to a reduction in symptoms.        Objective:  The following was observed:    P: palpatory tenderness, piriformis R    A: static palpation demonstrates intersegmental asymmetry , thoracic, lumbar, pelvis    R: motion palpation notes restricted motion, :  T10 Extension restriction  T11 Extension restriction  T12 Extension restriction  L4 Right rotation restricted  L5 Right rotation restricted  PSIS Right Extension restriction    T: hypertonicity at: Piriformis R>>L      Assessment:    Segmental spinal dysfunction/restrictions found at:  T10  T11  T12  L4  L5  PSIS Right    Diagnoses:      1. Segmental dysfunction of pelvic region    2. Lumbago    3. Segmental dysfunction of lumbar region    4. Spasm of muscle    5. Thoracic segment dysfunction        Patient's condition:  Patient had restrictions pre-manipulation    Treatment effectiveness:  Post manipulation there is better intersegmental movement and Patient claims to feel looser post manipulation      Procedures:  CMT:  50549 Chiropractic manipulative treatment 3-4 regions performed   Thoracic: Activator,  T10, T11, T12, Prone  Lumbar: Activator, L4, L5, Prone  Pelvis: Drop Table, PSIS Right , Prone    Modalities:  06468: MSTM:  To Piriformis  for 5 min    Therapeutic procedures:  None      Prognosis: Good    Progress towards Goals: Patient is making progress towards the goal     Response to Treatment:   Reduction in symptoms as reported by patient      Recommendations:    Instructions:ice 20 minutes every other hour as needed and stretch as instructed at visit    Follow-up:  Return to care in one week

## 2018-02-22 ENCOUNTER — THERAPY VISIT (OUTPATIENT)
Dept: CHIROPRACTIC MEDICINE | Facility: CLINIC | Age: 38
End: 2018-02-22
Payer: COMMERCIAL

## 2018-02-22 DIAGNOSIS — M99.05 SEGMENTAL DYSFUNCTION OF PELVIC REGION: Primary | ICD-10-CM

## 2018-02-22 DIAGNOSIS — M99.03 SEGMENTAL DYSFUNCTION OF LUMBAR REGION: ICD-10-CM

## 2018-02-22 DIAGNOSIS — M54.50 LUMBAGO: ICD-10-CM

## 2018-02-22 DIAGNOSIS — M99.02 THORACIC SEGMENT DYSFUNCTION: ICD-10-CM

## 2018-02-22 DIAGNOSIS — M62.838 SPASM OF MUSCLE: ICD-10-CM

## 2018-02-22 PROCEDURE — 98941 CHIROPRACT MANJ 3-4 REGIONS: CPT | Mod: AT | Performed by: CHIROPRACTOR

## 2018-02-22 NOTE — PROGRESS NOTES
Visit #:  4 of 8, based on treatment plan    Subjective:  Kesha Carter is a 37 year old female who is seen in f/u up for:        Segmental dysfunction of pelvic region  Lumbago  Segmental dysfunction of lumbar region  Spasm of muscle  Thoracic segment dysfunction.     Since last visit on 2/14/2018,  Kesha Carter reports the following changes: Pain immediately after last treatment: 1/10 and their pain level today 0/10.  Patient reports no  pain since last adjustment. Kesha reports that rather than pain she has been feeling stiff systemically since last visit.  She does note that she has been under a lot of stress and has not been able to work out since she was last in.    Area of chief complaint:  Lumbar :  Symptoms are graded at 0/10. The quality is described as stiff.  Motion has increased, but is still not normal. Patient feels that they are improved due to a reduction in symptoms.        Objective:  The following was observed:    P: palpatory tenderness, piriformis R    A: static palpation demonstrates intersegmental asymmetry , thoracic, lumbar, pelvis    R: motion palpation notes restricted motion, :  T10 Extension restriction  T11 Extension restriction  T12 Extension restriction  L4 Right rotation restricted  L5 Right rotation restricted  PSIS Right Extension restriction    T: hypertonicity at: Piriformis R>>L      Assessment:    Segmental spinal dysfunction/restrictions found at:  T10  T11  T12  L4  L5  PSIS Right    Diagnoses:      1. Segmental dysfunction of pelvic region    2. Lumbago    3. Segmental dysfunction of lumbar region    4. Spasm of muscle    5. Thoracic segment dysfunction        Patient's condition:  Patient had restrictions pre-manipulation    Treatment effectiveness:  Post manipulation there is better intersegmental movement and Patient claims to feel looser post manipulation      Procedures:  CMT:  08596 Chiropractic manipulative treatment 3-4 regions performed   Thoracic:  Activator, T10, T11, T12, Prone  Lumbar: Activator, L4, L5, Prone  Pelvis: Drop Table, PSIS Right , Prone    Modalities:  89009: MSTM:  To Piriformis  for 5 min    Therapeutic procedures:  None      Prognosis: Good    Progress towards Goals: Patient is making progress towards the goal     Response to Treatment:   Reduction in symptoms as reported by patient      Recommendations:    Instructions:ice 20 minutes every other hour as needed and stretch as instructed at visit    Follow-up:  Return to care in one week

## 2018-03-14 ENCOUNTER — THERAPY VISIT (OUTPATIENT)
Dept: CHIROPRACTIC MEDICINE | Facility: CLINIC | Age: 38
End: 2018-03-14
Payer: COMMERCIAL

## 2018-03-14 DIAGNOSIS — M62.838 SPASM OF MUSCLE: ICD-10-CM

## 2018-03-14 DIAGNOSIS — M99.02 THORACIC SEGMENT DYSFUNCTION: ICD-10-CM

## 2018-03-14 DIAGNOSIS — M99.03 SEGMENTAL DYSFUNCTION OF LUMBAR REGION: ICD-10-CM

## 2018-03-14 DIAGNOSIS — M54.50 LUMBAGO: ICD-10-CM

## 2018-03-14 DIAGNOSIS — M99.05 SEGMENTAL DYSFUNCTION OF PELVIC REGION: Primary | ICD-10-CM

## 2018-03-14 PROCEDURE — 98941 CHIROPRACT MANJ 3-4 REGIONS: CPT | Mod: AT | Performed by: CHIROPRACTOR

## 2018-03-14 NOTE — PROGRESS NOTES
Visit #:  5 of 8, based on treatment plan    Subjective:  Kesha Carter is a 37 year old female who is seen in f/u up for:        Segmental dysfunction of pelvic region  Lumbago  Segmental dysfunction of lumbar region  Spasm of muscle  Thoracic segment dysfunction.     Since last visit on 2/22/2018,  Kesha Carter reports the following changes: Pain immediately after last treatment: 0/10 and their pain level today 4/10.  Patient reports that her back has been painful, especially with sleep, she reports lately that both sides have been painful and has had radiating pain to the mid thigh. Reports she no longer has been able to work out due to pain. Usually she gets about 5 days of relief and then regresses. She's a stomach sleeper and reports struggling to get moving in the morning.    Area of chief complaint:  Lumbar :  Symptoms are graded at 4/10. The quality is described as stiff.  Motion has increased, but is still not normal. Patient feels that they are improved due to a reduction in symptoms.        Objective:  The following was observed:    P: palpatory tenderness, piriformis R    A: static palpation demonstrates intersegmental asymmetry , thoracic, lumbar, pelvis    R: motion palpation notes restricted motion, :  T10 Extension restriction  T11 Extension restriction  T12 Extension restriction  L4 Right rotation restricted  L5 Right rotation restricted  PSIS Right Extension restriction    T: hypertonicity at: Piriformis R>>L      Assessment:    Segmental spinal dysfunction/restrictions found at:  T10  T11  T12  L4  L5  PSIS Right    Diagnoses:      1. Segmental dysfunction of pelvic region    2. Lumbago    3. Segmental dysfunction of lumbar region    4. Spasm of muscle    5. Thoracic segment dysfunction        Patient's condition:  Patient had restrictions pre-manipulation    Treatment effectiveness:  Post manipulation there is better intersegmental movement and Patient claims to feel looser post  manipulation      Procedures:  CMT:  63503 Chiropractic manipulative treatment 3-4 regions performed   Thoracic: Diversified, T10, T11, T12, Prone  Lumbar: Activator, L4, L5, Prone  Pelvis: Drop Table, PSIS Right , Prone    Modalities:  54655: MSTM:  To Piriformis  for 5 min    Therapeutic procedures:  Revised crossed leg piriformis stretch - 5min      Prognosis: Good    Progress towards Goals: Patient is making progress towards the goal     Response to Treatment:   Reduction in symptoms as reported by patient      Recommendations:    Instructions:ice 20 minutes every other hour as needed and stretch as instructed at visit    Follow-up:  Return to care in one week

## 2018-03-21 ENCOUNTER — THERAPY VISIT (OUTPATIENT)
Dept: CHIROPRACTIC MEDICINE | Facility: CLINIC | Age: 38
End: 2018-03-21
Payer: COMMERCIAL

## 2018-03-21 DIAGNOSIS — M62.838 SPASM OF MUSCLE: ICD-10-CM

## 2018-03-21 DIAGNOSIS — M54.50 LUMBAGO: ICD-10-CM

## 2018-03-21 DIAGNOSIS — M99.02 THORACIC SEGMENT DYSFUNCTION: ICD-10-CM

## 2018-03-21 DIAGNOSIS — M99.05 SEGMENTAL DYSFUNCTION OF PELVIC REGION: Primary | ICD-10-CM

## 2018-03-21 DIAGNOSIS — M99.03 SEGMENTAL DYSFUNCTION OF LUMBAR REGION: ICD-10-CM

## 2018-03-21 PROCEDURE — 98941 CHIROPRACT MANJ 3-4 REGIONS: CPT | Mod: AT | Performed by: CHIROPRACTOR

## 2018-03-21 NOTE — PROGRESS NOTES
Visit #:  6 of 8, based on treatment plan    Subjective:  Kesha Carter is a 37 year old female who is seen in f/u up for:        Segmental dysfunction of pelvic region  Lumbago  Segmental dysfunction of lumbar region  Spasm of muscle  Thoracic segment dysfunction.     Since last visit on 3/14/2018,  Kesha Carter reports the following changes: Pain immediately after last treatment: 0/10 and their pain level today 4/10.  Patient reports that her back has been very tight and painful this past week.  Last weekend she had some food poisoning and she has been working on the opening of a new clinic.  So she has been under a lot of stress.    Area of chief complaint:  Lumbar :  Symptoms are graded at 4/10. The quality is described as stiff.  Motion has increased, but is still not normal. Patient feels that they are improved due to a reduction in symptoms.        Objective:  The following was observed:    P: palpatory tenderness, piriformis R    A: static palpation demonstrates intersegmental asymmetry , thoracic, lumbar, pelvis    R: motion palpation notes restricted motion, :  T10 Extension restriction  T11 Extension restriction  T12 Extension restriction  L4 Right rotation restricted  L5 Right rotation restricted  PSIS Right Extension restriction    T: hypertonicity at: Piriformis R>>L      Assessment:    Segmental spinal dysfunction/restrictions found at:  T10  T11  T12  L4  L5  PSIS Right    Diagnoses:      1. Segmental dysfunction of pelvic region    2. Lumbago    3. Segmental dysfunction of lumbar region    4. Spasm of muscle    5. Thoracic segment dysfunction        Patient's condition:  Patient had restrictions pre-manipulation    Treatment effectiveness:  Post manipulation there is better intersegmental movement and Patient claims to feel looser post manipulation      Procedures:  CMT:  52592 Chiropractic manipulative treatment 3-4 regions performed   Thoracic: Diversified, T10, T11, T12, Prone  Lumbar:  Activator, L4, L5, Prone  Pelvis: Drop Table, PSIS Right , Prone    Modalities:  13018: MSTM:  To Piriformis  for 5 min    Therapeutic procedures:  none      Prognosis: Good    Progress towards Goals: Patient is making progress towards the goal     Response to Treatment:   Reduction in symptoms as reported by patient      Recommendations:    Instructions:ice 20 minutes every other hour as needed and stretch as instructed at visit    Follow-up:  Return to care in one week

## 2018-04-04 ENCOUNTER — THERAPY VISIT (OUTPATIENT)
Dept: CHIROPRACTIC MEDICINE | Facility: CLINIC | Age: 38
End: 2018-04-04
Payer: COMMERCIAL

## 2018-04-04 DIAGNOSIS — M99.02 THORACIC SEGMENT DYSFUNCTION: ICD-10-CM

## 2018-04-04 DIAGNOSIS — M54.50 LUMBAGO: ICD-10-CM

## 2018-04-04 DIAGNOSIS — M99.03 SEGMENTAL DYSFUNCTION OF LUMBAR REGION: ICD-10-CM

## 2018-04-04 DIAGNOSIS — M99.05 SEGMENTAL DYSFUNCTION OF PELVIC REGION: Primary | ICD-10-CM

## 2018-04-04 DIAGNOSIS — M62.838 SPASM OF MUSCLE: ICD-10-CM

## 2018-04-04 PROCEDURE — 98941 CHIROPRACT MANJ 3-4 REGIONS: CPT | Mod: AT | Performed by: CHIROPRACTOR

## 2018-04-04 NOTE — PROGRESS NOTES
Visit #:  7 of 8, based on treatment plan    Subjective:  Kesha Carter is a 37 year old female who is seen in f/u up for:        Segmental dysfunction of pelvic region  Lumbago  Segmental dysfunction of lumbar region  Spasm of muscle  Thoracic segment dysfunction.     Since last visit on 3/21/2018,  Kesha Carter reports the following changes: Pain immediately after last treatment: 4/10 and their pain level today 4/10.  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 She reports her back is the most painful in the morning after sleeping. Patient reports that she feels has weakness is one of the contributing factors. She reports she had about 3-4 days of relief after her last adjustment.    Patient reports that her back has been very tight and painful this past week.  Last weekend she had some food poisoning and she has been working on the opening of a new clinic.  So she has been under a lot of stress but those events have passed so she is working on relaxing and reducing some of her stress.    Area of chief complaint:  Lumbar :  Symptoms are graded at 4/10. The quality is described as stiff.  Motion has increased, but is still not normal. Patient feels that they are improved due to a reduction in symptoms.        Objective:  The following was observed:    P: palpatory tenderness, piriformis R    A: static palpation demonstrates intersegmental asymmetry , thoracic, lumbar, pelvis    R: motion palpation notes restricted motion, :  T10 Extension restriction  T11 Extension restriction  T12 Extension restriction  L4 Right rotation restricted  L5 Right rotation restricted  PSIS Right Extension restriction    T: hypertonicity at: Piriformis R>>L      Assessment:    Segmental spinal dysfunction/restrictions found at:  T10  T11  T12  L4  L5  PSIS Right    Diagnoses:      1. Segmental dysfunction of pelvic region    2. Lumbago    3. Segmental dysfunction of lumbar region     4. Spasm of muscle    5. Thoracic segment dysfunction        Patient's condition:  Patient had restrictions pre-manipulation    Treatment effectiveness:  Post manipulation there is better intersegmental movement and Patient claims to feel looser post manipulation      Procedures:  CMT:  04809 Chiropractic manipulative treatment 3-4 regions performed   Thoracic: Diversified, T10, T11, T12, Prone  Lumbar: Activator, L4, L5, Prone  Pelvis: Drop Table, PSIS Right , Prone    Modalities:  97027: MSTM:  To Piriformis  for 5 min    Therapeutic procedures:  none      Prognosis: Good    Progress towards Goals: Patient is making progress towards the goal     Response to Treatment:   Reduction in symptoms as reported by patient      Recommendations:    Instructions:ice 20 minutes every other hour as needed and stretch as instructed at visit    Follow-up:  Return to care in one week

## 2018-04-12 ENCOUNTER — TRANSFERRED RECORDS (OUTPATIENT)
Dept: HEALTH INFORMATION MANAGEMENT | Facility: CLINIC | Age: 38
End: 2018-04-12

## 2018-04-13 ENCOUNTER — TRANSFERRED RECORDS (OUTPATIENT)
Dept: HEALTH INFORMATION MANAGEMENT | Facility: CLINIC | Age: 38
End: 2018-04-13

## 2018-04-16 DIAGNOSIS — G43.839 MENSTRUAL MIGRAINE, INTRACTABLE, WITHOUT STATUS MIGRAINOSUS: ICD-10-CM

## 2018-04-17 RX ORDER — ZOLMITRIPTAN 5 MG/1
TABLET, FILM COATED ORAL
Qty: 6 TABLET | Refills: 0 | Status: SHIPPED | OUTPATIENT
Start: 2018-04-17 | End: 2018-10-19

## 2018-04-17 NOTE — TELEPHONE ENCOUNTER
Prescription approved per [facility] Refill Protocol.  Jarred March RN

## 2018-04-20 ENCOUNTER — TRANSFERRED RECORDS (OUTPATIENT)
Dept: HEALTH INFORMATION MANAGEMENT | Facility: CLINIC | Age: 38
End: 2018-04-20

## 2018-04-25 ENCOUNTER — TRANSFERRED RECORDS (OUTPATIENT)
Dept: HEALTH INFORMATION MANAGEMENT | Facility: CLINIC | Age: 38
End: 2018-04-25

## 2018-05-29 ENCOUNTER — TRANSFERRED RECORDS (OUTPATIENT)
Dept: HEALTH INFORMATION MANAGEMENT | Facility: CLINIC | Age: 38
End: 2018-05-29

## 2018-06-07 ENCOUNTER — TRANSFERRED RECORDS (OUTPATIENT)
Dept: HEALTH INFORMATION MANAGEMENT | Facility: CLINIC | Age: 38
End: 2018-06-07

## 2018-06-12 ENCOUNTER — TRANSFERRED RECORDS (OUTPATIENT)
Dept: HEALTH INFORMATION MANAGEMENT | Facility: CLINIC | Age: 38
End: 2018-06-12

## 2018-06-18 ENCOUNTER — TELEPHONE (OUTPATIENT)
Dept: FAMILY MEDICINE | Facility: CLINIC | Age: 38
End: 2018-06-18

## 2018-06-18 NOTE — TELEPHONE ENCOUNTER
Reason for Call:  Other appointment    Detailed comments: Kendra was seen by [facility] and she was told that her thyroid levels were off and were sent to Dr. Mar for review.  Calling today to see if Dr. Mar has viewed them yet and then said that some of her other labs were off also (?).  Wondering if she should make an appointment with Dr. Mar or have a referral done for rheumatologist?  Copy of labs from [facility] placed with RN.  Please call and assess. Thank you..Ghazal Arcos    Phone Number Patient can be reached at: Home number on file [phone] (home)    Best Time: any time    Can we leave a detailed message on this number? YES    Call taken on 6/18/2018 at 10:31 AM by Ghazal Arcos

## 2018-06-18 NOTE — TELEPHONE ENCOUNTER
Spoke with Kendra and appointment made to see Dr. Mar tomorrow on 6/19/18. Med records from [facility] placed at Dr. Mar's desk.  Jarred March RN

## 2018-06-19 ENCOUNTER — OFFICE VISIT (OUTPATIENT)
Dept: FAMILY MEDICINE | Facility: CLINIC | Age: 38
End: 2018-06-19
Payer: COMMERCIAL

## 2018-06-19 VITALS
HEART RATE: 56 BPM | SYSTOLIC BLOOD PRESSURE: 124 MMHG | WEIGHT: 149 LBS | BODY MASS INDEX: 23.95 KG/M2 | HEIGHT: 66 IN | DIASTOLIC BLOOD PRESSURE: 80 MMHG | TEMPERATURE: 98.3 F

## 2018-06-19 DIAGNOSIS — E03.4 HYPOTHYROIDISM DUE TO ACQUIRED ATROPHY OF THYROID: Primary | ICD-10-CM

## 2018-06-19 DIAGNOSIS — F31.9 BIPOLAR I DISORDER (H): ICD-10-CM

## 2018-06-19 DIAGNOSIS — R11.2 INTRACTABLE VOMITING WITH NAUSEA, UNSPECIFIED VOMITING TYPE: ICD-10-CM

## 2018-06-19 LAB
CRP SERPL-MCNC: <2.9 MG/L (ref 0–8)
ERYTHROCYTE [SEDIMENTATION RATE] IN BLOOD BY WESTERGREN METHOD: 9 MM/H (ref 0–20)
LITHIUM SERPL-SCNC: 0.71 MMOL/L (ref 0.6–1.2)
T4 FREE SERPL-MCNC: 1.3 NG/DL (ref 0.76–1.46)
TSH SERPL DL<=0.005 MIU/L-ACNC: 0.19 MU/L (ref 0.4–4)

## 2018-06-19 PROCEDURE — 86235 NUCLEAR ANTIGEN ANTIBODY: CPT | Performed by: FAMILY MEDICINE

## 2018-06-19 PROCEDURE — 80178 ASSAY OF LITHIUM: CPT | Performed by: FAMILY MEDICINE

## 2018-06-19 PROCEDURE — 86225 DNA ANTIBODY NATIVE: CPT | Performed by: FAMILY MEDICINE

## 2018-06-19 PROCEDURE — 85652 RBC SED RATE AUTOMATED: CPT | Performed by: FAMILY MEDICINE

## 2018-06-19 PROCEDURE — 99213 OFFICE O/P EST LOW 20 MIN: CPT | Performed by: FAMILY MEDICINE

## 2018-06-19 PROCEDURE — 84443 ASSAY THYROID STIM HORMONE: CPT | Performed by: FAMILY MEDICINE

## 2018-06-19 PROCEDURE — 36415 COLL VENOUS BLD VENIPUNCTURE: CPT | Performed by: FAMILY MEDICINE

## 2018-06-19 PROCEDURE — 86140 C-REACTIVE PROTEIN: CPT | Performed by: FAMILY MEDICINE

## 2018-06-19 PROCEDURE — 84439 ASSAY OF FREE THYROXINE: CPT | Performed by: FAMILY MEDICINE

## 2018-06-19 RX ORDER — PANTOPRAZOLE SODIUM 40 MG/1
TABLET, DELAYED RELEASE ORAL
Refills: 3 | COMMUNITY
Start: 2018-05-25 | End: 2018-06-25

## 2018-06-19 NOTE — PROGRESS NOTES
SUBJECTIVE:                                                    Kesha Carter is a 37 year old female who presents to clinic today for the following health issues:    Chief Complaint   Patient presents with     Results     Discuss lab results from [facility]       Problem list and histories reviewed & adjusted, as indicated.  Additional history: she had has episodes of nausea and vomiting for 3-4 days   This flares karen nd off not associated with menses something that I dn't have results of came back + for lupus I would guess puma . She doesn't have dysphagia. No joint swelling no skin changes no lung or renal symptoms .   Her symptoms started about 18 months ago she was worked up for the gall bladder and this did show dyskenesia and her severe pain went away when this was removed but since a few months after its removal she has been on this very restrictive diet. Or she has episodes of pain and vomiting . doesnot do illicit drugs   She did meet with the [facility] endo who completely dismissed her and told her everything was fine didn't listen to her other symptoms and sent her to weight management. She was insulted and I  While her thyroid is normal she did not feel that they even listened to her cyclic vomiting type symptoms at all. I recommended that she at least speak to another endo .         Patient Active Problem List   Diagnosis     FAMILY HISTORY OF ENDOCRINE DISEASE( other endo or metabol)     Irratability     CARDIOVASCULAR SCREENING; LDL GOAL LESS THAN 160     Adjustment disorder with anxiety     Bipolar disorder, current episode hypomanic (H)     Hypothyroidism     Past Surgical History:   Procedure Laterality Date     C REPAIR CRUCIATE LIGAMENT,KNEE      Left acl repair     C/SECTION, LOW TRANSVERSE      , Low Transverse     C/SECTION, LOW TRANSVERSE      , Low Transverse     LAPAROSCOPIC CHOLECYSTECTOMY N/A 2017    Procedure: LAPAROSCOPIC CHOLECYSTECTOMY;  Surgeon:  "Tani Brenner MD;  Location: [facility] OR     MAMMOPLASTY AUGMENTATION BILATERAL  07/2010    Implants.     TUBAL LIGATION  2009    With C/S       Social History   Substance Use Topics     Smoking status: Former Smoker     Types: Cigarettes     Quit date: 8/8/2016     Smokeless tobacco: Never Used     Alcohol use Yes      Comment: Once a month      Family History   Problem Relation Age of Onset     Thyroid Disease Mother      Arthritis Mother      Hypertension Mother      Thyroid Disease Maternal Grandmother      Hypertension Maternal Grandmother      Cardiovascular Maternal Grandfather      HEART DISEASE Maternal Grandfather      Hypertension Maternal Grandfather      Diabetes Mother      Hypertension Paternal Grandmother      Cerebrovascular Disease Paternal Grandfather      Anxiety Disorder Mother      Obesity Mother      Obesity Sister            ROS:  Constitutional, HEENT, cardiovascular, pulmonary, gi and gu systems are negative, except as otherwise noted.    OBJECTIVE:                                                    /80  Pulse 56  Temp 98.3  F (36.8  C) (Tympanic)  Ht 5' 6\" (1.676 m)  Wt 149 lb (67.6 kg)  BMI 24.05 kg/m2 Body mass index is 24.05 kg/(m^2).   GENERAL APPEARANCE: healthy, alert and no distress  NECK: no adenopathy, no asymmetry, masses, or scars and thyroid normal to palpation  RESP: lungs clear to auscultation - no rales, rhonchi or wheezes  CV: regular rates and rhythm, normal S1 S2, no S3 or S4 and no murmur, click or rub  ABDOMEN: soft, nontender, without hepatosplenomegaly or masses and bowel sounds normal       ASSESSMENT/PLAN:                                                    1. Hypothyroidism due to acquired atrophy of thyroid    - ENDOCRINOLOGY ADULT REFERRAL  - TSH  - T4, free    2. Bipolar I disorder (H)    - ENDOCRINOLOGY ADULT REFERRAL  - Lithium level    3. Intractable vomiting with nausea, unspecified vomiting type  She has been seeing gi and one of the labs came back " indicative of lupus I don't know which one but we can order a few more it may be worth at least a rheumatology consult. I would like to keep searching for a reason for her abnormal abdominal symptoms.   - CRP, inflammation  - ESR: Erythrocyte sedimentation rate  - [place] CORTES Antibody IgG  - DNA double stranded antibodies  - RHEUMATOLOGY REFERRAL     reports that she quit smoking about 22 months ago. Her smoking use included Cigarettes. She has never used smokeless tobacco.          Pia Mar M.D.  [facility]

## 2018-06-19 NOTE — MR AVS SNAPSHOT
After Visit Summary   6/19/2018    Kesha Carter    MRN: [id number]           Patient Information     Date Of Birth          1980        Visit Information        Provider Department      6/19/2018 2:30 PM Pia Mar MD [facility]        Today's Diagnoses     Hypothyroidism due to acquired atrophy of thyroid    -  1    Bipolar I disorder (H)        Intractable vomiting with nausea, unspecified vomiting type           Follow-ups after your visit        Additional Services     ENDOCRINOLOGY ADULT REFERRAL       Your provider has referred you to: [facility]: End[facility]ology Clinic [facility] [phone]   [email or web address]      Please be aware that coverage of these services is subject to the terms and limitations of your health insurance plan.  Call member services at your health plan with any benefit or coverage questions.      Please bring the following to your appointment:    >>   Any x-rays, CTs or MRIs which have been performed.  Contact the facility where they were done to arrange for  prior to your scheduled appointment.    >>   List of current medications   >>   This referral request   >>   Any documents/labs given to you for this referral            RHEUMATOLOGY REFERRAL       [place] provider [place] [facility] [facility] [phone]   [email or web address]     Please be aware that coverage of these services is subject to the terms and limitations of your health insurance plan.  Call member services at your health plan with any benefit or coverage questions.      Please bring the following with you to your appointment:    (1) Any X-Rays, CTs or MRIs which have been performed.  Contact the facility where they were done to arrange for  prior to your scheduled appointment.    (2) List of current medications   (3) This referral request   (4) Any documents/labs given to  "you for this referral                  Who to contact     Normal or non-critical lab and imaging results will be communicated to you by [employer], letter or phone within 4 business days after the clinic has received the results. If you do not hear from us within 7 days, please contact the clinic through [employer] or phone. If you have a critical or abnormal lab result, we will notify you by phone as soon as possible.  Submit refill requests through [employer] or call your pharmacy and they will forward the refill request to us. Please allow 3 business days for your refill to be completed.          If you need to speak with a  for additional information , please call: [phone]             Additional Information About Your Visit        [employer] Information     [employer] gives you secure access to your electronic health record. If you see a primary care provider, you can also send messages to your care team and make appointments. If you have questions, please call your primary care clinic.  If you do not have a primary care provider, please call [phone] and they will assist you.        Care EveryWhere ID     This is your Care EveryWhere ID. This could be used by other organizations to access your [facility] medical records  [id number]        Your Vitals Were     Pulse Temperature Height BMI (Body Mass Index)          56 98.3  F (36.8  C) (Tympanic) 5' 6\" (1.676 m) 24.05 kg/m2         Blood Pressure from Last 3 Encounters:   06/19/18 124/80   01/11/18 (!) 133/91   10/11/17 122/78    Weight from Last 3 Encounters:   06/19/18 149 lb (67.6 kg)   01/11/18 159 lb 4.8 oz (72.3 kg)   10/11/17 159 lb (72.1 kg)              We Performed the Following     CRP, inflammation     DNA double stranded antibodies     ENDOCRINOLOGY ADULT REFERRAL     ESR: Erythrocyte sedimentation rate     Lithium level     RHEUMATOLOGY REFERRAL     [place] CORTES Antibody IgG     T4, free     TSH        Primary Care Provider Office Phone # " Fax #    Pia Mar [phone] [address] [facility] [address]        Equal Access to Services     JAVON VARNER : Estefani osvaldo og aminta Navas, sue erniedelbert, aaliyah kaabel boo, nathan farris laramilabridgette emmy. So [facility] [phone].    ATENCIÓN: Si habla español, tiene a crain disposición servicios gratuitos de asistencia lingüística. Llame al [phone].    We comply with applicable federal civil rights laws and [place] laws. We do not discriminate on the basis of race, color, national origin, age, disability, sex, sexual orientation, or gender identity.            Thank you!     Thank you for choosing [facility]  for your care. Our goal is always to provide you with excellent care. Hearing back from our patients is one way we can continue to improve our services. Please take a few minutes to complete the written survey that you may receive in the mail after your visit with us. Thank you!             Your Updated Medication List - Protect others around you: Learn how to safely use, store and throw away your medicines at www.disposemymeds.org.          This list is accurate as of 6/19/18  3:46 PM.  Always use your most recent med list.                   Brand Name Dispense Instructions for use Diagnosis    ammonium lactate 12 % cream    LAC-HYDRIN    120 g    Apply topically 2 times daily    Keratosis pilaris       cholestyramine 4 GM/DOSE powder    QUESTRAN    540 g    Take 2 g by mouth 3 times daily (with meals) Take as directed to control GI symptoms    Irritable bowel syndrome with both constipation and diarrhea       clonazePAM 0.5 MG tablet    klonoPIN    20 tablet    Take 0.5-1 tablets (0.25-0.5 mg) by mouth nightly as needed for other (insomnia)    Persistent insomnia       levothyroxine 100 MCG tablet    SYNTHROID/LEVOTHROID    90 tablet    Take 1 tablet (100 mcg) by mouth daily    Acquired hypothyroidism       lithium 600 MG capsule       Take 600 one day 900 mg the next day on an alternating schedule        minocycline 100 MG capsule    MINOCIN/DYNACIN    180 capsule    Take 1 capsule (100 mg) by mouth 2 times daily    Acne vulgaris       MIRALAX powder   Generic drug:  polyethylene glycol      Take 1 capful by mouth daily        Multi-vitamin Tabs tablet     100 tablet    Take 1 tablet by mouth daily    Abdominal pain, epigastric, Nausea, Bipolar disorder, in full remission, most recent episode manic (H)       norgestimate-ethinyl estradiol 0.25-35 MG-MCG per tablet    ORTHO-CYCLEN, SPRINTEC    112 tablet    Take 1 active pill daily for 12 weeks skip placebo pills    Acne vulgaris, Irritability, Menstrual migraine, intractable, without status migrainosus       ondansetron 4 MG ODT tab    ZOFRAN ODT    20 tablet    Take 1-2 tablets (4-8 mg) by mouth 3 times daily (before meals)    Nausea       pantoprazole 40 MG EC tablet    PROTONIX     TK 1 T PO QD        PHAZYME MAXIMUM STRENGTH 250 MG Caps   Generic drug:  Simethicone      Take by mouth as needed        PROBIOTIC DAILY PO           spironolactone 50 MG tablet    ALDACTONE    90 tablet    TAKE 1 TABLET BY MOUTH DAILY    Acne vulgaris       temazepam 15 MG capsule    RESTORIL    30 capsule     Persistent disorder of initiating or maintaining sleep       ZOLMitriptan 5 MG tablet    ZOMIG    6 tablet    TAKE 1 TABLET(5 MG) BY MOUTH AT ONSET OF HEADACHE FOR MIGRAINE. MAY REPEAT IN 2 HOURS. MAX 2 TABLETS/ 24 HOURS    Menstrual migraine, intractable, without status migrainosus

## 2018-06-20 LAB — ENA SM IGG SER-ACNC: <0.2 AI (ref 0–0.9)

## 2018-06-21 ENCOUNTER — MYC MEDICAL ADVICE (OUTPATIENT)
Dept: FAMILY MEDICINE | Facility: CLINIC | Age: 38
End: 2018-06-21

## 2018-06-21 LAB — DSDNA AB SER-ACNC: <1 IU/ML

## 2018-07-17 ENCOUNTER — TRANSFERRED RECORDS (OUTPATIENT)
Dept: HEALTH INFORMATION MANAGEMENT | Facility: CLINIC | Age: 38
End: 2018-07-17

## 2018-07-23 ENCOUNTER — TELEPHONE (OUTPATIENT)
Dept: FAMILY MEDICINE | Facility: CLINIC | Age: 38
End: 2018-07-23

## 2018-07-23 ENCOUNTER — TRANSFERRED RECORDS (OUTPATIENT)
Dept: HEALTH INFORMATION MANAGEMENT | Facility: CLINIC | Age: 38
End: 2018-07-23

## 2018-07-23 DIAGNOSIS — T75.3XXA MOTION SICKNESS, INITIAL ENCOUNTER: Primary | ICD-10-CM

## 2018-07-23 RX ORDER — SCOLOPAMINE TRANSDERMAL SYSTEM 1 MG/1
PATCH, EXTENDED RELEASE TRANSDERMAL
Qty: 2 PATCH | Refills: 0 | Status: SHIPPED | OUTPATIENT
Start: 2018-07-23 | End: 2019-09-09

## 2018-07-23 NOTE — TELEPHONE ENCOUNTER
I will send in scopolamine patches . They are good for motion sickness but she cannot drive herself with them. They will make her tired. I don't know what to give her that won't make her tired. Pia Mar M.D.

## 2018-07-23 NOTE — TELEPHONE ENCOUNTER
I spoke with Kesha. She is still having extreme nausea even when taking  the Zofran especially when she rides in a car as a passenger.   Patient has two trips scheduled for her work that are long drives to  [facility]. She leaves next Monday for the first one and goes again two weeks later. She has asked if she can drive herself and was told she couldn't.    She has seen a Rheumatologist and had an MRI of her back. She has not yet scheduled with the Endocrinologist and will plan to do this. Wants to know if there is anything more that she can do that would help her tolerate riding with her nausea.     Patient quite anxious  about the outcome of this car ride. Is there anything else that she could take? Bárbara Dumas RN

## 2018-07-23 NOTE — TELEPHONE ENCOUNTER
I spoke to Kendra and let her know a prescription for patches was sent to her pharmacy. She will give these a try. Bárbara Dumas RN

## 2018-07-23 NOTE — TELEPHONE ENCOUNTER
Reason for call:  Patient reporting a symptom    Symptom or request: Kesha has been using ondansetron to help with nausea on last car trips, but it no longer seems to be working.  Looking for something else if possible.  Please call and assess. Thank you..Ghazal Arcos    Duration (how long have symptoms been present): unknown    Have you been treated for this before? Yes    Phone Number patient can be reached at:  Home number on file [phone] (home)    Best Time:  Any time    Can we leave a detailed message on this number:  YES    Call taken on 7/23/2018 at 10:44 AM by Ghazal Arcos

## 2018-08-08 ENCOUNTER — VIRTUAL VISIT (OUTPATIENT)
Dept: FAMILY MEDICINE | Facility: OTHER | Age: 38
End: 2018-08-08

## 2018-08-09 NOTE — PROGRESS NOTES
"Date:   Clinician: Joanne Tadeo  Clinician NPI: [id number]  Patient: Kesha Carter  Patient : 1980  Patient Address: [address] [place] [address]  Patient Phone: [phone]  Visit Protocol: Yeast infection  Patient Summary:  Kesha is a 37 year old ( : 1980 ) female who initiated a Visit for a presumed vaginal yeast infection. When asked the question \"Please sign me up to receive news, health information and promotions. \", Kesha responded \"No\".    0-5 days ago, she began noticing perivulvar pruritus and vaginal pruritus.   She denies having open sores, perivulvar rash, vaginal discharge, and abdominal pain. She also denies feeling feverish.   Kesha has a history of vaginal yeast infections. She has had two (2) occurrences in the past year and the current symptoms are similar to previous yeast infections. She has used fluconazole (Diflucan) to treat previous yeast infections and typically needs 2 doses to resolve them.   She has not tried to treat her current symptoms with any medication.   She denies taking antibiotics in the past 2 weeks. She prefers a fluconazole (Diflucan) Pill.   She denies pregnancy and denies breastfeeding. She has menstruated in the past month.   She denies risk factors for sexually transmitted infections. She does NOT smoke or use smokeless tobacco.   Additional information as reported by the patient (free text): Similar symptom,  frequent urination feeling    MEDICATIONS: Ortho-Cyclen (28) oral, levothyroxine oral, lithium carbonate oral, spironolactone oral, ALLERGIES: Erythrocin  Clinician Response:  Dear Kesha,  Based on the information you have provided, you likely have a vaginal yeast infection which is a common infection of the vagina caused by a fungus.  I am prescribing:   Fluconazole (Diflucan) 150 mg oral tablet. Take 1 tablet by mouth in a single dose. Repeat dose in 3 days if symptoms are still present. There are no " refills with this prescription.  While you have yeast infection symptoms, do the following:      Avoid irritants such as scented bath products, tampons, pads, or vaginal sprays and powders.    Avoid douching.    Wear cotton underwear and if you are comfortable doing so, do not wear underwear to bed.    Avoid hot tubs and whirlpool spas.     Symptoms should improve with 1-2 days of treatment and resolve entirely in 5-7 days. However, there are other types of vaginal infections that have some of the same symptoms as a yeast infection. For this reason, please be seen in person if symptoms have not improved after 3 days or resolved after 10 days.   Diagnosis: Candida Vulvovaginitis  Diagnosis ICD: B37.3  Prescription: fluconazole (Diflucan) 150 mg oral tablet 2 tablet, 4 days supply. Take 1 tablet by mouth in a single dose, repeat dose in 3 days if symptoms are still present. Refills: 0, Refill as needed: no, Allow substitutions: yes  Pharmacy: [facility] - [phone] - [address] [place] [address]

## 2018-10-11 DIAGNOSIS — G43.839 MENSTRUAL MIGRAINE, INTRACTABLE, WITHOUT STATUS MIGRAINOSUS: ICD-10-CM

## 2018-10-11 DIAGNOSIS — L70.0 ACNE VULGARIS: ICD-10-CM

## 2018-10-11 DIAGNOSIS — E03.9 ACQUIRED HYPOTHYROIDISM: ICD-10-CM

## 2018-10-11 DIAGNOSIS — R45.4 IRRITABILITY: ICD-10-CM

## 2018-10-12 NOTE — TELEPHONE ENCOUNTER
"Requested Prescriptions   Pending Prescriptions Disp Refills     MONONESSA 0.25-35 MG-MCG per tablet [Pharmacy Med Name: MONONESSA TABLETS 28S] 112 tablet 0    Last Written Prescription Date:  10/11/2017 #112 x 4  Last filled 06/25/2018  Last office visit: 6/19/2018 [facility] Office Visit:  None   Sig: TAKE 1 ACTIVE PILL DAILY FOR 12 WEEKS, SKIP PLACEBO PILLS    Contraceptives Protocol Passed    10/11/2018 10:05 PM       Passed - Patient is not a current smoker if age is 35 or older       Passed - Recent (12 mo) or future (30 days) visit within the authorizing provider's specialty    Patient had office visit in the last 12 months or has a visit in the next 30 days with authorizing provider or within the authorizing provider's specialty.  See \"Patient Info\" tab in inbasket, or \"Choose Columns\" in Meds & Orders section of the refill encounter.           Passed - No active pregnancy on record       Passed - No positive pregnancy test in past 12 months        levothyroxine (SYNTHROID/LEVOTHROID) 100 MCG tablet [Pharmacy Med Name: LEVOTHYROXINE 0.100MG (100MCG) TAB] 90 tablet 0    Last Written Prescription Date:  10/11/2017  #90 x 3  Last filled 06/25/2018  Last office visit: 6/19/2018 [facility] Office Visit: None   Sig: TAKE 1 TABLET(100 MCG) BY MOUTH DAILY    Thyroid Protocol Failed    10/11/2018 10:05 PM       Failed - Normal TSH on file in past 12 months    Recent Labs   Lab Test  06/19/18   1549   TSH  0.19*             Passed - Patient is 12 years or older       Passed - Recent (12 mo) or future (30 days) visit within the authorizing provider's specialty    Patient had office visit in the last 12 months or has a visit in the next 30 days with authorizing provider or within the authorizing provider's specialty.  See \"Patient Info\" tab in inbasket, or \"Choose Columns\" in Meds & Orders section of the refill encounter.           Passed - No active pregnancy on record    If patient is pregnant or " "has had a positive pregnancy test, please check TSH.         Passed - No positive pregnancy test in past 12 months    If patient is pregnant or has had a positive pregnancy test, please check TSH.          spironolactone (ALDACTONE) 50 MG tablet [Pharmacy Med Name: SPIRONOLACTONE 50MG TABLETS] 90 tablet 0    Last Written Prescription Date:  08/28/2017 #90 x 3  Last filled 06/25/2018  Last office visit: 6/19/2018 [facility]   Future Office Visit: None    Sig: TAKE 1 TABLET BY MOUTH DAILY    Diuretics (Including Combos) Protocol Passed    10/11/2018 10:05 PM       Passed - Blood pressure under 140/90 in past 12 months    BP Readings from Last 3 Encounters:   06/19/18 124/80   01/11/18 (!) 133/91   10/11/17 122/78                Passed - Recent (12 mo) or future (30 days) visit within the authorizing provider's specialty    Patient had office visit in the last 12 months or has a visit in the next 30 days with authorizing provider or within the authorizing provider's specialty.  See \"Patient Info\" tab in inbasket, or \"Choose Columns\" in Meds & Orders section of the refill encounter.           Passed - Patient is age 18 or older       Passed - No active pregancy on record       Passed - Normal serum creatinine on file in past 12 months    Recent Labs   Lab Test  10/11/17   0830   CR  0.87             Passed - Normal serum potassium on file in past 12 months    Recent Labs   Lab Test  10/11/17   0830   POTASSIUM  4.8                   Passed - Normal serum sodium on file in past 12 months    Recent Labs   Lab Test  10/11/17   0830   NA  137             Passed - No positive pregnancy test in past 12 months          "

## 2018-10-15 ENCOUNTER — MYC MEDICAL ADVICE (OUTPATIENT)
Dept: FAMILY MEDICINE | Facility: CLINIC | Age: 38
End: 2018-10-15

## 2018-10-15 RX ORDER — LEVOTHYROXINE SODIUM 100 UG/1
TABLET ORAL
Qty: 90 TABLET | Refills: 0 | Status: SHIPPED | OUTPATIENT
Start: 2018-10-15 | End: 2019-04-10

## 2018-10-15 RX ORDER — SPIRONOLACTONE 50 MG/1
TABLET, FILM COATED ORAL
Qty: 90 TABLET | Refills: 0 | Status: SHIPPED | OUTPATIENT
Start: 2018-10-15 | End: 2019-01-10

## 2018-10-15 RX ORDER — NORGESTIMATE AND ETHINYL ESTRADIOL
KIT
Qty: 112 TABLET | Refills: 0 | Status: SHIPPED | OUTPATIENT
Start: 2018-10-15 | End: 2019-02-03

## 2018-10-15 NOTE — TELEPHONE ENCOUNTER
Routing refill request to provider for review/approval because:  Labs out of range:  See below    Neyda Uribe RN

## 2018-10-19 DIAGNOSIS — G43.839 MENSTRUAL MIGRAINE, INTRACTABLE, WITHOUT STATUS MIGRAINOSUS: ICD-10-CM

## 2018-10-19 RX ORDER — ZOLMITRIPTAN 5 MG/1
TABLET, FILM COATED ORAL
Qty: 6 TABLET | Refills: 1 | Status: SHIPPED | OUTPATIENT
Start: 2018-10-19 | End: 2021-10-05

## 2018-10-19 NOTE — TELEPHONE ENCOUNTER
"Requested Prescriptions   Pending Prescriptions Disp Refills     ZOLMitriptan (ZOMIG) 5 MG tablet [Pharmacy Med Name: ZOLMITRIPTAN 5MG TABLETS] 6 tablet 0    Last Written Prescription Date:  4/17/18  Last Fill Quantity: 6,  # refills: 0   Last office visit: 6/19/2018 with prescribing provider:  jose   Future Office Visit:     Sig: TAKE 1 TABLET(5 MG) BY MOUTH AT ONSET OF HEADACHE FOR MIGRAINE. MAY REPEAT IN 2 HOURS. MAX 2 TABLETS/ 24 HOURS    Serotonin Agonists Failed    10/19/2018  7:59 AM       Failed - Serotonin Agonist request needs review.    Please review patient's record. If patient has had 8 or more treatments in the past month, please forward to provider.         Passed - Blood pressure under 140/90 in past 12 months    BP Readings from Last 3 Encounters:   06/19/18 124/80   01/11/18 (!) 133/91   10/11/17 122/78                Passed - Recent (12 mo) or future (30 days) visit within the authorizing provider's specialty    Patient had office visit in the last 12 months or has a visit in the next 30 days with authorizing provider or within the authorizing provider's specialty.  See \"Patient Info\" tab in inbasket, or \"Choose Columns\" in Meds & Orders section of the refill encounter.             Passed - Patient is age 18 or older       Passed - No active pregnancy on record       Passed - No positive pregnancy test in past 12 months          "

## 2018-11-06 DIAGNOSIS — F31.76 DEPRESSED BIPOLAR I DISORDER IN REMISSION (H): Primary | ICD-10-CM

## 2018-11-06 LAB
CREAT SERPL-MCNC: 0.84 MG/DL (ref 0.52–1.04)
GFR SERPL CREATININE-BSD FRML MDRD: 75 ML/MIN/1.7M2
LITHIUM SERPL-SCNC: 0.43 MMOL/L (ref 0.6–1.2)
T4 FREE SERPL-MCNC: 1.13 NG/DL (ref 0.76–1.46)
TSH SERPL DL<=0.005 MIU/L-ACNC: 2.15 MU/L (ref 0.4–4)

## 2018-11-06 PROCEDURE — 80178 ASSAY OF LITHIUM: CPT | Performed by: PSYCHIATRY & NEUROLOGY

## 2018-11-06 PROCEDURE — 36415 COLL VENOUS BLD VENIPUNCTURE: CPT | Performed by: PSYCHIATRY & NEUROLOGY

## 2018-11-06 PROCEDURE — 84443 ASSAY THYROID STIM HORMONE: CPT | Performed by: PSYCHIATRY & NEUROLOGY

## 2018-11-06 PROCEDURE — 82565 ASSAY OF CREATININE: CPT | Performed by: PSYCHIATRY & NEUROLOGY

## 2018-11-06 PROCEDURE — 84439 ASSAY OF FREE THYROXINE: CPT | Performed by: PSYCHIATRY & NEUROLOGY

## 2018-12-28 ENCOUNTER — VIRTUAL VISIT (OUTPATIENT)
Dept: FAMILY MEDICINE | Facility: OTHER | Age: 38
End: 2018-12-28

## 2018-12-29 NOTE — PROGRESS NOTES
"Date:   Clinician: Carmen Pcaheco  Clinician NPI: [id number]  Patient: Kesha Carter  Patient : 1980  Patient Address: [address] [place] [address]  Patient Phone: [phone]  Visit Protocol: Yeast infection  Patient Summary:  Kesha is a 38 year old ( : 1980 ) female who initiated a Visit for a presumed vaginal yeast infection. When asked the question \"Please sign me up to receive news, health information and promotions. \", Kesha responded \"No\".    Kesha began noticing abdominal pain, vaginal discharge, vaginal pruritus, and perivulvar pruritus 0-5 days ago. Her abdominal pain is mild (1-3 on a 10 point pain scale). She has a more than normal amount of thick, clear or white, malodorous, chunky (like cottage cheese) discharge.   She denies having perivulvar rash and open sores. She also denies feeling feverish.   Kesha has a history of vaginal yeast infections. She has had two (2) occurrences in the past year and the current symptoms are similar to previous yeast infections. She has used fluconazole (Diflucan) to treat previous yeast infections. 2 doses of fluconazole (Diflucan) has typically been needed for symptoms to resolve in the past.  She has not tried to treat her current symptoms with any medication.   She prefers a fluconazole (Diflucan) Pill.   She denies taking antibiotics in the past 2 weeks. She denies having a sexually transmitted disease.   She denies pregnancy and denies breastfeeding. She has menstruated in the past month.   She does NOT smoke or use smokeless tobacco.   Additional information as reported by the patient (free text): I am experiencing typical symptoms that happen after I go camping or ice fishing where I have no running water.   I have used 2 rounds with fluconazole with success.     MEDICATIONS: Ortho-Cyclen (28) oral, levothyroxine oral, lithium carbonate oral, spironolactone oral, ALLERGIES: Erythrocin  Clinician " Response:  Dear Kesha,  Based on the information you have provided, you likely have a vaginal yeast infection which is a common infection of the vagina caused by a fungus.  I am prescribing:   Fluconazole (Diflucan) 150 mg oral tablet. Take 1 tablet by mouth in a single dose. Repeat dose in 3 days if symptoms are still present. There are no refills with this prescription.  While you have yeast infection symptoms, do the following:      Avoid irritants such as scented bath products, tampons, pads, or vaginal sprays and powders.    Avoid douching.    Wear cotton underwear and if you are comfortable doing so, do not wear underwear to bed.    Avoid hot tubs and whirlpool spas.     Symptoms should improve with 1-2 days of treatment and resolve entirely in 5-7 days. However, there are other types of vaginal infections that have some of the same symptoms as a yeast infection. For this reason, please be seen in person if symptoms have not improved after 3 days or resolved after 10 days.   Diagnosis: Candida vulvovaginitis  Diagnosis ICD: B37.3  Prescription: fluconazole (Diflucan) 150 mg oral tablet 2 tablet, 4 days supply. Take 1 tablet by mouth in a single dose, repeat dose in 3 days if symptoms are still present. Refills: 0, Refill as needed: no, Allow substitutions: yes  Pharmacy: [id number] - [phone] - [address], [place] [address]

## 2019-01-10 DIAGNOSIS — L70.0 ACNE VULGARIS: ICD-10-CM

## 2019-01-11 NOTE — TELEPHONE ENCOUNTER
"Requested Prescriptions   Pending Prescriptions Disp Refills     spironolactone (ALDACTONE) 50 MG tablet [Pharmacy Med Name: SPIRONOLACTONE 50MG TABLETS] 90 tablet 0     Sig: TAKE 1 TABLET BY MOUTH DAILY    Diuretics (Including Combos) Protocol Failed - 1/10/2019  4:50 PM       Failed - Normal serum potassium on file in past 12 months    Recent Labs   Lab Test 10/11/17  0830   POTASSIUM 4.8                   Failed - Normal serum sodium on file in past 12 months    Recent Labs   Lab Test 10/11/17  0830                Passed - Blood pressure under 140/90 in past 12 months    BP Readings from Last 3 Encounters:   06/19/18 124/80   01/11/18 (!) 133/91   10/11/17 122/78                Passed - Recent (12 mo) or future (30 days) visit within the authorizing provider's specialty    Patient had office visit in the last 12 months or has a visit in the next 30 days with authorizing provider or within the authorizing provider's specialty.  See \"Patient Info\" tab in inbasket, or \"Choose Columns\" in Meds & Orders section of the refill encounter.             Passed - Medication is active on med list       Passed - Patient is age 18 or older       Passed - No active pregancy on record       Passed - Normal serum creatinine on file in past 12 months    Recent Labs   Lab Test 11/06/18  0805   CR 0.84             Passed - No positive pregnancy test in past 12 months        Last Written Prescription Date:  10/15/18  Last Fill Quantity: 90,  # refills: 0   Last office visit: 6/19/2018 with prescribing provider:  Pia aMr     Future Office Visit:      "

## 2019-01-11 NOTE — TELEPHONE ENCOUNTER
Routing refill request to provider for review/approval because:  Labs not current:    Darline De La Garza RN

## 2019-01-15 RX ORDER — SPIRONOLACTONE 50 MG/1
TABLET, FILM COATED ORAL
Qty: 90 TABLET | Refills: 0 | Status: SHIPPED | OUTPATIENT
Start: 2019-01-15 | End: 2019-04-10

## 2019-01-15 NOTE — TELEPHONE ENCOUNTER
Kendra needs a bmp she has not had one in a while and it is a requirement to stay on the aldactone. I will put in the order. Pia Mar M.D.'

## 2019-02-03 DIAGNOSIS — G43.839 MENSTRUAL MIGRAINE, INTRACTABLE, WITHOUT STATUS MIGRAINOSUS: ICD-10-CM

## 2019-02-03 DIAGNOSIS — L70.0 ACNE VULGARIS: ICD-10-CM

## 2019-02-03 DIAGNOSIS — R45.4 IRRITABILITY: ICD-10-CM

## 2019-02-05 RX ORDER — NORGESTIMATE AND ETHINYL ESTRADIOL
KIT
Qty: 112 TABLET | Refills: 0 | Status: SHIPPED | OUTPATIENT
Start: 2019-02-05 | End: 2019-05-13

## 2019-03-13 ENCOUNTER — OFFICE VISIT (OUTPATIENT)
Dept: FAMILY MEDICINE | Facility: CLINIC | Age: 39
End: 2019-03-13
Payer: COMMERCIAL

## 2019-03-13 VITALS
DIASTOLIC BLOOD PRESSURE: 80 MMHG | BODY MASS INDEX: 22.5 KG/M2 | SYSTOLIC BLOOD PRESSURE: 120 MMHG | TEMPERATURE: 99 F | OXYGEN SATURATION: 98 % | WEIGHT: 140 LBS | HEART RATE: 79 BPM | HEIGHT: 66 IN

## 2019-03-13 DIAGNOSIS — R07.0 THROAT PAIN: Primary | ICD-10-CM

## 2019-03-13 LAB
DEPRECATED S PYO AG THROAT QL EIA: NORMAL
SPECIMEN SOURCE: NORMAL

## 2019-03-13 PROCEDURE — 99213 OFFICE O/P EST LOW 20 MIN: CPT | Performed by: NURSE PRACTITIONER

## 2019-03-13 PROCEDURE — 87081 CULTURE SCREEN ONLY: CPT | Performed by: NURSE PRACTITIONER

## 2019-03-13 PROCEDURE — 87880 STREP A ASSAY W/OPTIC: CPT | Performed by: NURSE PRACTITIONER

## 2019-03-13 ASSESSMENT — MIFFLIN-ST. JEOR: SCORE: 1321.79

## 2019-03-13 NOTE — TELEPHONE ENCOUNTER
LEFT MESSAGE on personal phone of need for BMP.  Gave number to call back to schedule lab only appointment...Ghazal Arcos

## 2019-03-13 NOTE — PROGRESS NOTES
SUBJECTIVE:   Kesha Carter is a 38 year old female who presents to clinic today for the following health issues:    ENT Symptoms             Symptoms: cc Present Absent Comment   Fever/Chills  x  chills   Fatigue  x     Muscle Aches  x     Eye Irritation  x     Sneezing  x     Nasal Dov/Drg  x     Sinus Pressure/Pain  x     Loss of smell  x     Dental pain   x    Sore Throat  x     Swollen Glands  x     Ear Pain/Fullness  x     Cough   x    Wheeze   x    Chest Pain  x     Shortness of breath   x    Rash   x    Other  x  Nausea, vomitting this AM, diarrhea     Symptom duration:  2 days    Symptom severity:  moderate   Treatments tried:  none   Contacts:  Daughter diagnosed with Strep today      Decrease in appetite but drinking fluids.    Problem list and histories reviewed & adjusted, as indicated.  Additional history: as documented    Patient Active Problem List   Diagnosis     FAMILY HISTORY OF ENDOCRINE DISEASE( other endo or metabol)     Irratability     CARDIOVASCULAR SCREENING; LDL GOAL LESS THAN 160     Adjustment disorder with anxiety     Bipolar disorder, current episode hypomanic (H)     Hypothyroidism     Past Surgical History:   Procedure Laterality Date     C REPAIR CRUCIATE LIGAMENT,KNEE      Left acl repair     C/SECTION, LOW TRANSVERSE      , Low Transverse     C/SECTION, LOW TRANSVERSE      , Low Transverse     LAPAROSCOPIC CHOLECYSTECTOMY N/A 2017    Procedure: LAPAROSCOPIC CHOLECYSTECTOMY;  Surgeon: Tani Brenner MD;  Location: [facility]     MAMMOPLASTY AUGMENTATION BILATERAL  2010    Implants.     TUBAL LIGATION      With C/S       Social History     Tobacco Use     Smoking status: Former Smoker     Types: Cigarettes     Last attempt to quit: 2016     Years since quittin.5     Smokeless tobacco: Never Used   Substance Use Topics     Alcohol use: Yes     Comment: Once a month      Family History   Problem Relation Age of Onset     Thyroid  Disease Mother      Arthritis Mother      Hypertension Mother      Diabetes Mother      Anxiety Disorder Mother      Obesity Mother      Thyroid Disease Maternal Grandmother      Hypertension Maternal Grandmother      Cardiovascular Maternal Grandfather      Heart Disease Maternal Grandfather      Hypertension Maternal Grandfather      Hypertension Paternal Grandmother      Cerebrovascular Disease Paternal Grandfather      Obesity Sister          Current Outpatient Medications   Medication Sig Dispense Refill     ammonium lactate (LAC-HYDRIN) 12 % cream Apply topically 2 times daily 120 g 11     cholestyramine (QUESTRAN) 4 GM/DOSE powder Take 2 g by mouth 3 times daily (with meals) Take as directed to control GI symptoms 540 g 3     clonazePAM (KLONOPIN) 0.5 MG tablet Take 0.5-1 tablets (0.25-0.5 mg) by mouth nightly as needed for other (insomnia) 20 tablet 0     levothyroxine (SYNTHROID/LEVOTHROID) 100 MCG tablet TAKE 1 TABLET(100 MCG) BY MOUTH DAILY 90 tablet 0     lithium 600 MG capsule Take 600 one day 900 mg the next day on an alternating schedule       minocycline (MINOCIN/DYNACIN) 100 MG capsule Take 1 capsule (100 mg) by mouth 2 times daily 180 capsule 3     MONONESSA 0.25-35 MG-MCG tablet TAKE 1 ACTIVE PILL DAILY FOR 12 WEEKS, SKIP PLACEBO PILLS 112 tablet 0     multivitamin, therapeutic with minerals (MULTI-VITAMIN) TABS tablet Take 1 tablet by mouth daily 100 tablet 3     ondansetron (ZOFRAN ODT) 4 MG ODT tab Take 1-2 tablets (4-8 mg) by mouth 3 times daily (before meals) 20 tablet 1     polyethylene glycol (MIRALAX) powder Take 1 capful by mouth daily       Probiotic Product (PROBIOTIC DAILY PO)        spironolactone (ALDACTONE) 50 MG tablet TAKE 1 TABLET BY MOUTH DAILY 90 tablet 0     scopolamine (TRANSDERM) 72 hr patch Apply 1 patch to hairless area behind one ear at least 4 hours before travel.  Remove old patch and change every 3 days (72 hours). (Patient not taking: Reported on 3/13/2019) 2 patch 0  "    Simethicone (PHAZYME MAXIMUM STRENGTH) 250 MG CAPS Take by mouth as needed       temazepam (RESTORIL) 15 MG capsule  30 capsule      ZOLMitriptan (ZOMIG) 5 MG tablet TAKE 1 TABLET(5 MG) BY MOUTH AT ONSET OF HEADACHE FOR MIGRAINE. MAY REPEAT IN 2 HOURS. MAX 2 TABLETS/ 24 HOURS (Patient not taking: Reported on 3/13/2019) 6 tablet 1     Allergies   Allergen Reactions     Erythromycin Nausea and Vomiting       Reviewed and updated as needed this visit by clinical staff  Tobacco  Allergies  Meds  Problems  Med Hx  Surg Hx  Fam Hx  Soc Hx        Reviewed and updated as needed this visit by Provider  Tobacco  Allergies  Meds  Problems  Med Hx  Surg Hx  Fam Hx         ROS:  CONSTITUTIONAL:POSITIVE  for chills, fatigue and myalgias  INTEGUMENTARY/SKIN: NEGATIVE for worrisome rashes, moles or lesions  ENT/MOUTH: POSITIVE for nasal congestion, sinus pressure and sore throat  RESP:POSITIVE for cough-non productive  CV: NEGATIVE for chest pain, palpitations or peripheral edema  GI: POSITIVE for diarrhea, poor appetite and vomiting  PSYCHIATRIC: NEGATIVE for changes in mood or affect  ROS otherwise negative    OBJECTIVE:     /80   Pulse 79   Temp 99  F (37.2  C) (Tympanic)   Ht 1.676 m (5' 6\")   Wt 63.5 kg (140 lb)   SpO2 98%   BMI 22.60 kg/m    Body mass index is 22.6 kg/m .  GENERAL: healthy, alert and no distress  EYES: Eyes grossly normal to inspection, PERRL and conjunctivae and sclerae normal  HENT: ear canals and TM's normal, nasal membranes are edematous, peripheral oropharyngeal erythema with no enlargement of tonsils or exudate.  NECK: no adenopathy and no asymmetry, masses, or scars  RESP: lungs clear to auscultation - no rales, rhonchi or wheezes  CV: regular rate and rhythm, normal S1 S2, no S3 or S4, no murmur, click or rub, no peripheral edema and peripheral pulses strong  PSYCH: mentation appears normal, affect normal/bright    Diagnostic Test Results:  Results for orders placed or " performed in visit on 03/13/19 (from the past 24 hour(s))   Strep, Rapid Screen   Result Value Ref Range    Specimen Description Throat     Rapid Strep A Screen       NEGATIVE: No Group A streptococcal antigen detected by immunoassay, await culture report.       ASSESSMENT/PLAN:     1. Throat pain  Rapid strep negative.  Most likely viral.  Culture is pending.  Continue conservative measures for symptoms.  I will call tomorrow if culture is positive for treatment.  Follow-up in clinic if any persistent or worsening symptoms.  - Strep, Rapid Screen  - Beta strep group A culture    See Patient Instructions    Angy Holman NP  [facility]

## 2019-03-14 LAB
BACTERIA SPEC CULT: NORMAL
SPECIMEN SOURCE: NORMAL

## 2019-03-26 DIAGNOSIS — L70.0 ACNE VULGARIS: ICD-10-CM

## 2019-03-26 LAB
ANION GAP SERPL CALCULATED.3IONS-SCNC: 5 MMOL/L (ref 3–14)
BUN SERPL-MCNC: 20 MG/DL (ref 7–30)
CALCIUM SERPL-MCNC: 9.1 MG/DL (ref 8.5–10.1)
CHLORIDE SERPL-SCNC: 103 MMOL/L (ref 94–109)
CO2 SERPL-SCNC: 26 MMOL/L (ref 20–32)
CREAT SERPL-MCNC: 0.87 MG/DL (ref 0.52–1.04)
GFR SERPL CREATININE-BSD FRML MDRD: 83 ML/MIN/{1.73_M2}
GLUCOSE SERPL-MCNC: 84 MG/DL (ref 70–99)
POTASSIUM SERPL-SCNC: 4.5 MMOL/L (ref 3.4–5.3)
SODIUM SERPL-SCNC: 134 MMOL/L (ref 133–144)

## 2019-03-26 PROCEDURE — 80048 BASIC METABOLIC PNL TOTAL CA: CPT | Performed by: FAMILY MEDICINE

## 2019-03-26 PROCEDURE — 36415 COLL VENOUS BLD VENIPUNCTURE: CPT | Performed by: FAMILY MEDICINE

## 2019-04-10 DIAGNOSIS — E03.9 ACQUIRED HYPOTHYROIDISM: ICD-10-CM

## 2019-04-10 DIAGNOSIS — L70.0 ACNE VULGARIS: ICD-10-CM

## 2019-04-10 RX ORDER — SPIRONOLACTONE 50 MG/1
TABLET, FILM COATED ORAL
Qty: 90 TABLET | Refills: 0 | Status: SHIPPED | OUTPATIENT
Start: 2019-04-10 | End: 2019-04-30

## 2019-04-10 RX ORDER — LEVOTHYROXINE SODIUM 100 UG/1
TABLET ORAL
Qty: 90 TABLET | Refills: 0 | Status: SHIPPED | OUTPATIENT
Start: 2019-04-10 | End: 2019-07-19

## 2019-04-10 NOTE — TELEPHONE ENCOUNTER
"SPIRONOLACTONE 50MG TABLETS      Last Written Prescription Date:  1/15/19  Last Fill Quantity: 90,   # refills: 0  Last Office Visit: 3/13/19  Future Office visit:       levothyroxine (SYNTHROID/LEVOTHROID) 100 MCG tablet      Last Written Prescription Date:  10/15/18  Last Fill Quantity: 90,   # refills: 0  Last Office Visit: 3/13/19  Future Office visit:       Requested Prescriptions   Pending Prescriptions Disp Refills     spironolactone (ALDACTONE) 50 MG tablet [Pharmacy Med Name: SPIRONOLACTONE 50MG TABLETS] 90 tablet 0     Sig: TAKE 1 TABLET BY MOUTH DAILY       Diuretics (Including Combos) Protocol Passed - 4/10/2019 10:00 AM        Passed - Blood pressure under 140/90 in past 12 months     BP Readings from Last 3 Encounters:   03/13/19 120/80   06/19/18 124/80   01/11/18 (!) 133/91                 Passed - Recent (12 mo) or future (30 days) visit within the authorizing provider's specialty     Patient had office visit in the last 12 months or has a visit in the next 30 days with authorizing provider or within the authorizing provider's specialty.  See \"Patient Info\" tab in inbasket, or \"Choose Columns\" in Meds & Orders section of the refill encounter.              Passed - Medication is active on med list        Passed - Patient is age 18 or older        Passed - No active pregancy on record        Passed - Normal serum creatinine on file in past 12 months     Recent Labs   Lab Test 03/26/19  0744   CR 0.87              Passed - Normal serum potassium on file in past 12 months     Recent Labs   Lab Test 03/26/19  0744   POTASSIUM 4.5                    Passed - Normal serum sodium on file in past 12 months     Recent Labs   Lab Test 03/26/19  0744                 Passed - No positive pregnancy test in past 12 months        levothyroxine (SYNTHROID/LEVOTHROID) 100 MCG tablet [Pharmacy Med Name: LEVOTHYROXINE 0.100MG (100MCG) TAB] 90 tablet 0     Sig: TAKE 1 TABLET(100 MCG) BY MOUTH DAILY       Thyroid " "Protocol Passed - 4/10/2019 10:00 AM        Passed - Patient is 12 years or older        Passed - Recent (12 mo) or future (30 days) visit within the authorizing provider's specialty     Patient had office visit in the last 12 months or has a visit in the next 30 days with authorizing provider or within the authorizing provider's specialty.  See \"Patient Info\" tab in inbasket, or \"Choose Columns\" in Meds & Orders section of the refill encounter.              Passed - Medication is active on med list        Passed - Normal TSH on file in past 12 months     Recent Labs   Lab Test 11/06/18  0805   TSH 2.15              Passed - No active pregnancy on record     If patient is pregnant or has had a positive pregnancy test, please check TSH.          Passed - No positive pregnancy test in past 12 months     If patient is pregnant or has had a positive pregnancy test, please check TSH.            "

## 2019-04-29 DIAGNOSIS — L70.0 ACNE VULGARIS: ICD-10-CM

## 2019-04-29 RX ORDER — SPIRONOLACTONE 50 MG/1
TABLET, FILM COATED ORAL
Qty: 90 TABLET | Refills: 0 | OUTPATIENT
Start: 2019-04-29

## 2019-04-30 RX ORDER — SPIRONOLACTONE 50 MG/1
50 TABLET, FILM COATED ORAL DAILY
Qty: 90 TABLET | Refills: 3 | Status: SHIPPED | OUTPATIENT
Start: 2019-04-30 | End: 2020-02-24

## 2019-04-30 NOTE — TELEPHONE ENCOUNTER
Prescription approved per [facility].  Jarred March RN

## 2019-05-01 ENCOUNTER — TELEPHONE (OUTPATIENT)
Dept: FAMILY MEDICINE | Facility: CLINIC | Age: 39
End: 2019-05-01

## 2019-05-01 NOTE — TELEPHONE ENCOUNTER
spironolactone (ALDACTONE) 50 MG tablet 90 tablet 3 4/30/2019  No   Sig - Route: Take 1 tablet (50 mg) by mouth daily - Oral   Sent to pharmacy as: spironolactone (ALDACTONE) 50 MG tablet   Class: E-Prescribe   Order: [id number]   E-Prescribing Status: Receipt confirmed by pharmacy (4/30/2019  9:51 AM CDT)   Printout Tracking     External Result Report   Pharmacy     [id number] - [place], [place] - [address] [facility] [address]

## 2019-05-13 DIAGNOSIS — R45.4 IRRITABILITY: ICD-10-CM

## 2019-05-13 DIAGNOSIS — G43.839 MENSTRUAL MIGRAINE, INTRACTABLE, WITHOUT STATUS MIGRAINOSUS: ICD-10-CM

## 2019-05-13 DIAGNOSIS — L70.0 ACNE VULGARIS: ICD-10-CM

## 2019-05-14 RX ORDER — NORGESTIMATE AND ETHINYL ESTRADIOL
KIT
Qty: 112 TABLET | Refills: 1 | Status: SHIPPED | OUTPATIENT
Start: 2019-05-14 | End: 2019-11-04

## 2019-05-14 NOTE — TELEPHONE ENCOUNTER
"MONONESSA TABLETS 28S      Last Written Prescription Date:  2/5/19  Last Fill Quantity: 112,   # refills: 0  Last Office Visit: 3/13/19  Future Office visit:    Next 5 appointments (look out 90 days)    Aug 02, 2019  8:00 AM CDT  Jonelle Cardoza with Pia Mar MD  [facility] [address] [facility] [id number] [phone]           Requested Prescriptions   Pending Prescriptions Disp Refills     MONONESSA 0.25-35 MG-MCG tablet [Pharmacy Med Name: MONONESSA TABLETS 28S] 112 tablet 0     Sig: TAKE 1 ACTIVE PILL DAILY FOR 12 WEEKS, SKIP PLACEBO PILLS       Contraceptives Protocol Passed - 5/13/2019  8:57 PM        Passed - Patient is not a current smoker if age is 35 or older        Passed - Recent (12 mo) or future (30 days) visit within the authorizing provider's specialty     Patient had office visit in the last 12 months or has a visit in the next 30 days with authorizing provider or within the authorizing provider's specialty.  See \"Patient Info\" tab in inbasket, or \"Choose Columns\" in Meds & Orders section of the refill encounter.              Passed - Medication is active on med list        Passed - No active pregnancy on record        Passed - No positive pregnancy test in past 12 months          "

## 2019-07-08 DIAGNOSIS — R11.0 NAUSEA: ICD-10-CM

## 2019-07-08 RX ORDER — ONDANSETRON 4 MG/1
TABLET, ORALLY DISINTEGRATING ORAL
Qty: 18 TABLET | Refills: 0 | Status: SHIPPED | OUTPATIENT
Start: 2019-07-08 | End: 2019-07-19

## 2019-07-08 NOTE — TELEPHONE ENCOUNTER
Patient notified of all of Danni's instructions as noted below. Kendra said that she will check her schedule and call to schedule an appointment with a PRovider here.  Jarred March RN

## 2019-07-08 NOTE — TELEPHONE ENCOUNTER
It looks like she rarely uses these, has seen GI for nausea.  I refilled x1, but has been one year since a medication check. Recommend that she follow up for medication check  Judie Vora PA-C

## 2019-07-08 NOTE — TELEPHONE ENCOUNTER
Pt resting quietly in stretcher with family members at bedside - remains on cardiac monitor     Routing refill request to provider for review/approval because:  Drug not on the [facility] refill protocol   Jarred March RN

## 2019-07-08 NOTE — TELEPHONE ENCOUNTER
"ONDANSETRON ODT 4MG TABLETS      Last Written Prescription Date:  3/14/17  Last Fill Quantity: 20,   # refills: 1  Last Office Visit: 3/13/19  Future Office visit:       Requested Prescriptions   Pending Prescriptions Disp Refills     ondansetron (ZOFRAN-ODT) 4 MG ODT tab [Pharmacy Med Name: ONDANSETRON ODT 4MG TABLETS] 18 tablet 0     Sig: DISSOLVE 1 TO 2 TABLETS ON THE TONGUE EVERY 8 HOURS        Antivertigo/Antiemetic Agents Failed - 7/8/2019  8:53 AM        Failed - Recent (12 mo) or future (30 days) visit within the authorizing provider's specialty     Patient had office visit in the last 12 months or has a visit in the next 30 days with authorizing provider or within the authorizing provider's specialty.  See \"Patient Info\" tab in inbasket, or \"Choose Columns\" in Meds & Orders section of the refill encounter.              Passed - Medication is active on med list        Passed - Patient is 18 years of age or older          "

## 2019-07-19 ENCOUNTER — TELEPHONE (OUTPATIENT)
Dept: FAMILY MEDICINE | Facility: CLINIC | Age: 39
End: 2019-07-19

## 2019-07-19 ENCOUNTER — OFFICE VISIT (OUTPATIENT)
Dept: FAMILY MEDICINE | Facility: CLINIC | Age: 39
End: 2019-07-19
Payer: COMMERCIAL

## 2019-07-19 VITALS
DIASTOLIC BLOOD PRESSURE: 89 MMHG | HEART RATE: 53 BPM | HEIGHT: 66 IN | BODY MASS INDEX: 23.45 KG/M2 | RESPIRATION RATE: 14 BRPM | WEIGHT: 145.9 LBS | SYSTOLIC BLOOD PRESSURE: 126 MMHG | TEMPERATURE: 98.7 F

## 2019-07-19 DIAGNOSIS — Z12.31 ENCOUNTER FOR SCREENING MAMMOGRAM FOR BREAST CANCER: ICD-10-CM

## 2019-07-19 DIAGNOSIS — E03.9 ACQUIRED HYPOTHYROIDISM: ICD-10-CM

## 2019-07-19 DIAGNOSIS — Z00.01 ENCOUNTER FOR ROUTINE ADULT MEDICAL EXAM WITH ABNORMAL FINDINGS: Primary | ICD-10-CM

## 2019-07-19 DIAGNOSIS — Z13.220 SCREENING FOR LIPID DISORDERS: ICD-10-CM

## 2019-07-19 DIAGNOSIS — Z12.4 SCREENING FOR CERVICAL CANCER: ICD-10-CM

## 2019-07-19 DIAGNOSIS — Z11.3 SCREEN FOR STD (SEXUALLY TRANSMITTED DISEASE): ICD-10-CM

## 2019-07-19 DIAGNOSIS — R11.0 NAUSEA: ICD-10-CM

## 2019-07-19 DIAGNOSIS — F31.0 BIPOLAR DISORDER, CURRENT EPISODE HYPOMANIC (H): ICD-10-CM

## 2019-07-19 DIAGNOSIS — F31.9 BIPOLAR I DISORDER (H): ICD-10-CM

## 2019-07-19 DIAGNOSIS — K58.2 IRRITABLE BOWEL SYNDROME WITH BOTH CONSTIPATION AND DIARRHEA: ICD-10-CM

## 2019-07-19 DIAGNOSIS — F43.22 ADJUSTMENT DISORDER WITH ANXIETY: ICD-10-CM

## 2019-07-19 PROCEDURE — 87591 N.GONORRHOEAE DNA AMP PROB: CPT | Performed by: FAMILY MEDICINE

## 2019-07-19 PROCEDURE — 87491 CHLMYD TRACH DNA AMP PROBE: CPT | Performed by: FAMILY MEDICINE

## 2019-07-19 PROCEDURE — G0145 SCR C/V CYTO,THINLAYER,RESCR: HCPCS | Performed by: FAMILY MEDICINE

## 2019-07-19 PROCEDURE — 87624 HPV HI-RISK TYP POOLED RSLT: CPT | Performed by: FAMILY MEDICINE

## 2019-07-19 PROCEDURE — 99396 PREV VISIT EST AGE 40-64: CPT | Performed by: FAMILY MEDICINE

## 2019-07-19 PROCEDURE — 99213 OFFICE O/P EST LOW 20 MIN: CPT | Mod: 25 | Performed by: FAMILY MEDICINE

## 2019-07-19 RX ORDER — BUSPIRONE HYDROCHLORIDE 10 MG/1
10 TABLET ORAL
COMMUNITY
Start: 2019-03-01 | End: 2019-10-08

## 2019-07-19 RX ORDER — LEVOTHYROXINE SODIUM 100 UG/1
TABLET ORAL
Qty: 90 TABLET | Refills: 3 | Status: SHIPPED | OUTPATIENT
Start: 2019-07-19 | End: 2020-08-20

## 2019-07-19 RX ORDER — DICYCLOMINE HYDROCHLORIDE 10 MG/1
10 CAPSULE ORAL
COMMUNITY
Start: 2019-03-01 | End: 2019-10-08

## 2019-07-19 RX ORDER — ONDANSETRON 8 MG/1
8 TABLET, ORALLY DISINTEGRATING ORAL EVERY 8 HOURS PRN
Qty: 30 TABLET | Refills: 1 | Status: SHIPPED | OUTPATIENT
Start: 2019-07-19 | End: 2019-12-01

## 2019-07-19 ASSESSMENT — MIFFLIN-ST. JEOR: SCORE: 1348.55

## 2019-07-19 NOTE — TELEPHONE ENCOUNTER
Central Prior Authorization Team   Phone: [phone]    PA Initiation    Medication: ondansetron (ZOFRAN-ODT) 8 MG ODT tab  Insurance Company: [employer] - Phone [phone] Fax [phone]  Pharmacy Filling the Rx: [facility] - [place], [place] - [address]  Filling Pharmacy Phone: [phone]  Filling Pharmacy Fax:    Start Date: 7/19/2019    PRIOR AUTHORIZATION DENIED    Medication: ondansetron (ZOFRAN-ODT) 8 MG ODT tab    Denial Date: 7/19/2019    Denial Rational: Patient does not meet the requirements of their plan. Zofran plan covers additional quantities of this drug when the patient has met one of these conditions: Patient has hypermesis gravidarum and request is for ondansetron, patient is undergoing radiation therapy, patient is undergoing emetogenic chemotherapy. The request has been denied based on the information we have.         Appeal Information: If you would like to appeal this decision we would need a letter of medical necessity in order to begin the appeal process. The sooner the letter is written, and we are notified the letter is in the patient's chart, the sooner we can get the appeal initiated. Some appeals can take up to 30 days to be completed.        Called insurance to have denial letter resent.

## 2019-07-19 NOTE — PROGRESS NOTES
"   SUBJECTIVE:   CC: Kesha Carter is an 38 year old woman who presents for preventive health visit.     Healthy Habits:    Do you get at least three servings of calcium containing foods daily (dairy, green leafy vegetables, etc.)? No     Amount of exercise or daily activities, outside of work: 5 day(s) per week    Problems taking medications regularly No    Medication side effects: No    Have you had an eye exam in the past two years? yes    Do you see a dentist twice per year? yes    Do you have sleep apnea, excessive snoring or daytime drowsiness?no    Patient understands that additional issues/concerns addressed,  which are above and beyond typical preventative care, may be charged as an extra cost to the patient.     Having stomach and intestinal issues  Was at [facility] x 2 years and then went to [facility] this year : \"upper gastric accomodation\" and \"IBS\"  Was given a couple meds and a couple options  - currently taking Buspar and Bentyl. She takes them intermittently       Takes zofran for nausea - says she is taking more than she should  Intermittent flares     [facility] note 19 -   Extensive outside GI evaluation including upper GI endoscopy, colonoscopy with biopsy as well as MR enterography which will are unrevealing. She has tried the FODMAP diet as well as gluten and dairy free 1 without any significant improvement of her symptoms.    Symptoms suggest likely IBS mixed, with functional dyspepsia  Recommendations as outlined in Dr. Dewey's note is to proceed with the followin. Stool testing for bile acid malabsorption, C diff  2. Gastric accommodation study  3. Breath test to evaluate for fructose malabsorption  4. Celiac disease gluten free cascade assessment    On klonopin for sleep  And lithium for bipolar   Occasionally takes temazepam for sleep       Endocrine - on brand name synthroid per endo at [facility] -   \"She is describing bouts of intestinal cramping with diarrhea lasting for 4-5 days, comes in " "spells   I have noted that she has had fluctuations in her tsh levels without changing her levothyroxine dose   dicussed we will have her stop the generic levothyroxine now   recommend she swicth to brand name tirosint  Discussed can try switching to brand name tirosint   This has a gelatin capsule, water and the hormone   No colors, dyes, fillers or gluten   Felt to be more bioavailable   She can try the same to see if it clinically helps her feel better , if there is an allergy component to the generic levothyroxine it should improve with the switch \"        Today's PHQ-2 Score: 0  PHQ-2 (  [employer]) 2019   Q1: Little interest or pleasure in doing things 0 0   Q2: Feeling down, depressed or hopeless 0 0   PHQ-2 Score 0 0       Abuse: Current or Past(Physical, Sexual or Emotional)- No  Do you feel safe in your environment? Yes    Social History     Tobacco Use     Smoking status: Former Smoker     Types: Cigarettes     Last attempt to quit: 2016     Years since quittin.9     Smokeless tobacco: Never Used   Substance Use Topics     Alcohol use: Yes     Comment: Once a month      If you drink alcohol do you typically have >3 drinks per day or >7 drinks per week? No                     Reviewed orders with patient.  Reviewed health maintenance and updated orders accordingly - Yes  Labs reviewed in [facility]    Mammogram Screening: Patient under age 50, mutual decision reflected in health maintenance.      Pertinent mammograms are reviewed under the imaging tab.  History of abnormal Pap smear:   Last 3 Pap Results:     PAP / HPV Latest Ref Rng & Units 2016   PAP - NIL NIL   HPV 16 DNA NEG Negative -   HPV 18 DNA NEG Negative -   OTHER HR HPV NEG Negative -     Reviewed and updated as needed this visit by clinical staff       Reviewed and updated as needed this visit by Provider          ROS:  CONSTITUTIONAL: NEGATIVE for fever, chills, change in weight  INTEGUMENTARU/SKIN: NEGATIVE " for worrisome rashes, moles or lesions  EYES: NEGATIVE for vision changes or irritation  ENT: NEGATIVE for ear, mouth and throat problems  RESP: NEGATIVE for significant cough or SOB  BREAST: NEGATIVE for masses, tenderness or discharge  CV: NEGATIVE for chest pain, palpitations or peripheral edema  GI: NEGATIVE for nausea, abdominal pain, heartburn, or change in bowel habits  : NEGATIVE for unusual urinary or vaginal symptoms. Periods are regular.  MUSCULOSKELETAL: NEGATIVE for significant arthralgias or myalgia  NEURO: NEGATIVE for weakness, dizziness or paresthesias  PSYCHIATRIC: NEGATIVE for changes in mood or affect    OBJECTIVE:   There were no vitals taken for this visit.  EXAM:  GENERAL: healthy, alert and no distress  EYES: Eyes grossly normal to inspection, PERRL and conjunctivae and sclerae normal  HENT: ear canals and TM's normal, nose and mouth without ulcers or lesions  NECK: no adenopathy, no asymmetry, masses, or scars and thyroid normal to palpation  RESP: lungs clear to auscultation - no rales, rhonchi or wheezes  BREAST: normal without masses, tenderness or nipple discharge and no palpable axillary masses or adenopathy  CV: regular rate and rhythm, normal S1 S2, no S3 or S4, no murmur, click or rub, no peripheral edema and peripheral pulses strong  ABDOMEN: soft, nontender, no hepatosplenomegaly, no masses and bowel sounds normal  MS: no gross musculoskeletal defects noted, no edema  NEURO: Normal strength and tone, mentation intact and speech normal  PSYCH: mentation appears normal, affect normal/bright      ASSESSMENT/PLAN:   (Z00.01) Encounter for routine adult medical exam with abnormal findings  (primary encounter diagnosis)  Comment: We discussed self breast exams, exercise 30mins/day, and calcium with vitamin D at 1200mg/day, preferably from dietary sources.  Diet, Weight loss, and Exercise were discussed as well.   Plan:     (E03.9) Acquired hypothyroidism  Comment: check labs. Med  "filled. Chart reviewed. She wants brand name, per endo recommendations   Plan: levothyroxine (SYNTHROID/LEVOTHROID) 100 MCG         tablet, TSH with free T4 reflex            (Z13.220) Screening for lipid disorders  Comment:   Plan: Lipid panel reflex to direct LDL Fasting            (Z12.4) Screening for cervical cancer  Comment:   Plan: Pap imaged thin layer screen with HPV -         recommended age 30 - 65 years (select HPV order        below), HPV High Risk Types DNA Cervical            (Z12.31) Encounter for screening mammogram for breast cancer  Comment:   Plan: *MA Screening Digital Bilateral            (K58.2) Irritable bowel syndrome with both constipation and diarrhea  Comment: and upper gastric accomodation, per [place] 7/2019  - chart reviewed and discussed. We can fill these meds for her in the future if this is the right med combo   Plan:  New meds: buspar and bentyl    (F31.9) Bipolar I disorder (H)  Comment:   Plan:     (F43.22) Adjustment disorder with anxiety  Comment:   Plan:     (F31.0) Bipolar disorder, current episode hypomanic (H)  Comment:   Plan:     (Z11.3) Screen for STD (sexually transmitted disease)  Comment:   Plan: NEISSERIA GONORRHOEA PCR, CHLAMYDIA TRACHOMATIS        PCR            (R11.0) Nausea  Comment:   Plan: ondansetron (ZOFRAN-ODT) 8 MG ODT tab              COUNSELING:   Reviewed preventive health counseling, as reflected in patient instructions       Regular exercise       Healthy diet/nutrition    Estimated body mass index is 22.6 kg/m  as calculated from the following:    Height as of 3/13/19: 1.676 m (5' 6\").    Weight as of 3/13/19: 63.5 kg (140 lb).         reports that she quit smoking about 2 years ago. Her smoking use included cigarettes. She has never used smokeless tobacco.      Counseling Resources:  ATP IV Guidelines  Pooled Cohorts Equation Calculator  Breast Cancer Risk Calculator  FRAX Risk Assessment  ICSI Preventive Guidelines  Dietary Guidelines for Americans, " 2010  USDA's MyPlate  ASA Prophylaxis  Lung CA Screening    Funmi Goodson MD  [facility]

## 2019-07-20 NOTE — ASSESSMENT & PLAN NOTE
"Endocrine - on brand name synthroid per endo at [facility] -   \"She is describing bouts of intestinal cramping with diarrhea lasting for 4-5 days, comes in spells   I have noted that she has had fluctuations in her tsh levels without changing her levothyroxine dose   dicussed we will have her stop the generic levothyroxine now   recommend she swicth to brand name tirosint  Discussed can try switching to brand name tirosint   This has a gelatin capsule, water and the hormone   No colors, dyes, fillers or gluten   Felt to be more bioavailable   She can try the same to see if it clinically helps her feel better , if there is an allergy component to the generic levothyroxine it should improve with the switch \"  "

## 2019-07-21 LAB
C TRACH DNA SPEC QL NAA+PROBE: NEGATIVE
N GONORRHOEA DNA SPEC QL NAA+PROBE: NEGATIVE
SPECIMEN SOURCE: NORMAL
SPECIMEN SOURCE: NORMAL

## 2019-07-23 LAB
COPATH REPORT: NORMAL
PAP: NORMAL

## 2019-07-25 DIAGNOSIS — E03.9 ACQUIRED HYPOTHYROIDISM: ICD-10-CM

## 2019-07-25 DIAGNOSIS — Z13.220 SCREENING FOR LIPID DISORDERS: ICD-10-CM

## 2019-07-25 LAB
CHOLEST SERPL-MCNC: 154 MG/DL
FINAL DIAGNOSIS: NORMAL
HDLC SERPL-MCNC: 85 MG/DL
HPV HR 12 DNA CVX QL NAA+PROBE: NEGATIVE
HPV16 DNA SPEC QL NAA+PROBE: NEGATIVE
HPV18 DNA SPEC QL NAA+PROBE: NEGATIVE
LDLC SERPL CALC-MCNC: 55 MG/DL
NONHDLC SERPL-MCNC: 69 MG/DL
SPECIMEN DESCRIPTION: NORMAL
SPECIMEN SOURCE CVX/VAG CYTO: NORMAL
TRIGL SERPL-MCNC: 69 MG/DL
TSH SERPL DL<=0.005 MIU/L-ACNC: 0.82 MU/L (ref 0.4–4)

## 2019-07-25 PROCEDURE — 80061 LIPID PANEL: CPT | Performed by: FAMILY MEDICINE

## 2019-07-25 PROCEDURE — 36415 COLL VENOUS BLD VENIPUNCTURE: CPT | Performed by: FAMILY MEDICINE

## 2019-07-25 PROCEDURE — 84443 ASSAY THYROID STIM HORMONE: CPT | Performed by: FAMILY MEDICINE

## 2019-07-26 NOTE — TELEPHONE ENCOUNTER
Please let Kendra know that her insurance is denying it. It may go through if her GI doctor writes the prescription.   MEGHNA Goodson MD

## 2019-07-26 NOTE — TELEPHONE ENCOUNTER
Kendra states she talked to pharmacy and they said the problem was that she tried to fill to soon. It should go through later. She will let us know if there are any problems when its time.

## 2019-08-23 ENCOUNTER — MYC REFILL (OUTPATIENT)
Dept: FAMILY MEDICINE | Facility: CLINIC | Age: 39
End: 2019-08-23

## 2019-08-23 DIAGNOSIS — L70.0 ACNE VULGARIS: ICD-10-CM

## 2019-08-23 RX ORDER — SPIRONOLACTONE 50 MG/1
50 TABLET, FILM COATED ORAL DAILY
Qty: 90 TABLET | Refills: 3 | Status: CANCELLED | OUTPATIENT
Start: 2019-08-23

## 2019-08-24 DIAGNOSIS — L70.0 ACNE VULGARIS: ICD-10-CM

## 2019-08-26 RX ORDER — SPIRONOLACTONE 50 MG/1
TABLET, FILM COATED ORAL
Qty: 90 TABLET | Refills: 0 | OUTPATIENT
Start: 2019-08-26

## 2019-08-26 NOTE — TELEPHONE ENCOUNTER
"SPIRONOLACTONE 50MG TABLETS      Last Written Prescription Date:  4/30/19  Last Fill Quantity: 90,   # refills: 3  Last Office Visit: 7/19/19  Future Office visit:    Next 5 appointments (look out 90 days)    Oct 04, 2019  7:30 AM CDT  Screening Mammogram with [facility] ([facility]) [address] [place] [address]  [phone]           Requested Prescriptions   Pending Prescriptions Disp Refills     spironolactone (ALDACTONE) 50 MG tablet [Pharmacy Med Name: SPIRONOLACTONE 50MG TABLETS] 90 tablet 0     Sig: TAKE 1 TABLET(50 MG) BY MOUTH DAILY       Diuretics (Including Combos) Protocol Passed - 8/24/2019 10:49 AM        Passed - Blood pressure under 140/90 in past 12 months     BP Readings from Last 3 Encounters:   07/19/19 126/89   03/13/19 120/80   06/19/18 124/80                 Passed - Recent (12 mo) or future (30 days) visit within the authorizing provider's specialty     Patient had office visit in the last 12 months or has a visit in the next 30 days with authorizing provider or within the authorizing provider's specialty.  See \"Patient Info\" tab in inbasket, or \"Choose Columns\" in Meds & Orders section of the refill encounter.              Passed - Medication is active on med list        Passed - Patient is age 18 or older        Passed - No active pregancy on record        Passed - Normal serum creatinine on file in past 12 months     Recent Labs   Lab Test 03/26/19  0744   CR 0.87              Passed - Normal serum potassium on file in past 12 months     Recent Labs   Lab Test 03/26/19  0744   POTASSIUM 4.5                    Passed - Normal serum sodium on file in past 12 months     Recent Labs   Lab Test 03/26/19  0744                 Passed - No positive pregnancy test in past 12 months          "

## 2019-09-08 ENCOUNTER — MYC MEDICAL ADVICE (OUTPATIENT)
Dept: FAMILY MEDICINE | Facility: CLINIC | Age: 39
End: 2019-09-08

## 2019-09-08 DIAGNOSIS — T75.3XXA MOTION SICKNESS, INITIAL ENCOUNTER: ICD-10-CM

## 2019-09-09 RX ORDER — SCOLOPAMINE TRANSDERMAL SYSTEM 1 MG/1
PATCH, EXTENDED RELEASE TRANSDERMAL
Qty: 3 PATCH | Refills: 0 | Status: SHIPPED | OUTPATIENT
Start: 2019-09-09 | End: 2020-02-18

## 2019-10-04 RX ORDER — DICYCLOMINE HYDROCHLORIDE 10 MG/1
10 CAPSULE ORAL
Status: CANCELLED | OUTPATIENT
Start: 2019-10-04

## 2019-10-04 NOTE — TELEPHONE ENCOUNTER
Requested Prescriptions   Pending Prescriptions Disp Refills     dicyclomine (BENTYL) 10 MG capsule       Sig: Take 1 capsule (10 mg) by mouth       There is no refill protocol information for this order        Pt left a message and states she has gotten this medication from another provider from the [place] for Irritable Bowel Syndrome.  She is out of this medication now.  Does have a appt tomorrow with Dr. Goodson for this but if not needed would like to cancel.,      Bridget Houston  [facility]

## 2019-10-07 RX ORDER — BUSPIRONE HYDROCHLORIDE 10 MG/1
10 TABLET ORAL
Status: CANCELLED | OUTPATIENT
Start: 2019-10-07

## 2019-10-07 RX ORDER — DICYCLOMINE HYDROCHLORIDE 10 MG/1
10 CAPSULE ORAL
Status: CANCELLED | OUTPATIENT
Start: 2019-10-07

## 2019-10-07 NOTE — TELEPHONE ENCOUNTER
Routing refill request to provider for review/approval because:  Please advise at her 10/8/19 appointment.  Jarred March RN

## 2019-10-07 NOTE — TELEPHONE ENCOUNTER
Requested Prescriptions   Pending Prescriptions Disp Refills     busPIRone (BUSPAR) 10 MG tablet       Sig: Take 1 tablet (10 mg) by mouth       There is no refill protocol information for this order        Pt is almost out but does have a appt tomorrow with Dr. Goodson.    Pt usually see Dr. Mar, This meds is from [place]      Bridget Houston  [facility]

## 2019-10-07 NOTE — TELEPHONE ENCOUNTER
Please advise buspar refill request at patient's 10/8/19 appointment .   Thank you.  Jarred March RN

## 2019-10-08 ENCOUNTER — OFFICE VISIT (OUTPATIENT)
Dept: FAMILY MEDICINE | Facility: CLINIC | Age: 39
End: 2019-10-08
Payer: COMMERCIAL

## 2019-10-08 VITALS
HEIGHT: 66 IN | BODY MASS INDEX: 24.91 KG/M2 | OXYGEN SATURATION: 99 % | WEIGHT: 155 LBS | DIASTOLIC BLOOD PRESSURE: 86 MMHG | HEART RATE: 61 BPM | SYSTOLIC BLOOD PRESSURE: 122 MMHG

## 2019-10-08 DIAGNOSIS — K31.89 SMALL STOMACH SYNDROME: Primary | ICD-10-CM

## 2019-10-08 DIAGNOSIS — K58.2 IRRITABLE BOWEL SYNDROME WITH BOTH CONSTIPATION AND DIARRHEA: ICD-10-CM

## 2019-10-08 PROCEDURE — 99213 OFFICE O/P EST LOW 20 MIN: CPT | Performed by: FAMILY MEDICINE

## 2019-10-08 RX ORDER — DICYCLOMINE HYDROCHLORIDE 10 MG/1
10 CAPSULE ORAL 3 TIMES DAILY
Qty: 270 CAPSULE | Refills: 1 | Status: SHIPPED | OUTPATIENT
Start: 2019-10-08 | End: 2020-10-05

## 2019-10-08 RX ORDER — BUSPIRONE HYDROCHLORIDE 10 MG/1
10 TABLET ORAL 3 TIMES DAILY
Qty: 270 TABLET | Refills: 1 | Status: SHIPPED | OUTPATIENT
Start: 2019-10-08 | End: 2020-10-05

## 2019-10-08 ASSESSMENT — MIFFLIN-ST. JEOR: SCORE: 1389.83

## 2019-10-08 NOTE — PROGRESS NOTES
SUBJECTIVE:                                                    Kesha Carter is a 38 year old female who presents to clinic today for the following health issues:     Medication Followup of buspirone and  Dicyclomine For IBS and upper gastric accomodation     Taking Medication as prescribed: yes - 3 times day with meals, 30 minute prior     Side Effects:  None    Medication Helping Symptoms:  yes       Problem list and histories reviewed & adjusted, as indicated.  Additional history: she has been taking the buspar 3 times per day regularly and it has been much   Better with this and the bentyl  I reviewed her work up at [facility] and she ended up with a diagnosis of IBS and also low gastric accommodation  She has been feeling much better once she got used to the buspar. She had checked with her psychiatrist before starting the buspar as she has a history of  Kylee. She is not drowsy and she is still seeing her psychiatrist regularly . She would like to have the buspar and bentyl prescribed here.   She is now able to eat lots of different foods. She is still careful.       Patient Active Problem List   Diagnosis     FAMILY HISTORY OF ENDOCRINE DISEASE( other endo or metabol)     CARDIOVASCULAR SCREENING; LDL GOAL LESS THAN 160     Adjustment disorder with anxiety     Bipolar disorder, current episode hypomanic (H)     Hypothyroidism     Irritable bowel syndrome with both constipation and diarrhea     Bipolar I disorder (H)     Past Surgical History:   Procedure Laterality Date     C REPAIR CRUCIATE LIGAMENT,KNEE      Left acl repair     C/SECTION, LOW TRANSVERSE      , Low Transverse     C/SECTION, LOW TRANSVERSE      , Low Transverse     LAPAROSCOPIC CHOLECYSTECTOMY N/A 2017    Procedure: LAPAROSCOPIC CHOLECYSTECTOMY;  Surgeon: Tani Brenner MD;  Location: [facility] OR     MAMMOPLASTY AUGMENTATION BILATERAL  2010    Implants.     TUBAL LIGATION      With C/S       Social  "History     Tobacco Use     Smoking status: Former Smoker     Types: Cigarettes     Last attempt to quit: 8/8/2016     Years since quitting: 3.1     Smokeless tobacco: Never Used   Substance Use Topics     Alcohol use: Yes     Comment: Once a month      Family History   Problem Relation Age of Onset     Thyroid Disease Mother      Arthritis Mother      Hypertension Mother      Diabetes Mother      Anxiety Disorder Mother      Obesity Mother      Thyroid Disease Maternal Grandmother      Hypertension Maternal Grandmother      Cardiovascular Maternal Grandfather      Heart Disease Maternal Grandfather      Hypertension Maternal Grandfather      Hypertension Paternal Grandmother      Cerebrovascular Disease Paternal Grandfather      Obesity Sister            ROS:  Constitutional, HEENT, cardiovascular, pulmonary, gi and gu systems are negative, except as otherwise noted.    OBJECTIVE:                                                    /86   Pulse 61   Ht 1.676 m (5' 6\")   Wt 70.3 kg (155 lb)   SpO2 99%   BMI 25.02 kg/m   Body mass index is 25.02 kg/m .   GENERAL APPEARANCE: healthy, alert and no distress  PSYCH: mentation appears normal and affect normal/bright  MENTAL STATUS EXAM:  Appearance/Behavior: No apparent distress, Neatly groomed and Dressed appropriately for weather  Speech: Normal  Mood/Affect: normal affect  Insight: Adequate       ASSESSMENT/PLAN:                                                      1. Small stomach syndrome she has low accommodation even though she has not had gastric surgery    - dicyclomine (BENTYL) 10 MG capsule; Take 1 capsule (10 mg) by mouth 3 times daily  Dispense: 270 capsule; Refill: 1  - busPIRone (BUSPAR) 10 MG tablet; Take 1 tablet (10 mg) by mouth 3 times daily  Dispense: 270 tablet; Refill: 1    2. Irritable bowel syndrome with both constipation and diarrhea    - dicyclomine (BENTYL) 10 MG capsule; Take 1 capsule (10 mg) by mouth 3 times daily  Dispense: 270 " capsule; Refill: 1  - busPIRone (BUSPAR) 10 MG tablet; Take 1 tablet (10 mg) by mouth 3 times daily  Dispense: 270 tablet; Refill: 1    Will refill medications she had her thyroid checked earlier this year and it was fine.       [facility]

## 2019-10-21 ENCOUNTER — OFFICE VISIT (OUTPATIENT)
Dept: FAMILY MEDICINE | Facility: CLINIC | Age: 39
End: 2019-10-21
Payer: COMMERCIAL

## 2019-10-21 VITALS
TEMPERATURE: 99.5 F | HEIGHT: 66 IN | WEIGHT: 152 LBS | RESPIRATION RATE: 18 BRPM | BODY MASS INDEX: 24.43 KG/M2 | OXYGEN SATURATION: 100 % | HEART RATE: 69 BPM | DIASTOLIC BLOOD PRESSURE: 68 MMHG | SYSTOLIC BLOOD PRESSURE: 112 MMHG

## 2019-10-21 DIAGNOSIS — J02.9 VIRAL PHARYNGITIS: Primary | ICD-10-CM

## 2019-10-21 LAB
DEPRECATED S PYO AG THROAT QL EIA: NORMAL
SPECIMEN SOURCE: NORMAL

## 2019-10-21 PROCEDURE — 87880 STREP A ASSAY W/OPTIC: CPT | Performed by: PHYSICIAN ASSISTANT

## 2019-10-21 PROCEDURE — 99213 OFFICE O/P EST LOW 20 MIN: CPT | Mod: 25 | Performed by: PHYSICIAN ASSISTANT

## 2019-10-21 PROCEDURE — 87081 CULTURE SCREEN ONLY: CPT | Performed by: PHYSICIAN ASSISTANT

## 2019-10-21 ASSESSMENT — MIFFLIN-ST. JEOR: SCORE: 1376.22

## 2019-10-21 NOTE — PROGRESS NOTES
Subjective     Kesha Carter is a 38 year old female who presents to clinic today for the following health issues:    HPI   ENT Symptoms             Symptoms: cc Present Absent Comment   Fever/Chills  x  100.8    Fatigue  x  Increased   Muscle Aches  x  Generalized   Eye Irritation   x    Sneezing   x    Nasal Dov/Drg   x    Sinus Pressure/Pain   x    Loss of smell   x    Dental pain  x  Upper teeth   Sore Throat X X  Painful swallowing   Swollen Glands   x    Ear Pain/Fullness  x  Bilateral ears painful   Cough   x    Wheeze   x    Chest Pain   x    Shortness of breath   x    Rash   x    Other   x      Symptom duration:  1 day   Symptom severity:  moderate/severe.  Kept her up last night   Treatments tried:  Tylenol- last dose taken 9:00 AM   Contacts:  None     Mandi Watkins [facility]        Patient Active Problem List   Diagnosis     FAMILY HISTORY OF ENDOCRINE DISEASE( other endo or metabol)     CARDIOVASCULAR SCREENING; LDL GOAL LESS THAN 160     Adjustment disorder with anxiety     Bipolar disorder, current episode hypomanic (H)     Hypothyroidism     Irritable bowel syndrome with both constipation and diarrhea     Bipolar I disorder (H)     Past Surgical History:   Procedure Laterality Date     C REPAIR CRUCIATE LIGAMENT,KNEE      Left acl repair     C/SECTION, LOW TRANSVERSE      , Low Transverse     C/SECTION, LOW TRANSVERSE      , Low Transverse     LAPAROSCOPIC CHOLECYSTECTOMY N/A 2017    Procedure: LAPAROSCOPIC CHOLECYSTECTOMY;  Surgeon: Tani Brenner MD;  Location: [facility]     MAMMOPLASTY AUGMENTATION BILATERAL  2010    Implants.     TUBAL LIGATION      With C/S       Social History     Tobacco Use     Smoking status: Former Smoker     Types: Cigarettes     Last attempt to quit: 2016     Years since quitting: 3.2     Smokeless tobacco: Never Used   Substance Use Topics     Alcohol use: Yes     Comment: Once a month      Family History   Problem Relation Age of  Onset     Thyroid Disease Mother      Arthritis Mother      Hypertension Mother      Diabetes Mother      Anxiety Disorder Mother      Obesity Mother      Thyroid Disease Maternal Grandmother      Hypertension Maternal Grandmother      Cardiovascular Maternal Grandfather      Heart Disease Maternal Grandfather      Hypertension Maternal Grandfather      Hypertension Paternal Grandmother      Cerebrovascular Disease Paternal Grandfather      Obesity Sister          Current Outpatient Medications   Medication Sig Dispense Refill     acetaminophen-codeine 120-12 MG/5ML suspension Take 5 mLs by mouth every 6 hours as needed for moderate to severe pain 473 mL 0     ammonium lactate (LAC-HYDRIN) 12 % cream Apply topically 2 times daily 120 g 11     busPIRone (BUSPAR) 10 MG tablet Take 1 tablet (10 mg) by mouth 3 times daily 270 tablet 1     cholestyramine (QUESTRAN) 4 GM/DOSE powder Take 2 g by mouth 3 times daily (with meals) Take as directed to control GI symptoms 540 g 3     clonazePAM (KLONOPIN) 0.5 MG tablet Take 0.5-1 tablets (0.25-0.5 mg) by mouth nightly as needed for other (insomnia) 20 tablet 0     levothyroxine (SYNTHROID/LEVOTHROID) 100 MCG tablet TAKE 1 TABLET(100 MCG) BY MOUTH DAILY 90 tablet 3     lithium 600 MG capsule Take 600 one day 900 mg the next day on an alternating schedule       MONONESSA 0.25-35 MG-MCG tablet TAKE 1 ACTIVE PILL DAILY FOR 12 WEEKS, SKIP PLACEBO PILLS 112 tablet 1     multivitamin, therapeutic with minerals (MULTI-VITAMIN) TABS tablet Take 1 tablet by mouth daily 100 tablet 3     ondansetron (ZOFRAN-ODT) 8 MG ODT tab Take 1 tablet (8 mg) by mouth every 8 hours as needed for nausea 30 tablet 1     polyethylene glycol (MIRALAX) powder Take 1 capful by mouth daily       Probiotic Product (PROBIOTIC DAILY PO)        scopolamine (TRANSDERM) 1 MG/3DAYS 72 hr patch Apply 1 patch to hairless area behind one ear at least 4 hours before travel.  Remove old patch and change every 3 days (72  "hours). 3 patch 0     dicyclomine (BENTYL) 10 MG capsule Take 1 capsule (10 mg) by mouth 3 times daily 270 capsule 1     Simethicone (PHAZYME MAXIMUM STRENGTH) 250 MG CAPS Take by mouth as needed       spironolactone (ALDACTONE) 50 MG tablet Take 1 tablet (50 mg) by mouth daily 90 tablet 3     temazepam (RESTORIL) 15 MG capsule  30 capsule      ZOLMitriptan (ZOMIG) 5 MG tablet TAKE 1 TABLET(5 MG) BY MOUTH AT ONSET OF HEADACHE FOR MIGRAINE. MAY REPEAT IN 2 HOURS. MAX 2 TABLETS/ 24 HOURS 6 tablet 1     BP Readings from Last 3 Encounters:   10/21/19 112/68   10/08/19 122/86   07/19/19 126/89    Wt Readings from Last 3 Encounters:   10/21/19 68.9 kg (152 lb)   10/08/19 70.3 kg (155 lb)   07/19/19 66.2 kg (145 lb 14.4 oz)                      Reviewed and updated as needed this visit by Provider         Review of Systems   ROS COMP: Constitutional, HEENT, cardiovascular, pulmonary, gi and gu systems are negative, except as otherwise noted.      Objective    /68   Pulse 69   Temp 99.5  F (37.5  C) (Tympanic)   Resp 18   Ht 1.676 m (5' 6\")   Wt 68.9 kg (152 lb)   LMP  (LMP Unknown)   SpO2 100%   Breastfeeding? No   BMI 24.53 kg/m    Body mass index is 24.53 kg/m .  Physical Exam   GENERAL: healthy, alert and no distress  EYES: Eyes grossly normal to inspection, PERRL and conjunctivae and sclerae normal  HENT: ear canals and TM's normal, nose and mouth without ulcers or lesions  NECK: no adenopathy, no asymmetry, masses, or scars and thyroid normal to palpation  RESP: lungs clear to auscultation - no rales, rhonchi or wheezes  CV: regular rate and rhythm, normal S1 S2, no S3 or S4, no murmur, click or rub, no peripheral edema and peripheral pulses strong  ABDOMEN: soft, nontender, no hepatosplenomegaly, no masses and bowel sounds normal  MS: no gross musculoskeletal defects noted, no edema    Diagnostic Test Results:  Labs reviewed in Epic  Results for orders placed or performed in visit on 10/21/19 (from the " past 24 hour(s))   Strep, Rapid Screen   Result Value Ref Range    Specimen Description Throat     Rapid Strep A Screen       NEGATIVE: No Group A streptococcal antigen detected by immunoassay, await culture report.           Assessment & Plan     1. Viral pharyngitis  Pharyngitis  (primary encounter diagnosis)     I discussed the pathophysiology of pharyngitis and likely viral etiology.   Discussed general respiratory tract infection care including importance of hydration, rest, over the counter therapies and techniques to prevent future infection as well as transmission to others.  Discussed signs or symptoms that would indicate need for recheck.    Patient was instructed to f/u or call if symptoms worsen or fail to improve as anticipated.       - Strep, Rapid Screen  - Beta strep group A culture  - acetaminophen-codeine 120-12 MG/5ML suspension; Take 5 mLs by mouth every 6 hours as needed for moderate to severe pain  Dispense: 473 mL; Refill: 0       I warned Keshasuzanne Carter that tylenol with codeine may cause drowsiness and she is not to drive or operate heavy machinery after taking this medication.     Patient Instructions   May use flonase or afrin for sinus pressure       Return in about 1 week (around 10/28/2019) for a recheck if symptoms do not improve.    Maliha Shine PA-C  [facility]

## 2019-10-22 LAB
BACTERIA SPEC CULT: NORMAL
SPECIMEN SOURCE: NORMAL

## 2019-10-31 NOTE — PROGRESS NOTES
SUBJECTIVE:                                                    Kesha Carter is 39 year old female   Chief Complaint   Patient presents with     URI     Symptoms 2 weeks. States somewhat productive cough, sinus/nasal congestion and pressure, post nasal drip, sore throat.  Has tried  OTC decongestant, flonase, and OTC cough medication, states not helpful. Negative strep swabs 10/21/19      Problem list and histories reviewed & adjusted, as indicated.  Additional history: as documented    Patient Active Problem List   Diagnosis     FAMILY HISTORY OF ENDOCRINE DISEASE( other endo or metabol)     CARDIOVASCULAR SCREENING; LDL GOAL LESS THAN 160     Adjustment disorder with anxiety     Bipolar disorder, current episode hypomanic (H)     Hypothyroidism     Irritable bowel syndrome with both constipation and diarrhea     Bipolar I disorder (H)     Past Surgical History:   Procedure Laterality Date     C REPAIR CRUCIATE LIGAMENT,KNEE      Left acl repair     C/SECTION, LOW TRANSVERSE      , Low Transverse     C/SECTION, LOW TRANSVERSE      , Low Transverse     LAPAROSCOPIC CHOLECYSTECTOMY N/A 2017    Procedure: LAPAROSCOPIC CHOLECYSTECTOMY;  Surgeon: Tani Brenner MD;  Location: [facility] OR     MAMMOPLASTY AUGMENTATION BILATERAL  2010    Implants.     TUBAL LIGATION      With C/S       Social History     Tobacco Use     Smoking status: Former Smoker     Types: Cigarettes     Last attempt to quit: 2016     Years since quitting: 3.2     Smokeless tobacco: Never Used   Substance Use Topics     Alcohol use: Yes     Comment: Once a month      Family History   Problem Relation Age of Onset     Thyroid Disease Mother      Arthritis Mother      Hypertension Mother      Diabetes Mother      Anxiety Disorder Mother      Obesity Mother      Thyroid Disease Maternal Grandmother      Hypertension Maternal Grandmother      Cardiovascular Maternal Grandfather      Heart Disease Maternal  Grandfather      Hypertension Maternal Grandfather      Hypertension Paternal Grandmother      Cerebrovascular Disease Paternal Grandfather      Obesity Sister          Current Outpatient Medications   Medication Sig Dispense Refill     ammonium lactate (LAC-HYDRIN) 12 % cream Apply topically 2 times daily 120 g 11     busPIRone (BUSPAR) 10 MG tablet Take 1 tablet (10 mg) by mouth 3 times daily 270 tablet 1     cholestyramine (QUESTRAN) 4 GM/DOSE powder Take 2 g by mouth 3 times daily (with meals) Take as directed to control GI symptoms 540 g 3     clonazePAM (KLONOPIN) 0.5 MG tablet Take 0.5-1 tablets (0.25-0.5 mg) by mouth nightly as needed for other (insomnia) 20 tablet 0     dicyclomine (BENTYL) 10 MG capsule Take 1 capsule (10 mg) by mouth 3 times daily 270 capsule 1     levothyroxine (SYNTHROID/LEVOTHROID) 100 MCG tablet TAKE 1 TABLET(100 MCG) BY MOUTH DAILY 90 tablet 3     lithium 600 MG capsule Take 600 one day 900 mg the next day on an alternating schedule       MONONESSA 0.25-35 MG-MCG tablet TAKE 1 ACTIVE PILL DAILY FOR 12 WEEKS, SKIP PLACEBO PILLS 112 tablet 1     multivitamin, therapeutic with minerals (MULTI-VITAMIN) TABS tablet Take 1 tablet by mouth daily 100 tablet 3     ondansetron (ZOFRAN-ODT) 8 MG ODT tab Take 1 tablet (8 mg) by mouth every 8 hours as needed for nausea 30 tablet 1     polyethylene glycol (MIRALAX) powder Take 1 capful by mouth daily       Probiotic Product (PROBIOTIC DAILY PO)        spironolactone (ALDACTONE) 50 MG tablet Take 1 tablet (50 mg) by mouth daily 90 tablet 3     temazepam (RESTORIL) 15 MG capsule  30 capsule      ZOLMitriptan (ZOMIG) 5 MG tablet TAKE 1 TABLET(5 MG) BY MOUTH AT ONSET OF HEADACHE FOR MIGRAINE. MAY REPEAT IN 2 HOURS. MAX 2 TABLETS/ 24 HOURS 6 tablet 1     acetaminophen-codeine 120-12 MG/5ML suspension Take 5 mLs by mouth every 6 hours as needed for moderate to severe pain (Patient not taking: Reported on 11/1/2019) 473 mL 0     scopolamine (TRANSDERM) 1  "MG/3DAYS 72 hr patch Apply 1 patch to hairless area behind one ear at least 4 hours before travel.  Remove old patch and change every 3 days (72 hours). (Patient not taking: Reported on 11/1/2019) 3 patch 0     Simethicone (PHAZYME MAXIMUM STRENGTH) 250 MG CAPS Take by mouth as needed       Allergies   Allergen Reactions     Erythromycin Nausea and Vomiting     No Clinical Screening - See Comments      Hives from a contrast dye given when had a CT scan. Patient unsure of name.      Liquid Adhesive Itching and Rash     Recent Labs   Lab Test 07/25/19  0802 03/26/19  0744 11/06/18  0805  10/11/17  0830 04/12/17  1647 12/23/16  1830  03/29/13  0916   LDL 55  --   --   --  63  --   --   --  81   HDL 85  --   --   --  108  --   --   --  65   TRIG 69  --   --   --  54  --   --   --  49   ALT  --   --   --   --  23 19 15   < > 24   CR  --  0.87 0.84  --  0.87 0.80 0.73   < > 0.80   GFRESTIMATED  --  83 75  --  72 80 89   < > 82   GFRESTBLACK  --  >90 >90  --  87 >90   GFR Calc   >90   GFR Calc     < > >90   POTASSIUM  --  4.5  --   --  4.8 4.2 3.6   < > 4.4   TSH 0.82  --  2.15   < > 4.43* 2.63  --    < >  --     < > = values in this interval not displayed.      BP Readings from Last 3 Encounters:   11/01/19 118/84   10/21/19 112/68   10/08/19 122/86    Wt Readings from Last 3 Encounters:   11/01/19 68.6 kg (151 lb 3.2 oz)   10/21/19 68.9 kg (152 lb)   10/08/19 70.3 kg (155 lb)         ROS:  Constitutional, HEENT, cardiovascular, pulmonary, gi and gu systems are negative, except as otherwise noted.    OBJECTIVE:                                                    /84   Pulse 55   Temp 98.5  F (36.9  C) (Tympanic)   Resp 12   Ht 1.676 m (5' 6\")   Wt 68.6 kg (151 lb 3.2 oz)   LMP  (LMP Unknown)   SpO2 100%   BMI 24.40 kg/m    GENERAL APPEARANCE ADULT: alert, appears ill, no distress  HENT: hoarse voice, right TM abnormal, dull, left TM abnormal, dull, throat/mouth:mild erythema, " mucous membranes moist  RESP: lungs clear to auscultation   CV: normal rate, regular rhythm, no murmur or gallop  Diagnostic Test Results:  none      ASSESSMENT/PLAN:                                                    1. Acute recurrent pansinusitis  Viral vs bacterial.  Trial of antibiotics but if not effective viral infection and self limiting.  If effective and recurs will repeat, see patient instructions.  - azithromycin (ZITHROMAX) 250 MG tablet; Two tablets first day, then one tablet daily for four days.  Dispense: 6 tablet; Refill: 1  - guaiFENesin-codeine (ROBITUSSIN AC) 100-10 MG/5ML solution; Take 5-10 mLs by mouth every 4 hours as needed for cough  Dispense: 236 mL; Refill: 0    2. Acute recurrent streptococcal tonsillitis  Viral vs bacterial.  Trial of antibiotics but if not effective viral infection and self limiting.  If effective and recurs will repeat, see patient instructions.  - azithromycin (ZITHROMAX) 250 MG tablet; Two tablets first day, then one tablet daily for four days.  Dispense: 6 tablet; Refill: 1  - guaiFENesin-codeine (ROBITUSSIN AC) 100-10 MG/5ML solution; Take 5-10 mLs by mouth every 4 hours as needed for cough  Dispense: 236 mL; Refill: 0    Melody Carrillo MD  [facility]

## 2019-11-01 ENCOUNTER — OFFICE VISIT (OUTPATIENT)
Dept: FAMILY MEDICINE | Facility: CLINIC | Age: 39
End: 2019-11-01
Payer: COMMERCIAL

## 2019-11-01 ENCOUNTER — HOSPITAL ENCOUNTER (OUTPATIENT)
Dept: MAMMOGRAPHY | Facility: CLINIC | Age: 39
Discharge: HOME | End: 2019-11-01
Attending: FAMILY MEDICINE | Admitting: FAMILY MEDICINE
Payer: COMMERCIAL

## 2019-11-01 VITALS
DIASTOLIC BLOOD PRESSURE: 84 MMHG | HEART RATE: 55 BPM | HEIGHT: 66 IN | SYSTOLIC BLOOD PRESSURE: 118 MMHG | OXYGEN SATURATION: 100 % | BODY MASS INDEX: 24.3 KG/M2 | TEMPERATURE: 98.5 F | WEIGHT: 151.2 LBS | RESPIRATION RATE: 12 BRPM

## 2019-11-01 DIAGNOSIS — Z12.31 ENCOUNTER FOR SCREENING MAMMOGRAM FOR BREAST CANCER: ICD-10-CM

## 2019-11-01 DIAGNOSIS — J03.01 ACUTE RECURRENT STREPTOCOCCAL TONSILLITIS: ICD-10-CM

## 2019-11-01 DIAGNOSIS — J01.41 ACUTE RECURRENT PANSINUSITIS: Primary | ICD-10-CM

## 2019-11-01 PROCEDURE — 77067 SCR MAMMO BI INCL CAD: CPT

## 2019-11-01 PROCEDURE — 99214 OFFICE O/P EST MOD 30 MIN: CPT | Performed by: FAMILY MEDICINE

## 2019-11-01 RX ORDER — CODEINE PHOSPHATE AND GUAIFENESIN 10; 100 MG/5ML; MG/5ML
1-2 SOLUTION ORAL EVERY 4 HOURS PRN
Qty: 236 ML | Refills: 0 | Status: SHIPPED | OUTPATIENT
Start: 2019-11-01 | End: 2019-11-11

## 2019-11-01 RX ORDER — AZITHROMYCIN 250 MG/1
TABLET, FILM COATED ORAL
Qty: 6 TABLET | Refills: 1 | Status: SHIPPED | OUTPATIENT
Start: 2019-11-01 | End: 2020-10-05

## 2019-11-01 ASSESSMENT — MIFFLIN-ST. JEOR: SCORE: 1367.59

## 2019-11-04 ENCOUNTER — MYC REFILL (OUTPATIENT)
Dept: FAMILY MEDICINE | Facility: CLINIC | Age: 39
End: 2019-11-04

## 2019-11-04 DIAGNOSIS — R45.4 IRRITABILITY: ICD-10-CM

## 2019-11-04 DIAGNOSIS — J01.41 ACUTE RECURRENT PANSINUSITIS: ICD-10-CM

## 2019-11-04 DIAGNOSIS — J03.01 ACUTE RECURRENT STREPTOCOCCAL TONSILLITIS: ICD-10-CM

## 2019-11-04 DIAGNOSIS — L70.0 ACNE VULGARIS: ICD-10-CM

## 2019-11-04 DIAGNOSIS — G43.839 MENSTRUAL MIGRAINE, INTRACTABLE, WITHOUT STATUS MIGRAINOSUS: ICD-10-CM

## 2019-11-04 RX ORDER — AZITHROMYCIN 250 MG/1
TABLET, FILM COATED ORAL
Qty: 6 TABLET | Refills: 1 | Status: CANCELLED | OUTPATIENT
Start: 2019-11-04

## 2019-11-04 NOTE — TELEPHONE ENCOUNTER
"ESTARYLLA TABLETS 28S      Last Written Prescription Date:  5/14/19  Last Fill Quantity: 112,   # refills: 1  Last Office Visit: 11/1/19  Future Office visit:       Requested Prescriptions   Pending Prescriptions Disp Refills     ESTARYLLA 0.25-35 MG-MCG tablet [Pharmacy Med Name: ESTARYLLA TABLETS 28S] 112 tablet 0     Sig: TAKE 1 ACTIVE PILL DAILY FOR 12 WEEKS, SKIP PLACEBO PILLS       Contraceptives Protocol Passed - 11/4/2019  4:15 PM        Passed - Patient is not a current smoker if age is 35 or older        Passed - Recent (12 mo) or future (30 days) visit within the authorizing provider's specialty     Patient has had an office visit with the authorizing provider or a provider within the authorizing providers department within the previous 12 mos or has a future within next 30 days. See \"Patient Info\" tab in inbasket, or \"Choose Columns\" in Meds & Orders section of the refill encounter.              Passed - Medication is active on med list        Passed - No active pregnancy on record        Passed - No positive pregnancy test in past 12 months          "

## 2019-11-05 RX ORDER — NORGESTIMATE AND ETHINYL ESTRADIOL 0.25-0.035
KIT ORAL
Qty: 112 TABLET | Refills: 1 | Status: SHIPPED | OUTPATIENT
Start: 2019-11-05 | End: 2020-04-22

## 2019-11-05 NOTE — TELEPHONE ENCOUNTER
Prescription approved per [facility] [place] [place] Refill Protocol.  Jarred March RN

## 2019-12-18 DIAGNOSIS — F31.9 BIPOLAR DISORDER, UNSPECIFIED (H): Primary | ICD-10-CM

## 2019-12-18 LAB — LITHIUM SERPL-SCNC: 0.7 MMOL/L (ref 0.6–1.2)

## 2019-12-18 PROCEDURE — 80178 ASSAY OF LITHIUM: CPT | Performed by: PSYCHIATRY & NEUROLOGY

## 2019-12-18 PROCEDURE — 36415 COLL VENOUS BLD VENIPUNCTURE: CPT | Performed by: PSYCHIATRY & NEUROLOGY

## 2020-02-04 NOTE — PATIENT INSTRUCTIONS
If symptoms resolve with the zithromax and then recur on day 6-8 repeat the zithromax.  If symptoms do not improve or do not recur don't repeat the zithromax.  Take only one tab a day if repeating the pac.  Zithromax is an antibiotic and works against bacteria.  If it didn't work then you don't have a bacterial infection, you have a viral infection and zpac or any other antibiotic won't work.  Recommend rest, fluids and other supportive cares so your immune system can clear the viral infection.  If your illness is getting worse see MD.    Symptomatic therapy suggested: push fluids, rest, gargle warm salt water, use vaporizer or mist needed , use acetaminophen, ibuprofen, decongestant of choice as needed and Return office visit if symptoms persist or worsen. Call or return to clinic prn if these symptoms worsen or fail to improve as anticipated.     No

## 2020-02-17 ENCOUNTER — TELEPHONE (OUTPATIENT)
Dept: FAMILY MEDICINE | Facility: CLINIC | Age: 40
End: 2020-02-17

## 2020-02-17 NOTE — TELEPHONE ENCOUNTER
Prior Authorization Retail Medication Request    Medication/Dose:   ICD code (if different than what is on RX):  Acquired hypothyroidism [E03.9]  Previously Tried and Failed:    Rationale:  Pt States in [email or web address] message- Prior authorization needed to stay on brand name synthroid.  Generics have flared IBS symptoms, Endocrinologist from [facility] originally prescribed and recommends.    Insurance Name:  [employer]  Insurance ID:  Not provided  Phone: [phone]      Pharmacy Information (if different than what is on RX)  Name:  Not provided  Phone:  Not provided

## 2020-02-18 ENCOUNTER — MYC REFILL (OUTPATIENT)
Dept: FAMILY MEDICINE | Facility: CLINIC | Age: 40
End: 2020-02-18

## 2020-02-18 DIAGNOSIS — T75.3XXA MOTION SICKNESS, INITIAL ENCOUNTER: ICD-10-CM

## 2020-02-18 DIAGNOSIS — R11.0 NAUSEA: ICD-10-CM

## 2020-02-18 RX ORDER — SCOLOPAMINE TRANSDERMAL SYSTEM 1 MG/1
PATCH, EXTENDED RELEASE TRANSDERMAL
Qty: 3 PATCH | Refills: 1 | Status: SHIPPED | OUTPATIENT
Start: 2020-02-18 | End: 2022-10-11

## 2020-02-18 RX ORDER — ONDANSETRON 8 MG/1
8 TABLET, ORALLY DISINTEGRATING ORAL EVERY 8 HOURS PRN
Qty: 16 TABLET | Refills: 0 | Status: SHIPPED | OUTPATIENT
Start: 2020-02-18 | End: 2023-04-06

## 2020-02-18 NOTE — TELEPHONE ENCOUNTER
Routing refill request to provider for review/approval because:  Drug not on the [facility] refill protocol   Jarred March RN

## 2020-02-18 NOTE — TELEPHONE ENCOUNTER
Has not been filled recently and was reported not taking in 11/2019. Is she needing it for an upcoming trip?     Shireen

## 2020-02-19 NOTE — TELEPHONE ENCOUNTER
Central Prior Authorization Team   Phone: [phone]    PA Initiation    Medication: Synthroid ZABRINA  Insurance Company: [employer] - Phone [phone] Fax [phone]  Pharmacy Filling the Rx: [address]  Filling Pharmacy Phone: [phone]  Filling Pharmacy Fax: [phone]  Start Date: 2/19/2020

## 2020-02-20 NOTE — TELEPHONE ENCOUNTER
Prior Authorization Approval    Authorization Effective Date: 1/20/2020  Authorization Expiration Date: 2/18/2021  Medication: Synthroid ZABRINA-APPROVED  Approved Dose/Quantity:    Reference #:     Insurance Company: [employer] - Phone [phone] Fax [phone]  Expected CoPay:       CoPay Card Available:      Foundation Assistance Needed:    Which Pharmacy is filling the prescription (Not needed for infusion/clinic administered): [facility] - [address]  Pharmacy Notified: Yes  Patient Notified: Yes  **Instructed pharmacy to notify patient when script is ready to /ship.**

## 2020-02-22 DIAGNOSIS — L70.0 ACNE VULGARIS: ICD-10-CM

## 2020-02-24 RX ORDER — SPIRONOLACTONE 50 MG/1
TABLET, FILM COATED ORAL
Qty: 90 TABLET | Refills: 0 | Status: SHIPPED | OUTPATIENT
Start: 2020-02-24 | End: 2020-07-09

## 2020-02-24 NOTE — TELEPHONE ENCOUNTER
"SPIRONOLACTONE 50MG TABLETS      Last Written Prescription Date:  4/30/19  Last Fill Quantity: 90,   # refills: 3  Last Office Visit: 11/1/19  Future Office visit:       Requested Prescriptions   Pending Prescriptions Disp Refills     spironolactone (ALDACTONE) 50 MG tablet [Pharmacy Med Name: SPIRONOLACTONE 50MG TABLETS] 90 tablet 3     Sig: TAKE 1 TABLET BY MOUTH EVERY DAY       Diuretics (Including Combos) Protocol Passed - 2/22/2020  9:27 AM        Passed - Blood pressure under 140/90 in past 12 months     BP Readings from Last 3 Encounters:   11/01/19 118/84   10/21/19 112/68   10/08/19 122/86                 Passed - Recent (12 mo) or future (30 days) visit within the authorizing provider's specialty     Patient has had an office visit with the authorizing provider or a provider within the authorizing providers department within the previous 12 mos or has a future within next 30 days. See \"Patient Info\" tab in inbasket, or \"Choose Columns\" in Meds & Orders section of the refill encounter.              Passed - Medication is active on med list        Passed - Patient is age 18 or older        Passed - No active pregancy on record        Passed - Normal serum creatinine on file in past 12 months     Recent Labs   Lab Test 03/26/19  0744   CR 0.87              Passed - Normal serum potassium on file in past 12 months     Recent Labs   Lab Test 03/26/19  0744   POTASSIUM 4.5                    Passed - Normal serum sodium on file in past 12 months     Recent Labs   Lab Test 03/26/19  0744                 Passed - No positive pregnancy test in past 12 months          "

## 2020-04-21 DIAGNOSIS — G43.839 MENSTRUAL MIGRAINE, INTRACTABLE, WITHOUT STATUS MIGRAINOSUS: ICD-10-CM

## 2020-04-21 DIAGNOSIS — L70.0 ACNE VULGARIS: ICD-10-CM

## 2020-04-21 DIAGNOSIS — R45.4 IRRITABILITY: ICD-10-CM

## 2020-04-22 RX ORDER — NORGESTIMATE AND ETHINYL ESTRADIOL 0.25-0.035
KIT ORAL
Qty: 112 TABLET | Refills: 1 | Status: SHIPPED | OUTPATIENT
Start: 2020-04-22 | End: 2020-10-05

## 2020-04-23 ENCOUNTER — E-VISIT (OUTPATIENT)
Dept: FAMILY MEDICINE | Facility: CLINIC | Age: 40
End: 2020-04-23
Payer: COMMERCIAL

## 2020-04-23 DIAGNOSIS — J31.2 CHRONIC SORE THROAT: Primary | ICD-10-CM

## 2020-04-23 PROCEDURE — 99421 OL DIG E/M SVC 5-10 MIN: CPT | Performed by: FAMILY MEDICINE

## 2020-05-05 ENCOUNTER — E-VISIT (OUTPATIENT)
Dept: FAMILY MEDICINE | Facility: CLINIC | Age: 40
End: 2020-05-05
Payer: COMMERCIAL

## 2020-05-05 DIAGNOSIS — M54.50 LUMBAR PAIN: Primary | ICD-10-CM

## 2020-05-05 PROCEDURE — 99421 OL DIG E/M SVC 5-10 MIN: CPT | Performed by: FAMILY MEDICINE

## 2020-05-05 RX ORDER — CYCLOBENZAPRINE HCL 10 MG
10 TABLET ORAL 2 TIMES DAILY PRN
Qty: 20 TABLET | Refills: 0 | Status: SHIPPED | OUTPATIENT
Start: 2020-05-05 | End: 2020-06-09

## 2020-05-05 NOTE — PATIENT INSTRUCTIONS
Caring for Your Back    You are not alone.    Low back pain is very common. Nearly half of all adults will have low back pain in any given year. The good news is that back pain is rarely a danger to your health. Most people can manage their back pain on their own. About half of people start feeling better within two weeks. In 9 out of 10 cases, low back pain goes away or no longer limits daily activity within 6 weeks.     Your outlook is good!     Your symptoms tell us that your low back pain is most likely not a danger to you. Most of the time we will not know the exact cause of low back pain, even if you see a doctor or have an MRI. However, treatment can still work without knowing the cause of the pain. Less than 1 in 100 people need surgery for their back pain.     What can I do about my low back pain?     There are three basic things you can do to ease low back pain and help it go away.     Use heat or cold packs.    Take medicine as directed.    Use positions, movements and exercises.    Using heat or cold packs:    Try cold packs or gentle heat to ease your pain.  Use whichever gives you the most relief. Apply the cold pack or heat for 15 minutes at a time, as often as needed.    Taking medicine:    If taking over-the-counter medicine:    Take ibuprofen (Advil, Motrin) 600 mg three times a day as needed for pain.  OR    Take Aleve (naproxen) 220 to 440 mg two times a day as needed for pain. If your doctor prescribed a muscle relaxant (cyclobenzaprine 10 mg.):    Take   to 1 tablet at bedtime.    Do not drive when taking this medicine. This drug may make you sleepy.     Using positions, movements and exercises:    Research tells us that moving your joints and muscles can help you recover from back pain. Such activity should be simple and gentle. Use the positions below as well as walking to help relieve your pain. Try taking a short walk every 3 to 4 hours during the day. Walk for a few minutes inside your  home or take longer walks outside, on a treadmill or at a mall. Slowly increase the amount of time you walk. Expect discomfort when you begin, but it should lessen as your back starts to heal. When your back feels better, walk daily to keep your back and body healthy.    Finding a position that is comfortable:    When your back pain is new, certain positions will ease your pain. Gently try each of the positions below until you find one that is helpful. Once you find a position of comfort, use it as often as you like when you are resting. You will recover faster if you combine rest with activity.    * Lie on your back with your legs bent. You can do this by placing a pillow under your knees or lie on the floor and rest your lower legs on the seat of a chair.  * Lie on your side with your knees bent and place a pillow between your knees.    Lie on your stomach over pillows.       When should I call my doctor?    Your back pain should improve over the first couple of weeks. As it improves, you should be able to return to your normal activities.  But call your doctor if:      You have a sudden change in your ability to control your bladder or bowels.    You begin to feel tingling in your groin or legs.    The pain spreads down your leg and into your foot.    Your toes, feet or leg muscles begin to feel weak.    You feel generally unwell or sick.    Your pain gets worse.    If you are deaf or hard of hearing, please let us know. We provide many free services including sign language interpreters, oral interpreters, TTYs, telephone amplifiers, note takers and written materials.    For informational purposes only. Not to replace the advice of your health care provider. Copyright   2013 [facility]. All rights reserved. [facility] [id number] - 04/13.    Mini Relaxation Exercises  Source www.tobaccofreeu.org    Mini relaxation exercises are focused breathing techniques that help reduce  anxiety and tension  immediately. You can do them with your eyes open or  closed. You can do them any place, at any time; no one will know that you are  doing them.    Good Times to Do a  Mini     Before taking an exam    While at the computer lab waiting for your document to print    While waiting in line    When someone says something that bothers you    While waiting for the announcement of a grade    While waiting for a phone call    In the dentist s chair    When you feel overwhelmed by what you need to accomplish in the near  future    When in pain    When you want to     Mini Version 1- Breath Focused  Position yourself in a supportive comfortable chair or lying in a position that is least painful. (Often this is on your back with pillows under your knees)    a. Count very slowly to yourself from ten down to zero, one number for  each breath. Thus, with the first diaphragmatic breath, you say  ten  to  yourself, with the next breath, you say  nine , etc. If you start feeling  light-headed or dizzy, slow down the counting. When you get to  zero ,  see how you are feeling. If you are feeling better, great! If not, try to  do it again.    b. As you inhale, count very slowly up to four; as you exhale, count slowly  back down to one. Thus, as you inhale, you say to yourself  one, two,  three, four , as you exhale, you say to yourself  four, three, two, one .  Do this several times.    c. After each inhalation, pause for a few seconds; after you exhale, pause  again for a few seconds. Do this for several breaths.    Mini Version 2- Physical Focused  a. Massage your forehead, jaw, back of neck, and shoulders  b. Stretch and yawn  c. Walk counting four paces as you breathe in and four paces as you  breathe out  d. Coordinate your breath with any activity, for example, writing, jogging,  drawing, lifting weights, walking, etc.    Mini Version 3- Imagery Focused  Position yourself in a supportive comfortable chair or lying in a position  that is least painful. (Often this is on your back with pillows under your knees)    a. Briefly picture yourself in a place you find relaxing  b. Contemplate a picture of a natural scene  c. Imagine the characteristics of one of the following or any other object  which portrays characteristics you find calming or supportive in the  moment:    Tree (strong, rooted, expansive)    Mountain (timeless, strong, stable)    Waves (fluid, limitless)    Sun (radiant, warm)    Wind (free)    Mini Version 4- Mindfulness Focused  a. Look out the window for a few moments  b. Notice something new  c. Listen. What is the most distant sound you hear? The next distant?  d. Appreciate the sensation of being in the situation, the feel, smell, sight of  certain objects  e. Focus on being exactly where you are, keeping your thoughts from flowing  into the future or past

## 2020-05-13 ENCOUNTER — VIRTUAL VISIT (OUTPATIENT)
Dept: PHYSICAL THERAPY | Facility: CLINIC | Age: 40
End: 2020-05-13
Attending: FAMILY MEDICINE
Payer: COMMERCIAL

## 2020-05-13 DIAGNOSIS — M54.50 LUMBAR PAIN: ICD-10-CM

## 2020-05-13 DIAGNOSIS — M54.50 ACUTE BILATERAL LOW BACK PAIN WITHOUT SCIATICA: ICD-10-CM

## 2020-05-13 PROCEDURE — 97110 THERAPEUTIC EXERCISES: CPT | Mod: 95 | Performed by: PHYSICAL THERAPIST

## 2020-05-13 PROCEDURE — 97530 THERAPEUTIC ACTIVITIES: CPT | Mod: 95 | Performed by: PHYSICAL THERAPIST

## 2020-05-13 PROCEDURE — 97161 PT EVAL LOW COMPLEX 20 MIN: CPT | Mod: 95 | Performed by: PHYSICAL THERAPIST

## 2020-05-13 NOTE — PROGRESS NOTES
"Physical Therapy Virtual Initial Visit      The patient has been notified of following:     \"This virtual visit will be conducted between you and your provider. We have found that certain health care needs can be provided without the need for physical presence.  This service lets us provide the care you need with a virtual visit.\"    Due to external, as well as internal [facility] management of the COVID-19 Virus, Kesha Carter was not seen in our clinic.  As a substitution, we implemented a virtual visit to manage this patient's condition utilizing the PTRx virtual visit platform via the patient s existing code.  The provider, Bibi Pierre, reviewed the patient's chart, PTRx prescription, and spoke with the patient to determine the following telemedicine visit is appropriate and effective for the patient's care.    The following type of visit was completed:   Video Visit:  The [employer] platform uses a synchronous HIPAA compliant video stream for this patient encounter.         Subjective:  The history is provided by the patient. No  was used.   Patient Health History  Kesha Carter being seen for LB, buttock pain.     Date of Onset: March 2020.   HPI: Documentation: car trip and prolonged sitting.   Pain is reported as 5/10 (increases to 8/10) on pain scale.  General health as reported by patient is good.  Pertinent medical history includes: thyroid problems and other (sleep difficulty and bipolar).   Red flags:  None as reported by patient.  Medical allergies: see epic.   Surgeries include:  Orthopedic surgery (L ACL).    Current medications:  Sleep medication, thyroid medication, other and muscle relaxants (for bipolar).       Primary job tasks include:  Prolonged sitting (12 hrs per day).                  Therapist Generated HPI Evaluation  Problem details: Date of MD order for this episode was 5-5-20. Kendra states she has been having LBP since about March. She drove to [place] " for a trip and then when returned home also started working out of her home(12 hrs at a time). Kendra has had infrequent bouts of back pain in the past. The shooting pain radiates to the R and L buttock, switching off and does not go further down the leg. The butt is numb. Sleeping on the floor, bed unable to sleep due to pain. Back feels like it will go out  She does report pain daily. She did get a sit to stand desk which has helped.  She also reports some vertigo  She is able to walk a little more but is not doing her normal exercise routine-running, cardio HIT classes. .         Type of problem:  Lumbar (B buttocks).    This is a new condition.  Condition occurred with:  Other reason (from  long car trip and working from home).  Where condition occurred: other (car and home).  Patient reports pain:  Lumbar spine left and lumbar spine right.  Pain is described as sharp and aching (pinch) and is constant.  Pain radiates to:  Gluteals right and gluteals left. Pain is the same all the time.  Since onset symptoms are gradually worsening.  Associated symptoms:  Numbness, tingling, loss of motion/stiffness and loss of strength. Symptoms are exacerbated by bending, sitting, lying down, lifting, carrying and certain positions  and relieved by heat and ice (walking  standing).  Special tests included:  MRI (several yrs ago and was normal).    Restrictions due to condition include:  Working in normal job without restrictions (from home).  Barriers include:  None as reported by patient.                  Objective:    Standing Alignment:                  General deviations alignment: overall standing posture is good.      Flexibility/Screens:       Lower Extremity:  Decreased left lower extremity flexibility:Piriformis and Hamstrings    Decreased right lower extremity flexibility:  Piriformis and Hamstrings             Lumbar/SI Evaluation  ROM:    AROM Lumbar:   Flexion:            75% with pinch in LB  Ext:                     25% pain LB   Side Bend:        Left:  75% pinch LB    Right:  75% pinch LB  Rotation:           Left:  75% stiff    Right:  75% stiff  Side Glide:        Left:     Right:           Lumbar Myotomes:  Lumbar myotomes: able to raise heels 10x equal B l/e, reports no weakness in legs.                Lumbar Dermtomes:  Lumbar dermatomes: self check- increased tingling B lateral leg, otherwise equal to light touch.                Neural Tension/Mobility:      Left side:SLR  negative.     Right side:   SLR  negative.   Lumbar Palpation:  Palpation (lumbar): self=pain R LS.                                                         [place] Lumbar Evaluation        Test Movements:  FIS: During: produces  After: no effect    Repeat FIS: During: produces  After: centralizing  Mechanical Response: no effect  EIS: During: produces  After: no worse    Repeat EIS: During: produces  After: peripheralizing  Mechanical Response: no effect    EIL: During: produces  After: better    Repeat EIL: During: produces  After: centralizing  Mechanical Response: IncROM                                               ROS    PTRx Content from today's visit:  Exercise Name: Virtual Visit Tips for Patients  Exercise Name: Prone On Elbows, Sets: 1 - Reps: 1-3min - Sessions: 4-6x/day, Notes: or standing extension if cannot do this  Exercise Name: Prone Press Ups, Sets: 1 - Reps: 8-10 - Sessions: 4-6, Notes: or standing extension if unable to do this  follows prone on elbows exercise  Exercise Name: Standing Extension, Sets: 1 - Reps: 5, 3 sec hold - Sessions: 4, Notes: frequently  to interrupt sitting or after bending during your day.   Do not look up with your head  Exercise Name: Posture Correction with Lumbar Roll  Exercise Name: Sitting Posture at Computer  Exercise Name: 90/90 Position, Notes: usually is comfortable as a rest position, or on side with pillow between knees  Exercise Name: Body Mechanics - Waiters Enoch, Notes: body mechanics  reminder  Exercise Name: Body Mechanics - Golfers Lift, Notes: body mechanics reminder  Exercise Name: Body Mechanics - Full Squat, Notes: body mechanics reminder  Exercise Name: Body Mechanics - Half Kneel Lift  Exercise Name: Supine to Sit Transfer    Assessment/Plan:     Patient is a 39 year old female with lumbar complaints.    Patient has the following significant findings with corresponding treatment plan.                Diagnosis 1:  LBP  Pain -  self management, education, directional preference exercise and home program  Decreased ROM/flexibility - therapeutic exercise and home program  Decreased strength(core) - therapeutic exercise, therapeutic activities and home program  Impaired muscle performance - neuro re-education and home program  Decreased function - therapeutic activities and home program    Therapy Evaluation Codes:   1) History comprised of:   Personal factors that impact the plan of care:      Profession and Time since onset of symptoms.    Comorbidity factors that impact the plan of care are:      None.     Medications impacting care: Muscle relaxant.  2) Examination of Body Systems comprised of:   Body structures and functions that impact the plan of care:      Lumbar spine.   Activity limitations that impact the plan of care are:      Bathing, Bending, Cooking, Driving, Dressing, Lifting, Sitting, Squatting/kneeling, Working, Sleeping and Laying down.  3) Clinical presentation characteristics are:   Stable/Uncomplicated.  4) Decision-Making    Low complexity using standardized patient assessment instrument and/or measureable assessment of functional outcome.  Cumulative Therapy Evaluation is: Low complexity.    Previous and current functional limitations:  (See Goal Flow Sheet for this information)    Short term and Long term goals: (See Goal Flow Sheet for this information)     Communication ability:  Patient appears to be able to clearly communicate and understand verbal and written  communication and follow directions correctly.  Treatment Explanation - The following has been discussed with the patient:   RX ordered/plan of care  Anticipated outcomes  Possible risks and side effects  This patient would benefit from PT intervention to resume normal activities.   Rehab potential is good.    Frequency:  2 X a month, once daily  Duration:  for 2-3 months  Discharge Plan:  Achieve all LTG.  Independent in home treatment program.  Reach maximal therapeutic benefit.    Please refer to the daily flowsheet for treatment today, total treatment time and time spent performing 1:1 timed codes.       Virtual visit contact time    Time of service began: 1230 PM  Time of service ended: 112 PM  Total Time for set up, visit, and documentation: 50 minutes    Payor: MEDICA / Plan: MEDICA CHOICE / Product Type: Indemnity /     Procedure Code/s   Therapeutic Exercise (37995): 12 minutes  Therapeutic Activities (81643): 10 minutes  Evaluation: 20 minutes    I have reviewed the note as documented above.  This accurately captures the substance of my conversation with the patient.  Provider location: home (City/State)  Patient location: home    ___________________________________________________

## 2020-05-27 ENCOUNTER — VIRTUAL VISIT (OUTPATIENT)
Dept: PHYSICAL THERAPY | Facility: CLINIC | Age: 40
End: 2020-05-27
Payer: COMMERCIAL

## 2020-05-27 DIAGNOSIS — M54.50 ACUTE BILATERAL LOW BACK PAIN WITHOUT SCIATICA: ICD-10-CM

## 2020-05-27 PROCEDURE — 97110 THERAPEUTIC EXERCISES: CPT | Mod: 95 | Performed by: PHYSICAL THERAPIST

## 2020-05-27 PROCEDURE — 97112 NEUROMUSCULAR REEDUCATION: CPT | Mod: 95 | Performed by: PHYSICAL THERAPIST

## 2020-05-27 NOTE — PROGRESS NOTES
"Physical Therapy Virtual Follow Up Visit      The patient has been notified of following:     \"This virtual visit will be conducted between you and your provider. We have found that certain health care needs can be provided without the need for physical presence.  This service lets us provide the care you need with a virtual visit.\"    Due to external, as well as internal [facility] management of the COVID-19 Virus, Kesha Carter was not seen in our clinic.  As a substitution, we implemented a virtual visit to manage this patient's condition utilizing the [employer] virtual visit platform via the patient s existing code.  The provider, Bibi Pierre, reviewed the patient's chart, PTRx prescription, and spoke with the patient to determine the following telemedicine visit is appropriate and effective for the patient's care.    The following type of visit was completed:   Video Visit:  The [employer] platform uses a synchronous HIPAA compliant video stream for this patient encounter.        S: Kesha Carter is a 39 year old female. Connected virtually on the [employer] platform to discuss their condition/progress. Kendra states she is very tight but is feeling better. She states she has been able to be more active. States her butt feels very tight, varies with the side day to day. Does not have as much numbness in the buttocks. Sleep has been \"on and off\" though better, has been using 1/2 mm relaxer at night. Using ice in the evening as well which is helpful.   Current pain level: ave daily pain 4-5/10 \"better\"      O: Patient demonstrated new exercises in clinic with cues for TrA activation for abdominal and glute activation for bridge   Reviewed body mechanics and frequent interruption of sitting at work    PTRx Content from today's visit:    Exercise Name: Supine Abdominal Exercise #5 (Arm and Leg Extension), Sets: 1 - Reps: 10-25 - Sessions: 3x/wk, Notes: slow and controlled  Exercise Name: Bridging #2B Straight " leg, Sets: 1 - Reps: 10-25 each leg - Sessions: 3-5x/wk, Notes: slow and controlled.  Keep pelvis level  Exercise Name: Supine Hamstring Stretch, Sets: 1 - Reps: 2, 30 sec holds - Sessions: 1-2, Notes: can also do sitting or standing(demonstrated)    Exercise Name: Pretzel Stretch, Sets: 1 set - Reps: 2 reps each side, 15-30 sec hold - Sessions: 2  Exercise Name: Piriformis Stretch Above 90 Degress Supine, Sets: 1 set - Reps: 2, 15-30 sec hold - Sessions: 2  Exercise Name: Prone On Elbows, Sets: 1 - Reps: 1-3min - Sessions: 2, Notes: or standing extension if cannot do this  Exercise Name: Prone Press Ups, Sets: 1 - Reps: 8-10 - Sessions: 2, Notes: or standing extension if unable to do this  follows prone on elbows exercise  Exercise Name: Standing Extension, Sets: 1 - Reps: 5, 3 sec hold - Sessions: 4, Notes: frequently  to interrupt sitting or after bending during your day.   Do not look up with your head    A:   Patient is ready to progress to more complex exercises.  Response to therapy has shown an improvement in  pain level and function      P: Patient will continue with the exercise program assigned on their PTRx code and will add the following measures to manage their pain/condition: continue with ice and using good body mechanics(reviewed)     Current treatment program is being advanced to more complex exercises.  The following procedures have been added:  stretching, strengthening and neuromuscular re-education      Virtual visit contact time    Time of service began: 1000 AM  Time of service ended: 1025 AM  Total Time for set up, visit, and documentation: 40 minutes    Payor: MEDICA / Plan: MEDICA CHOICE / Product Type: Indemnity /   .  Procedure Code/s   Therapeutic Exercise (61361): 15 minutes  Neuromuscular Re-education (68861): 10 minutes    I have reviewed the note as documented above.  This accurately captures the substance of my conversation with the patient.  Provider location: [place]  (City/State)  Patient location: home

## 2020-06-05 DIAGNOSIS — M54.50 LUMBAR PAIN: ICD-10-CM

## 2020-06-05 NOTE — TELEPHONE ENCOUNTER
Routing refill request to provider for review/approval because:  Drug not on the [facility] refill protocol   Darline De La Garza RN

## 2020-06-09 RX ORDER — CYCLOBENZAPRINE HCL 10 MG
TABLET ORAL
Qty: 20 TABLET | Refills: 0 | Status: SHIPPED | OUTPATIENT
Start: 2020-06-09 | End: 2021-10-05

## 2020-07-09 DIAGNOSIS — L70.0 ACNE VULGARIS: ICD-10-CM

## 2020-07-09 RX ORDER — SPIRONOLACTONE 50 MG/1
TABLET, FILM COATED ORAL
Qty: 90 TABLET | Refills: 0 | Status: SHIPPED | OUTPATIENT
Start: 2020-07-09 | End: 2020-09-11

## 2020-08-19 ENCOUNTER — TELEPHONE (OUTPATIENT)
Dept: FAMILY MEDICINE | Facility: CLINIC | Age: 40
End: 2020-08-19

## 2020-08-19 DIAGNOSIS — E03.9 ACQUIRED HYPOTHYROIDISM: ICD-10-CM

## 2020-08-20 RX ORDER — LEVOTHYROXINE SODIUM 100 UG/1
TABLET ORAL
Qty: 90 TABLET | Refills: 0 | Status: SHIPPED | OUTPATIENT
Start: 2020-08-20 | End: 2020-10-05

## 2020-08-20 NOTE — TELEPHONE ENCOUNTER
Routing refill request to provider for review/approval because:  Drug interaction warning  Jarred March, RN

## 2020-08-21 NOTE — TELEPHONE ENCOUNTER
Please let her know that she is due for labs. Future orders are in.     I will fill 90 days.   Could schedule a physical and do labs as well.      MEGHNA Sanon MD ( formerly [facility])

## 2020-09-11 DIAGNOSIS — L70.0 ACNE VULGARIS: ICD-10-CM

## 2020-09-11 RX ORDER — SPIRONOLACTONE 50 MG/1
TABLET, FILM COATED ORAL
Qty: 90 TABLET | Refills: 0 | Status: SHIPPED | OUTPATIENT
Start: 2020-09-11 | End: 2020-10-05

## 2020-09-11 NOTE — TELEPHONE ENCOUNTER
Medication is being filled for 1 time refill only due to:  PAtient has 10/5/20 appt with Dr. Isabela March, RN

## 2020-10-05 ENCOUNTER — OFFICE VISIT (OUTPATIENT)
Dept: FAMILY MEDICINE | Facility: CLINIC | Age: 40
End: 2020-10-05
Payer: COMMERCIAL

## 2020-10-05 VITALS
OXYGEN SATURATION: 99 % | SYSTOLIC BLOOD PRESSURE: 138 MMHG | WEIGHT: 164 LBS | HEART RATE: 68 BPM | DIASTOLIC BLOOD PRESSURE: 84 MMHG | TEMPERATURE: 98.6 F | HEIGHT: 66 IN | BODY MASS INDEX: 26.36 KG/M2

## 2020-10-05 DIAGNOSIS — M54.59 MECHANICAL LOW BACK PAIN: ICD-10-CM

## 2020-10-05 DIAGNOSIS — R45.4 IRRITABILITY: ICD-10-CM

## 2020-10-05 DIAGNOSIS — Z12.31 ENCOUNTER FOR SCREENING MAMMOGRAM FOR BREAST CANCER: ICD-10-CM

## 2020-10-05 DIAGNOSIS — Z23 NEED FOR INFLUENZA VACCINATION: ICD-10-CM

## 2020-10-05 DIAGNOSIS — M54.50 LUMBAR PAIN: ICD-10-CM

## 2020-10-05 DIAGNOSIS — G43.839 MENSTRUAL MIGRAINE, INTRACTABLE, WITHOUT STATUS MIGRAINOSUS: ICD-10-CM

## 2020-10-05 DIAGNOSIS — E03.9 ACQUIRED HYPOTHYROIDISM: ICD-10-CM

## 2020-10-05 DIAGNOSIS — L70.0 ACNE VULGARIS: ICD-10-CM

## 2020-10-05 DIAGNOSIS — Z00.00 ROUTINE GENERAL MEDICAL EXAMINATION AT A HEALTH CARE FACILITY: Primary | ICD-10-CM

## 2020-10-05 PROBLEM — F31.9 BIPOLAR I DISORDER (H): Status: RESOLVED | Noted: 2019-07-19 | Resolved: 2020-10-05

## 2020-10-05 PROCEDURE — 99396 PREV VISIT EST AGE 40-64: CPT | Mod: 25 | Performed by: FAMILY MEDICINE

## 2020-10-05 PROCEDURE — 90471 IMMUNIZATION ADMIN: CPT | Performed by: FAMILY MEDICINE

## 2020-10-05 PROCEDURE — 90686 IIV4 VACC NO PRSV 0.5 ML IM: CPT | Performed by: FAMILY MEDICINE

## 2020-10-05 PROCEDURE — 99213 OFFICE O/P EST LOW 20 MIN: CPT | Mod: 25 | Performed by: FAMILY MEDICINE

## 2020-10-05 RX ORDER — CYCLOBENZAPRINE HCL 5 MG
5-10 TABLET ORAL
Qty: 60 TABLET | Refills: 3 | Status: SHIPPED | OUTPATIENT
Start: 2020-10-05 | End: 2021-02-11

## 2020-10-05 RX ORDER — LEVOTHYROXINE SODIUM 100 UG/1
TABLET ORAL
Qty: 90 TABLET | Refills: 3 | Status: SHIPPED | OUTPATIENT
Start: 2020-10-05 | End: 2020-10-12 | Stop reason: DRUGHIGH

## 2020-10-05 RX ORDER — CYCLOBENZAPRINE HCL 10 MG
TABLET ORAL
Qty: 20 TABLET | Refills: 0 | Status: CANCELLED | OUTPATIENT
Start: 2020-10-05

## 2020-10-05 RX ORDER — NORGESTIMATE AND ETHINYL ESTRADIOL 0.25-0.035
KIT ORAL
Qty: 112 TABLET | Refills: 3 | Status: SHIPPED | OUTPATIENT
Start: 2020-10-05 | End: 2021-10-05

## 2020-10-05 RX ORDER — SPIRONOLACTONE 50 MG/1
50 TABLET, FILM COATED ORAL DAILY
Qty: 90 TABLET | Refills: 1 | Status: SHIPPED | OUTPATIENT
Start: 2020-10-05 | End: 2021-09-17

## 2020-10-05 ASSESSMENT — MIFFLIN-ST. JEOR: SCORE: 1425.65

## 2020-10-05 NOTE — PROGRESS NOTES
SUBJECTIVE:   CC: Kesha Carter is an 39 year old woman who presents for preventive health visit.     Patient has been advised of split billing requirements and indicates understanding: Yes  Healthy Habits:    Do you get at least three servings of calcium containing foods daily (dairy, green leafy vegetables, etc.)? yes    Amount of exercise or daily activities, outside of work: 4-5 day(s) per week    Problems taking medications regularly No    Medication side effects: No    Have you had an eye exam in the past two years? yes    Do you see a dentist twice per year? yes    Do you have sleep apnea, excessive snoring or daytime drowsiness?no        Today's PHQ-2 Score:   PHQ-2 (  [employer]) 2019   Q1: Little interest or pleasure in doing things 0 0   Q2: Feeling down, depressed or hopeless 0 0   PHQ-2 Score 0 0       Abuse: Current or Past(Physical, Sexual or Emotional)- No  Do you feel safe in your environment? Yes        Social History     Tobacco Use     Smoking status: Former Smoker     Types: Cigarettes     Quit date: 2016     Years since quittin.1     Smokeless tobacco: Never Used   Substance Use Topics     Alcohol use: Yes     Comment: Once a month      If you drink alcohol do you typically have >3 drinks per day or >7 drinks per week? No                     Reviewed orders with patient.  Reviewed health maintenance and updated orders accordingly - Yes  Labs reviewed in EPIC  She has no family history   Mammogram Screening: Patient under age 50, mutual decision reflected in health maintenance.      Pertinent mammograms are reviewed under the imaging tab.  History of abnormal Pap smear: NO - age 30-65 PAP every 5 years with negative HPV co-testing recommended  PAP / HPV Latest Ref Rng & Units 2019   PAP - NIL NIL NIL   HPV 16 DNA NEG:Negative Negative Negative -   HPV 18 DNA NEG:Negative Negative Negative -   OTHER HR HPV NEG:Negative Negative Negative -  "    Reviewed and updated as needed this visit by clinical staff  Tobacco  Allergies  Meds   Med Hx  Surg Hx  Fam Hx  Soc Hx        Reviewed and updated as needed this visit by Provider                She has gained some weight last tsh 7/25 /19  She wakes up in the morning with stiff back lasts for a while this wakes her up in the middle of the night  At times she has done physical therapy and the back is ok during the day just pain ful with waking up.  No numbenss weakness or other symptoms. She was able to run 5 miles last night without pain      ROS:  CONSTITUTIONAL: NEGATIVE for fever, chills, change in weight  INTEGUMENTARU/SKIN: NEGATIVE for worrisome rashes, moles or lesions  EYES: NEGATIVE for vision changes or irritation  ENT: NEGATIVE for ear, mouth and throat problems  RESP: NEGATIVE for significant cough or SOB  BREAST: NEGATIVE for masses, tenderness or discharge  CV: NEGATIVE for chest pain, palpitations or peripheral edema  GI: NEGATIVE for nausea, abdominal pain, heartburn, or change in bowel habits  : NEGATIVE for unusual urinary or vaginal symptoms. Periods are regular.  MUSCULOSKELETAL: NEGATIVE for significant arthralgias or myalgia  NEURO: NEGATIVE for weakness, dizziness or paresthesias  PSYCHIATRIC: NEGATIVE for changes in mood or affect    OBJECTIVE:   /84   Pulse 68   Temp 98.6  F (37  C) (Tympanic)   Ht 1.676 m (5' 6\")   Wt 74.4 kg (164 lb)   SpO2 99%   BMI 26.47 kg/m    EXAM:  GENERAL: healthy, alert and no distress  EYES: Eyes grossly normal to inspection, PERRL and conjunctivae and sclerae normal  HENT: ear canals and TM's normal, nose and mouth without ulcers or lesions  NECK: no adenopathy, no asymmetry, masses, or scars and thyroid normal to palpation  RESP: lungs clear to auscultation - no rales, rhonchi or wheezes  BREAST: normal without masses, tenderness or nipple discharge and no palpable axillary masses or adenopathy  CV: regular rate and rhythm, normal S1 " S2, no S3 or S4, no murmur, click or rub, no peripheral edema and peripheral pulses strong  ABDOMEN: soft, nontender, no hepatosplenomegaly, no masses and bowel sounds normal  MS: no gross musculoskeletal defects noted, no edema  SKIN: no suspicious lesions or rashes  BACK: no CVA tenderness, no paralumbar tenderness    Diagnostic Test Results:  Labs reviewed in Epic    ASSESSMENT/PLAN:   1. Routine general medical examination at a health care facilit    2. Acne vulgaris  Doing well has upcoming lab appointment   - spironolactone (ALDACTONE) 50 MG tablet; Take 1 tablet (50 mg) by mouth daily  Dispense: 90 tablet; Refill: 1  - norgestimate-ethinyl estradiol (ESTARYLLA) 0.25-35 MG-MCG tablet; TAKE 1 ACTIVE PILL DAILY FOR 12 WEEKS, SKIP PLACEBO PILLS  Dispense: 112 tablet; Refill: 3    3. Acquired hypothyroidism  Will adjust medications based on labs  - levothyroxine (SYNTHROID) 100 MCG tablet; TAKE 1 TABLET(100 MCG) BY MOUTH DAILY  Dispense: 90 tablet; Refill: 3    4. Encounter for screening mammogram for breast cancer  Due end of nov no family history   - *MA Screening Digital Bilateral; Future    5. Need for influenza vaccination    - INFLUENZA VACCINE IM > 6 MONTHS VALENT IIV4 [87282]  - ADMIN 1st VACCINE    6. Irritability  Doing well on this   - norgestimate-ethinyl estradiol (ESTARYLLA) 0.25-35 MG-MCG tablet; TAKE 1 ACTIVE PILL DAILY FOR 12 WEEKS, SKIP PLACEBO PILLS  Dispense: 112 tablet; Refill: 3    7. Menstrual migraine, intractable, without status migrainosus    - norgestimate-ethinyl estradiol (ESTARYLLA) 0.25-35 MG-MCG tablet; TAKE 1 ACTIVE PILL DAILY FOR 12 WEEKS, SKIP PLACEBO PILLS  Dispense: 112 tablet; Refill: 3    8. Lumbar pain      9. Mechanical low back pain  Will have her try this just at night   - cyclobenzaprine (FLEXERIL) 5 MG tablet; Take 1-2 tablets (5-10 mg) by mouth nightly as needed for muscle spasms  Dispense: 60 tablet; Refill: 3    Patient has been advised of split billing requirements  "and indicates understanding: Yes  COUNSELING:   Reviewed preventive health counseling, as reflected in patient instructions       Regular exercise       Immunizations    Vaccinated for: Influenza          Estimated body mass index is 26.47 kg/m  as calculated from the following:    Height as of this encounter: 1.676 m (5' 6\").    Weight as of this encounter: 74.4 kg (164 lb).        She reports that she quit smoking about 4 years ago. Her smoking use included cigarettes. She has never used smokeless tobacco.      Counseling Resources:  ATP IV Guidelines  Pooled Cohorts Equation Calculator  Breast Cancer Risk Calculator  BRCA-Related Cancer Risk Assessment: FHS-7 Tool  FRAX Risk Assessment  ICSI Preventive Guidelines  Dietary Guidelines for Americans, 2010  USDA's MyPlate  ASA Prophylaxis  Lung CA Screening    Pia Mar MD  [facility]  "

## 2020-10-08 DIAGNOSIS — F31.9 BIPOLAR DISORDER, UNSPECIFIED (H): Primary | ICD-10-CM

## 2020-10-08 DIAGNOSIS — E03.9 ACQUIRED HYPOTHYROIDISM: ICD-10-CM

## 2020-10-08 LAB
ANION GAP SERPL CALCULATED.3IONS-SCNC: 3 MMOL/L (ref 3–14)
BUN SERPL-MCNC: 12 MG/DL (ref 7–30)
CALCIUM SERPL-MCNC: 8.6 MG/DL (ref 8.5–10.1)
CHLORIDE SERPL-SCNC: 106 MMOL/L (ref 94–109)
CO2 SERPL-SCNC: 27 MMOL/L (ref 20–32)
CREAT SERPL-MCNC: 0.9 MG/DL (ref 0.52–1.04)
GFR SERPL CREATININE-BSD FRML MDRD: 79 ML/MIN/{1.73_M2}
GLUCOSE SERPL-MCNC: 76 MG/DL (ref 70–99)
LITHIUM SERPL-SCNC: 0.73 MMOL/L (ref 0.6–1.2)
POTASSIUM SERPL-SCNC: 4.1 MMOL/L (ref 3.4–5.3)
SODIUM SERPL-SCNC: 136 MMOL/L (ref 133–144)
TSH SERPL DL<=0.005 MIU/L-ACNC: 4.34 MU/L (ref 0.4–4)

## 2020-10-08 PROCEDURE — 84443 ASSAY THYROID STIM HORMONE: CPT | Performed by: PSYCHIATRY & NEUROLOGY

## 2020-10-08 PROCEDURE — 80048 BASIC METABOLIC PNL TOTAL CA: CPT | Performed by: PSYCHIATRY & NEUROLOGY

## 2020-10-08 PROCEDURE — 80178 ASSAY OF LITHIUM: CPT | Performed by: PSYCHIATRY & NEUROLOGY

## 2020-10-15 ENCOUNTER — TELEPHONE (OUTPATIENT)
Dept: FAMILY MEDICINE | Facility: CLINIC | Age: 40
End: 2020-10-15

## 2020-10-15 DIAGNOSIS — E03.9 ACQUIRED HYPOTHYROIDISM: ICD-10-CM

## 2020-10-15 RX ORDER — LEVOTHYROXINE SODIUM 112 UG/1
112 TABLET ORAL DAILY
Qty: 30 TABLET | Refills: 3 | Status: SHIPPED | OUTPATIENT
Start: 2020-10-15 | End: 2021-03-24

## 2020-10-15 NOTE — TELEPHONE ENCOUNTER
Call placed to patient.  Relayed message per Dr Mar.  Patient agrees with plan,will update if any concerns.  Darline De La Garza RN

## 2020-10-15 NOTE — TELEPHONE ENCOUNTER
Patient called and has questions about her thyroid medication and the increase in her dose.    Jada Pandey [facility]

## 2020-10-15 NOTE — TELEPHONE ENCOUNTER
I would suspect the change in brand. We only increased by 12mcg. I wll send in the brand name only due to side effects and see if she has the same issue with the name brand , ,Pia Mar M.D.

## 2020-10-15 NOTE — TELEPHONE ENCOUNTER
Call placed to patient.  Reports taking first dose yesterday of increased dose of levothyroxine.  Yesterday and today she noted heart racing and tingling in her fingers.    Patient states she has been on pure synthroid for 4 years due to GI issues.  Patient is wondering if symptoms could be related to this change and not just the increased dose  Patient would like to be on synthroid.  Darline De La Garza RN

## 2020-11-06 ENCOUNTER — VIRTUAL VISIT (OUTPATIENT)
Dept: FAMILY MEDICINE | Facility: OTHER | Age: 40
End: 2020-11-06

## 2020-11-06 NOTE — PROGRESS NOTES
"Date: 2020 10:11:04  Clinician: Dulce Xiong  Clinician NPI: [id number]  Patient: Kesha Carter  Patient : 1980  Patient Address: [address] [place] [address]  Patient Phone: [phone]  Visit Protocol: URI  Patient Summary:  Kesha is a 40 year old ( : 1980 ) female who initiated a [facility] Visit for COVID-19 (Coronavirus) evaluation and screening. When asked the question \"Please sign me up to receive news, health information and promotions. \", Kesha responded \"No\".    Kesha states her symptoms started 1-2 days ago.   Her symptoms consist of rhinitis, facial pain or pressure, myalgia, chills, malaise, diarrhea, a headache, nasal congestion, and nausea. She is experiencing mild difficulty breathing with activities but can speak normally in full sentences. Kesha also feels feverish.   Symptom details     Nasal secretions: The color of her mucus is clear.    Temperature: Her current temperature is 102.2 degrees Fahrenheit. Kesha has had a temperature over 100 degrees Fahrenheit for 1-2 days.     Facial pain or pressure: The facial pain or pressure does not feel worse when bending or leaning forward.     Headache: She states the headache is severe (7-9 on a 10 point pain scale).      Kesha denies having vomiting, sore throat, teeth pain, ageusia, ear pain, wheezing, cough, and anosmia. She also denies taking antibiotic medication in the past month, having recent facial or sinus surgery in the past 60 days, and having a sinus infection within the past year.   Precipitating events  She has not recently been exposed to someone with influenza. Kesha has not been in close contact with any high risk individuals.   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 She does not provide direct patient care. In the past 14 days, Kesha has not worked or volunteered at a healthcare facility or " group living setting.   In the past 14 days, she also has not lived in a congregate living setting.   Kesha has not had a close contact with a laboratory-confirmed COVID-19 patient within 14 days of symptom onset.    Since December 2019, Kesha has not been tested for COVID-19 and has not had upper respiratory infection or influenza-like illness.   Triage Point(s) temporarily suspended for COVID-19 (Coronavirus) screening  Kesha reported the following symptoms which were previously protocol referral points. These protocol referral points have temporarily been removed for purposes of COVID-19 (Coronavirus) screening.   Temperature &gt; 102. Current temperature: 102.2    Pertinent medical history  Kesha typically gets a yeast infection when she takes antibiotics. She has used fluconazole (Diflucan) to treat previous yeast infections. 2 doses of fluconazole (Diflucan) has typically been needed for symptoms to resolve in the past.  Kesha does not need a return to work/school note.   Weight: 160 lbs   Kesha does not smoke or use smokeless tobacco.   She denies pregnancy and denies breastfeeding. She has menstruated in the past month.   Weight: 160 lbs    MEDICATIONS: levothyroxine oral, lithium carbonate oral, spironolactone oral, Ortho-Cyclen (28) oral, ALLERGIES: Erythrocin  Clinician Response:  Dear Kesha,   Your symptoms show that you may have coronavirus (COVID-19). This illness can cause fever, cough and trouble breathing. Many people get a mild case and get better on their own. Some people can get very sick.  What should I do?  We would like to test you for this virus.   1. Please call [phone] to schedule your visit. Explain that you were referred by [facility] to have a COVID-19 test. Be ready to share your [facility] visit ID number.  * If you need to schedule in [facility] please call [phone] or for [place] employees please call [phone].  * If you need to schedule in the  "[place] area please call [phone]. [place] employees call [phone].  The following will serve as your written order for this COVID Test, ordered by me, for the indication of suspected COVID [Z20.828]: The test will be ordered in [employer], our electronic health record, after you are scheduled. It will show as ordered and authorized by Mio Rucker MD.  Order: COVID-19 (Coronavirus) PCR for SYMPTOMATIC [facility] [place]   2. When it's time for your COVID test:  Stay at least 6 feet away from others. (If someone will drive you to your test, stay in the backseat, as far away from the  as you can.)   Cover your mouth and nose with a mask, tissue or washcloth.  Go straight to the testing site. Don't make any stops on the way there or back.      3.Starting now: Stay home and away from others (self-isolate) until:   You've had no fever---and no medicine that reduces fever---for one full day (24 hours). And...   Your other symptoms have gotten better. For example, your cough or breathing has improved. And...   At least 10 days have passed since your symptoms started.       During this time, don't leave the house except for testing or medical care.   Stay in your own room, even for meals. Use your own bathroom if you can.   Stay away from others in your home. No hugging, kissing or shaking hands. No visitors.  Don't go to work, school or anywhere else.    Clean \"high touch\" surfaces often (doorknobs, counters, handles, etc.). Use a household cleaning spray or wipes. You'll find a full list of  on the EPA website: www.epa.gov/pesticide-registration/list-n-disinfectants-use-against-sars-cov-2.   Cover your mouth and nose with a mask, tissue or washcloth to avoid spreading germs.  Wash your hands and face often. Use soap and water.  Caregivers in these groups are at risk for severe illness due to COVID-19:  o People 65 years and older  o People who live in a nursing home or long-term care facility  o People with " chronic disease (lung, heart, cancer, diabetes, kidney, liver, immunologic)  o People who have a weakened immune system, including those who:   Are in cancer treatment  Take medicine that weakens the immune system, such as corticosteroids  Had a bone marrow or organ transplant  Have an immune deficiency  Have poorly controlled HIV or AIDS  Are obese (body mass index of 40 or higher)  Smoke regularly   o Caregivers should wear gloves while washing dishes, handling laundry and cleaning bedrooms and bathrooms.  o Use caution when washing and drying laundry: Don't shake dirty laundry, and use the warmest water setting that you can.  o For more tips, go to www.cdc.gov/coronavirus/2019-ncov/downloads/10Things.pdf.       How can I take care of myself?   Get lots of rest. Drink extra fluids (unless a doctor has told you not to).   Take Tylenol (acetaminophen) for fever or pain. If you have liver or kidney problems, ask your family doctor if it's okay to take Tylenol.   Adults can take either:    650 mg (two 325 mg pills) every 4 to 6 hours, or...   1,000 mg (two 500 mg pills) every 8 hours as needed.    Note: Don't take more than 3,000 mg in one day. Acetaminophen is found in many medicines (both prescribed and over-the-counter medicines). Read all labels to be sure you don't take too much.   For children, check the Tylenol bottle for the right dose. The dose is based on the child's age or weight.    If you have other health problems (like cancer, heart failure, an organ transplant or severe kidney disease): Call your specialty clinic if you don't feel better in the next 2 days.       Know when to call 911. Emergency warning signs include:    Trouble breathing or shortness of breath Pain or pressure in the chest that doesn't go away Feeling confused like you haven't felt before, or not being able to wake up Bluish-colored lips or face.  Where can I get more information?   [facility] -- About COVID-19:  [email or web address]   CDC -- What to Do If You're Sick: www.cdc.gov/coronavirus/2019-ncov/about/steps-when-sick.html   CDC -- Ending Home Isolation: www.cdc.gov/coronavirus/2019-ncov/hcp/disposition-in-home-patients.html   CDC -- Caring for Someone: www.cdc.gov/coronavirus/2019-ncov/if-you-are-sick/care-for-someone.html   [facility] -- Interim Guidance for Hospital Discharge to Home: www.[facility].mn./diseases/coronavirus/hcp/hospdischarge.pdf   [facility] clinical trials (COVID-19 research studies): clinicalaffairs.[facility]-clinical-trials    Below are the COVID-19 hotlines at the [place] Department of [facility] ([facility]). Interpreters are available.    For health questions: Call 579-604-9997 or 1-863.800.6738 (7 a.m. to 7 p.m.) For questions about schools and childcare: Call 059-881-0103 or 1-692.369.2280 (7 a.m. to 7 p.m.)    Diagnosis: Contact with and (suspected) exposure to other viral communicable diseases  Diagnosis ICD: Z20.828

## 2020-11-07 DIAGNOSIS — Z20.822 ENCOUNTER FOR LABORATORY TESTING FOR COVID-19 VIRUS: Primary | ICD-10-CM

## 2020-11-07 PROCEDURE — U0003 INFECTIOUS AGENT DETECTION BY NUCLEIC ACID (DNA OR RNA); SEVERE ACUTE RESPIRATORY SYNDROME CORONAVIRUS 2 (SARS-COV-2) (CORONAVIRUS DISEASE [COVID-19]), AMPLIFIED PROBE TECHNIQUE, MAKING USE OF HIGH THROUGHPUT TECHNOLOGIES AS DESCRIBED BY CMS-2020-01-R: HCPCS | Performed by: FAMILY MEDICINE

## 2020-11-08 LAB
SARS-COV-2 RNA SPEC QL NAA+PROBE: NOT DETECTED
SPECIMEN SOURCE: NORMAL

## 2020-12-04 NOTE — PATIENT INSTRUCTIONS
Take from 1 teaspoon to 3 scoops daily of the questran as needed for the loose stools   
Spontaneous, unlabored and symmetrical

## 2020-12-09 PROBLEM — M54.50 BILATERAL LOW BACK PAIN WITHOUT SCIATICA: Status: RESOLVED | Noted: 2020-05-13 | Resolved: 2020-12-09

## 2020-12-09 NOTE — PROGRESS NOTES
Discharge Note    Progress reporting period is from initial evaluation date (please see noted date below) to May 27, 2020.  Linked Episodes   Type: Episode: Status: Noted: Resolved: Last update: Updated by:   PHYSICAL THERAPY LBP 5-13-20 Active 5/13/2020 5/27/2020  9:58 AM Bibi Pierre PT      Comments:       Kesha failed to follow up and current status is unknown.  Please see information below for last relevant information on current status.  Patient seen for 2 visits.    SUBJECTIVE  Subjective changes noted by patient:  see virtual visit  ..   Changes in function:  Yes (See Goal flowsheet attached for changes in current functional level)  Adverse reaction to treatment or activity: None    OBJECTIVE  Changes noted in objective findings: see virtual visit     ASSESSMENT/PLAN  Diagnosis: LBP   Updated problem list and treatment plan:   Continue with HEP  STG/LTGs have been met or progress has been made towards goals:  Yes, please see goal flowsheet for most current information  Assessment of Progress: current status is unknown.    Last current status:     Self Management Plans:  HEP  I have re-evaluated this patient and find that the nature, scope, duration and intensity of the therapy is appropriate for the medical condition of the patient.  Kesha continues to require the following intervention to meet STG and LTG's:  HEP.    Recommendations:  Discharge with current home program.  Patient to follow up with MD as needed.    Please refer to the daily flowsheet for treatment today, total treatment time and time spent performing 1:1 timed codes.

## 2020-12-14 DIAGNOSIS — E03.9 ACQUIRED HYPOTHYROIDISM: ICD-10-CM

## 2020-12-14 LAB — TSH SERPL DL<=0.005 MIU/L-ACNC: 1.5 MU/L (ref 0.4–4)

## 2020-12-14 PROCEDURE — 36415 COLL VENOUS BLD VENIPUNCTURE: CPT | Performed by: FAMILY MEDICINE

## 2020-12-14 PROCEDURE — 84443 ASSAY THYROID STIM HORMONE: CPT | Performed by: FAMILY MEDICINE

## 2020-12-15 NOTE — RESULT ENCOUNTER NOTE
Kesha,  Your lab results were normal/stable. Please feel free to my chart or call the office with questions. Pia Mar M.D.

## 2021-02-11 DIAGNOSIS — M54.59 MECHANICAL LOW BACK PAIN: ICD-10-CM

## 2021-02-11 RX ORDER — CYCLOBENZAPRINE HCL 5 MG
TABLET ORAL
Qty: 60 TABLET | Refills: 3 | Status: SHIPPED | OUTPATIENT
Start: 2021-02-11 | End: 2021-07-19

## 2021-03-15 ENCOUNTER — TELEPHONE (OUTPATIENT)
Dept: FAMILY MEDICINE | Facility: CLINIC | Age: 41
End: 2021-03-15

## 2021-03-15 DIAGNOSIS — E03.4 HYPOTHYROIDISM DUE TO ACQUIRED ATROPHY OF THYROID: Primary | ICD-10-CM

## 2021-03-15 RX ORDER — LEVOTHYROXINE SODIUM 112 UG/1
112 TABLET ORAL DAILY
Qty: 90 TABLET | Refills: 2 | Status: SHIPPED | OUTPATIENT
Start: 2021-03-15 | End: 2021-03-24

## 2021-03-15 NOTE — TELEPHONE ENCOUNTER
Insurance prefers Levothyroxine, Levoxyl or Unithroid.  Are any of these acceptable or should we pursue prior authorization?  Please advise.    Thanks!    YONAS GARCIA

## 2021-03-24 ENCOUNTER — TELEPHONE (OUTPATIENT)
Dept: FAMILY MEDICINE | Facility: CLINIC | Age: 41
End: 2021-03-24

## 2021-03-24 DIAGNOSIS — E03.4 HYPOTHYROIDISM DUE TO ACQUIRED ATROPHY OF THYROID: ICD-10-CM

## 2021-03-24 RX ORDER — LEVOTHYROXINE SODIUM 112 UG/1
112 TABLET ORAL DAILY
Qty: 90 TABLET | Refills: 2 | Status: SHIPPED | OUTPATIENT
Start: 2021-03-24 | End: 2022-02-14

## 2021-03-24 NOTE — TELEPHONE ENCOUNTER
Pharmacist called.  Prior auth is needed for brand name.  Pt wants brand name telling pharmacist that she has had problems with generic option in the past.    Pt needs this processed terri Barrett RN

## 2021-03-24 NOTE — TELEPHONE ENCOUNTER
Reason for Call:  Other prescription    Detailed comments: Pt is calling on her medication of Synthroid, stating she is down to 1 pill but needs the actual synthroid not a generic.  Phar is stating she needs another prior auth for that to be refilled.  The other causes racing heart and sweaty palms.  Please advise  [facility]ar is [facility] in [place]    Phone Number Patient can be reached at: Home number on file [phone] (home)    Best Time: any    Can we leave a detailed message on this number? YES    Call taken on 3/24/2021 at 9:12 AM by Bridget Houston

## 2021-03-26 ENCOUNTER — TELEPHONE (OUTPATIENT)
Dept: FAMILY MEDICINE | Facility: CLINIC | Age: 41
End: 2021-03-26

## 2021-03-26 NOTE — TELEPHONE ENCOUNTER
Prior Authorization Retail Medication Request    Medication/Dose:   ICD code (if different than what is on RX):  Levothyroxine  Previously Tried and Failed:    Rationale:  Patient needs name brand    Insurance Name:  [employer]  Insurance ID:  [id number]      Pharmacy Information (if different than what is on RX)  Name:  [facility]  Phone:  [phone]

## 2021-03-29 NOTE — TELEPHONE ENCOUNTER
Central Prior Authorization Team   Phone: [phone]      PA started; waiting for insurance to respond with clinical questions.

## 2021-03-29 NOTE — TELEPHONE ENCOUNTER
Central Prior Authorization Team   Phone: [phone]      Prior Authorization Not Needed per Insurance    03/29/2021  Medication: BRAND SYNTHROID - NOT NEEDED  Insurance Company: [employer] - Phone [phone] Fax [phone]  Expected CoPay:      Pharmacy Filling the Rx: [id number] - [place], [place] - [address] AT [facility] [address]  Pharmacy Notified: Yes  Patient Notified: Yes (**pharmacy received a paid claim and patient picked up**)

## 2021-05-05 ENCOUNTER — OFFICE VISIT (OUTPATIENT)
Dept: FAMILY MEDICINE | Facility: CLINIC | Age: 41
End: 2021-05-05
Payer: COMMERCIAL

## 2021-05-05 VITALS
HEIGHT: 66 IN | SYSTOLIC BLOOD PRESSURE: 136 MMHG | OXYGEN SATURATION: 100 % | WEIGHT: 149 LBS | DIASTOLIC BLOOD PRESSURE: 88 MMHG | HEART RATE: 75 BPM | TEMPERATURE: 97.6 F | BODY MASS INDEX: 23.95 KG/M2

## 2021-05-05 DIAGNOSIS — N92.1 MENORRHAGIA WITH IRREGULAR CYCLE: Primary | ICD-10-CM

## 2021-05-05 DIAGNOSIS — E03.4 HYPOTHYROIDISM DUE TO ACQUIRED ATROPHY OF THYROID: ICD-10-CM

## 2021-05-05 DIAGNOSIS — D72.828 NEUTROPHILIA: ICD-10-CM

## 2021-05-05 DIAGNOSIS — F31.0 BIPOLAR DISORDER, CURRENT EPISODE HYPOMANIC (H): ICD-10-CM

## 2021-05-05 LAB
ERYTHROCYTE [DISTWIDTH] IN BLOOD BY AUTOMATED COUNT: 12.8 % (ref 10–15)
HCT VFR BLD AUTO: 42.8 % (ref 35–47)
HGB BLD-MCNC: 14.1 G/DL (ref 11.7–15.7)
MCH RBC QN AUTO: 31.8 PG (ref 26.5–33)
MCHC RBC AUTO-ENTMCNC: 32.9 G/DL (ref 31.5–36.5)
MCV RBC AUTO: 97 FL (ref 78–100)
PLATELET # BLD AUTO: 278 10E9/L (ref 150–450)
RBC # BLD AUTO: 4.43 10E12/L (ref 3.8–5.2)
WBC # BLD AUTO: 12.4 10E9/L (ref 4–11)

## 2021-05-05 PROCEDURE — 99213 OFFICE O/P EST LOW 20 MIN: CPT | Performed by: FAMILY MEDICINE

## 2021-05-05 PROCEDURE — 85027 COMPLETE CBC AUTOMATED: CPT | Performed by: FAMILY MEDICINE

## 2021-05-05 PROCEDURE — 36415 COLL VENOUS BLD VENIPUNCTURE: CPT | Performed by: FAMILY MEDICINE

## 2021-05-05 ASSESSMENT — MIFFLIN-ST. JEOR: SCORE: 1352.61

## 2021-05-05 NOTE — PROGRESS NOTES
Assessment & Plan     Menorrhagia with irregular cycle  **We will obtain ultrasound of the pelvis and a complete blood count I am going to leave it up to her whether she wants to follow-up with the gynecologist now or later she has a referral that is good for a year*  - US Pelvic Complete with Transvaginal; Future  - CBC with platelets  - OB/GYN REFERRAL    Hypothyroidism due to acquired atrophy of thyroid  Last check of thyroid was normal in just about 4 months ago so I did not repeat this at this time        3. Bipolar disorder, current episode hypomanic (H)  *Her mood is very stable she has been stable for quite a while she is being managed by psychiatry.  Patient Instructions   Make the ultrasound appointment when you want   Gyne consult after   **           Return in about 1 month (around 6/5/2021) for with consultant as planned.    Pia Mar MD  [facility] JENA Gardner is a 40 year old who presents for the following health issues    HPI     Menstrual Concern  Onset/Duration: had period for 2 months, just stopped on Sunday.  Daughters got period last year and that has added some spotting.   Description:   Duration of bleeding episodes: 25 days  Frequency of periods: (1st day of one to 1st day of next):  every  days  Describe bleeding/flow:   Clots:  no   Number of pads/day: 3        Cramping: severe, moderate, before, during and after  Accompanying Signs & Symptoms:  Lightheadedness: no  Temperature intolerance: no  Nosebleeds/Easy bruising: no  Vaginal Discharge: no  Acne: no  Change in body hair: no  History:  No LMP recorded.  Previous normal periods: YES  Contraceptive use: oral contraceptive but stopped   Sexually active: YES  Any bleeding after intercourse: YES - sometimes   Abnormal PAP Smears: no  Precipitating or alleiating factors: Daughter's got their period.   Therapies tried and outcome: she stopped the ocps and still bleeding       This has been going on for  "the last few months  Bled when having depo,  Bled on and off ocps      Review of Systems   Constitutional, HEENT, cardiovascular, pulmonary, gi and gu systems are negative, except as otherwise noted.      Objective    /88   Pulse 75   Temp 97.6  F (36.4  C) (Tympanic)   Ht 1.676 m (5' 6\")   Wt 67.6 kg (149 lb)   SpO2 100%   BMI 24.05 kg/m    Body mass index is 24.05 kg/m .  Physical Exam   GENERAL: healthy, alert and no distress  ABDOMEN: soft, nontender, no hepatosplenomegaly, no masses and bowel sounds normal    No results found for this or any previous visit (from the past 24 hour(s)).    Pia Mar M.D.          "

## 2021-05-14 ENCOUNTER — MYC MEDICAL ADVICE (OUTPATIENT)
Dept: FAMILY MEDICINE | Facility: CLINIC | Age: 41
End: 2021-05-14

## 2021-05-14 DIAGNOSIS — B37.31 CANDIDA VAGINITIS: Primary | ICD-10-CM

## 2021-05-14 RX ORDER — FLUCONAZOLE 150 MG/1
150 TABLET ORAL DAILY
Qty: 3 TABLET | Refills: 1 | Status: SHIPPED | OUTPATIENT
Start: 2021-05-14 | End: 2021-05-17

## 2021-05-21 DIAGNOSIS — D72.828 NEUTROPHILIA: ICD-10-CM

## 2021-05-21 LAB
ERYTHROCYTE [DISTWIDTH] IN BLOOD BY AUTOMATED COUNT: 12.4 % (ref 10–15)
HCT VFR BLD AUTO: 38.8 % (ref 35–47)
HGB BLD-MCNC: 12.9 G/DL (ref 11.7–15.7)
MCH RBC QN AUTO: 32 PG (ref 26.5–33)
MCHC RBC AUTO-ENTMCNC: 33.2 G/DL (ref 31.5–36.5)
MCV RBC AUTO: 96 FL (ref 78–100)
PLATELET # BLD AUTO: 315 10E9/L (ref 150–450)
RBC # BLD AUTO: 4.03 10E12/L (ref 3.8–5.2)
WBC # BLD AUTO: 7.4 10E9/L (ref 4–11)

## 2021-05-21 PROCEDURE — 85027 COMPLETE CBC AUTOMATED: CPT | Performed by: FAMILY MEDICINE

## 2021-05-21 PROCEDURE — 36415 COLL VENOUS BLD VENIPUNCTURE: CPT | Performed by: FAMILY MEDICINE

## 2021-06-04 ENCOUNTER — HOSPITAL ENCOUNTER (OUTPATIENT)
Dept: MAMMOGRAPHY | Facility: CLINIC | Age: 41
Discharge: HOME | End: 2021-06-04
Attending: FAMILY MEDICINE | Admitting: FAMILY MEDICINE
Payer: COMMERCIAL

## 2021-06-04 DIAGNOSIS — Z12.31 ENCOUNTER FOR SCREENING MAMMOGRAM FOR BREAST CANCER: ICD-10-CM

## 2021-06-04 PROCEDURE — 77067 SCR MAMMO BI INCL CAD: CPT

## 2021-07-18 DIAGNOSIS — M54.59 MECHANICAL LOW BACK PAIN: ICD-10-CM

## 2021-07-19 RX ORDER — CYCLOBENZAPRINE HCL 5 MG
TABLET ORAL
Qty: 60 TABLET | Refills: 3 | Status: SHIPPED | OUTPATIENT
Start: 2021-07-19 | End: 2021-11-02

## 2021-09-16 DIAGNOSIS — L70.0 ACNE VULGARIS: ICD-10-CM

## 2021-09-17 RX ORDER — SPIRONOLACTONE 50 MG/1
TABLET, FILM COATED ORAL
Qty: 90 TABLET | Refills: 0 | Status: SHIPPED | OUTPATIENT
Start: 2021-09-17 | End: 2021-10-05

## 2021-10-02 ENCOUNTER — HEALTH MAINTENANCE LETTER (OUTPATIENT)
Age: 41
End: 2021-10-02

## 2021-10-04 ASSESSMENT — ENCOUNTER SYMPTOMS
DYSURIA: 0
NAUSEA: 0
PALPITATIONS: 0
NERVOUS/ANXIOUS: 0
WEAKNESS: 0
ARTHRALGIAS: 1
SHORTNESS OF BREATH: 0
CHILLS: 0
DIARRHEA: 0
COUGH: 0
EYE PAIN: 0
BREAST MASS: 0
JOINT SWELLING: 1
ABDOMINAL PAIN: 0
SORE THROAT: 0
HEADACHES: 0
PARESTHESIAS: 0
CONSTIPATION: 0
MYALGIAS: 0
FEVER: 0
HEARTBURN: 0
DIZZINESS: 0
HEMATOCHEZIA: 0
HEMATURIA: 0
FREQUENCY: 0

## 2021-10-05 ENCOUNTER — OFFICE VISIT (OUTPATIENT)
Dept: FAMILY MEDICINE | Facility: CLINIC | Age: 41
End: 2021-10-05
Payer: COMMERCIAL

## 2021-10-05 VITALS
TEMPERATURE: 97.9 F | OXYGEN SATURATION: 100 % | BODY MASS INDEX: 23.14 KG/M2 | HEIGHT: 66 IN | HEART RATE: 54 BPM | WEIGHT: 144 LBS | SYSTOLIC BLOOD PRESSURE: 110 MMHG | DIASTOLIC BLOOD PRESSURE: 82 MMHG

## 2021-10-05 DIAGNOSIS — Z23 NEED FOR VACCINATION: ICD-10-CM

## 2021-10-05 DIAGNOSIS — E03.4 HYPOTHYROIDISM DUE TO ACQUIRED ATROPHY OF THYROID: ICD-10-CM

## 2021-10-05 DIAGNOSIS — L70.0 ACNE VULGARIS: ICD-10-CM

## 2021-10-05 DIAGNOSIS — R45.4 IRRITABILITY: ICD-10-CM

## 2021-10-05 DIAGNOSIS — Z00.00 ROUTINE GENERAL MEDICAL EXAMINATION AT A HEALTH CARE FACILITY: Primary | ICD-10-CM

## 2021-10-05 DIAGNOSIS — G43.839 MENSTRUAL MIGRAINE, INTRACTABLE, WITHOUT STATUS MIGRAINOSUS: ICD-10-CM

## 2021-10-05 PROCEDURE — 90472 IMMUNIZATION ADMIN EACH ADD: CPT | Performed by: FAMILY MEDICINE

## 2021-10-05 PROCEDURE — 90686 IIV4 VACC NO PRSV 0.5 ML IM: CPT | Performed by: FAMILY MEDICINE

## 2021-10-05 PROCEDURE — 99396 PREV VISIT EST AGE 40-64: CPT | Mod: 25 | Performed by: FAMILY MEDICINE

## 2021-10-05 PROCEDURE — 90714 TD VACC NO PRESV 7 YRS+ IM: CPT | Performed by: FAMILY MEDICINE

## 2021-10-05 PROCEDURE — 90471 IMMUNIZATION ADMIN: CPT | Performed by: FAMILY MEDICINE

## 2021-10-05 PROCEDURE — 99213 OFFICE O/P EST LOW 20 MIN: CPT | Mod: 25 | Performed by: FAMILY MEDICINE

## 2021-10-05 RX ORDER — SPIRONOLACTONE 50 MG/1
50 TABLET, FILM COATED ORAL DAILY
Qty: 90 TABLET | Refills: 3 | Status: SHIPPED | OUTPATIENT
Start: 2021-10-05 | End: 2022-10-11

## 2021-10-05 RX ORDER — ZOLMITRIPTAN 5 MG/1
TABLET, FILM COATED ORAL
Qty: 6 TABLET | Refills: 1 | Status: SHIPPED | OUTPATIENT
Start: 2021-10-05 | End: 2023-10-16

## 2021-10-05 RX ORDER — NORGESTIMATE AND ETHINYL ESTRADIOL 0.25-0.035
KIT ORAL
Qty: 112 TABLET | Refills: 4 | Status: ON HOLD | OUTPATIENT
Start: 2021-10-05 | End: 2022-02-07

## 2021-10-05 ASSESSMENT — ENCOUNTER SYMPTOMS
DIZZINESS: 0
DIARRHEA: 0
SORE THROAT: 0
PARESTHESIAS: 0
JOINT SWELLING: 1
EYE PAIN: 0
HEARTBURN: 0
MYALGIAS: 0
BREAST MASS: 0
NERVOUS/ANXIOUS: 0
WEAKNESS: 0
HEMATOCHEZIA: 0
ABDOMINAL PAIN: 0
HEMATURIA: 0
CHILLS: 0
FREQUENCY: 0
NAUSEA: 0
HEADACHES: 0
ARTHRALGIAS: 1
FEVER: 0
SHORTNESS OF BREATH: 0
CONSTIPATION: 0
PALPITATIONS: 0
COUGH: 0
DYSURIA: 0

## 2021-10-05 ASSESSMENT — MIFFLIN-ST. JEOR: SCORE: 1329.93

## 2021-10-05 NOTE — PATIENT INSTRUCTIONS
Preventive Health Recommendations  Female Ages 40 to 49    Yearly exam:     See your health care provider every year in order to  1. Review health changes.   2. Discuss preventive care.    3. Review your medicines if your doctor prescribed any.      Get a Pap test every three years (unless you have an abnormal result and your provider advises testing more often).      If you get Pap tests with HPV test, you only need to test every 5 years, unless you have an abnormal result. You do not need a Pap test if your uterus was removed (hysterectomy) and you have not had cancer.      You should be tested each year for STDs (sexually transmitted diseases), if you're at risk.     Ask your doctor if you should have a mammogram.      Have a colonoscopy (test for colon cancer) if someone in your family has had colon cancer or polyps before age 50.       Have a cholesterol test every 5 years.       Have a diabetes test (fasting glucose) after age 45. If you are at risk for diabetes, you should have this test every 3 years.    Shots: Get a flu shot each year. Get a tetanus shot every 10 years.     Nutrition:     Eat at least 5 servings of fruits and vegetables each day.    Eat whole-grain bread, whole-wheat pasta and brown rice instead of white grains and rice.    Get adequate Calcium and Vitamin D.      Lifestyle    Exercise at least 150 minutes a week (an average of 30 minutes a day, 5 days a week). This will help you control your weight and prevent disease.    Limit alcohol to one drink per day.    No smoking.     Wear sunscreen to prevent skin cancer.    See your dentist every six months for an exam and cleaning.      The direct lines to the  are [phone] and [phone]    The 's numbers are [phone] and [phone]

## 2021-10-05 NOTE — PROGRESS NOTES
SUBJECTIVE:   CC: Kesha Carter is an 40 year old woman who presents for preventive health visit.     Patient has been advised of split billing requirements and indicates understanding: Yes  Healthy Habits:     Getting at least 3 servings of Calcium per day:  Yes    Bi-annual eye exam:  NO    Dental care twice a year:  NO    Sleep apnea or symptoms of sleep apnea:  None    Diet:  Gluten-free/reduced    Frequency of exercise:  6-7 days/week    Duration of exercise:  Greater than 60 minutes    Taking medications regularly:  No    Medication side effects:  None    PHQ-2 Total Score: 0    Additional concerns today:  No            Today's PHQ-2 Score:   PHQ-2 (  [employer]) 10/4/2021   Q1: Little interest or pleasure in doing things 0   Q2: Feeling down, depressed or hopeless 0   PHQ-2 Score 0   Q1: Little interest or pleasure in doing things Not at all   Q2: Feeling down, depressed or hopeless Not at all   PHQ-2 Score 0       Abuse: Current or Past (Physical, Sexual or Emotional) - No  Do you feel safe in your environment? Yes        Social History     Tobacco Use     Smoking status: Former Smoker     Types: Cigarettes     Quit date: 2016     Years since quittin.1     Smokeless tobacco: Never Used   Substance Use Topics     Alcohol use: Yes     Comment: Once a month      If you drink alcohol do you typically have >3 drinks per day or >7 drinks per week? No    Alcohol Use 10/4/2021   Prescreen: >3 drinks/day or >7 drinks/week? No   Prescreen: >3 drinks/day or >7 drinks/week? -   No flowsheet data found.    Reviewed orders with patient.  Reviewed health maintenance and updated orders accordingly - Yes  Lab work is in process    Breast Cancer Screening:    FHS-7:   Breast CA Risk Assessment (FHS-7) 10/4/2021   Did any of your first-degree relatives have breast or ovarian cancer? No   Did any of your relatives have bilateral breast cancer? No   Did any man in your family have breast cancer? Yes   Did any  woman in your family have breast and ovarian cancer? No   Did any woman in your family have breast cancer before age 50 y? No   Do you have 2 or more relatives with breast and/or ovarian cancer? No     click delete button to remove this line now    Pertinent mammograms are reviewed under the imaging tab.    History of abnormal Pap smear: NO - age 30-65 PAP every 5 years with negative HPV co-testing recommended  PAP / HPV Latest Ref Rng & Units 2019   PAP (Historical) - NIL NIL NIL   HPV16 NEG:Negative Negative Negative -   HPV18 NEG:Negative Negative Negative -   HRHPV NEG:Negative Negative Negative -     Reviewed and updated as needed this visit by clinical staff                 Reviewed and updated as needed this visit by Provider                Past Medical History:   Diagnosis Date     Thyroid disease       Past Surgical History:   Procedure Laterality Date     C REPAIR CRUCIATE LIGAMENT,KNEE      Left acl repair     C/SECTION, LOW TRANSVERSE      , Low Transverse     C/SECTION, LOW TRANSVERSE      , Low Transverse     LAPAROSCOPIC CHOLECYSTECTOMY N/A 2017    Procedure: LAPAROSCOPIC CHOLECYSTECTOMY;  Surgeon: Tani Brenner MD;  Location: [facility] OR     MAMMOPLASTY AUGMENTATION BILATERAL  2010    Implants.     TUBAL LIGATION      With C/S       Review of Systems   Constitutional: Negative for chills and fever.   HENT: Negative for congestion, ear pain, hearing loss and sore throat.    Eyes: Negative for pain and visual disturbance.   Respiratory: Negative for cough and shortness of breath.    Cardiovascular: Negative for chest pain, palpitations and peripheral edema.   Gastrointestinal: Negative for abdominal pain, constipation, diarrhea, heartburn, hematochezia and nausea.   Breasts:  Negative for tenderness, breast mass and discharge.   Genitourinary: Positive for vaginal bleeding. Negative for dysuria, frequency, genital sores, hematuria,  "pelvic pain, urgency and vaginal discharge.   Musculoskeletal: Positive for arthralgias and joint swelling. Negative for myalgias.   Skin: Negative for rash.   Neurological: Negative for dizziness, weakness, headaches and paresthesias.   Psychiatric/Behavioral: Negative for mood changes. The patient is not nervous/anxious.           OBJECTIVE:   /82   Pulse 54   Temp 97.9  F (36.6  C) (Tympanic)   Ht 1.676 m (5' 6\")   Wt 65.3 kg (144 lb)   SpO2 100%   BMI 23.24 kg/m    Physical Exam  GENERAL: healthy, alert and no distress  EYES: Eyes grossly normal to inspection, PERRL and conjunctivae and sclerae normal  HENT: ear canals and TM's normal, nose and mouth without ulcers or lesions  NECK: no adenopathy, no asymmetry, masses, or scars and thyroid normal to palpation  RESP: lungs clear to auscultation - no rales, rhonchi or wheezes  BREAST: normal without masses, tenderness or nipple discharge and no palpable axillary masses or adenopathy  CV: regular rate and rhythm, normal S1 S2, no S3 or S4, no murmur, click or rub, no peripheral edema and peripheral pulses strong  ABDOMEN: soft, nontender, no hepatosplenomegaly, no masses and bowel sounds normal  MS: no gross musculoskeletal defects noted, no edema  SKIN: no suspicious lesions or rashes  NEURO: Normal strength and tone, mentation intact and speech normal  PSYCH: mentation appears normal, affect normal/bright    Diagnostic Test Results:  Labs reviewed in Epic  No results found for any visits on 10/05/21.    ASSESSMENT/PLAN:   (Z00.00) Routine general medical examination at a health care facility  (primary encounter diagnosis)  Comment:   Plan: **TSH with free T4 reflex FUTURE 2mo, **Basic         metabolic panel FUTURE 2mo        No results found for any visits on 10/05/21.      (L70.0) Acne vulgaris  Comment:   Plan: norgestimate-ethinyl estradiol (ESTARYLLA)         0.25-35 MG-MCG tablet, spironolactone         (ALDACTONE) 50 MG tablet        Well " "controlled     (R45.4) Irritability  Comment:   Plan: norgestimate-ethinyl estradiol (ESTARYLLA)         0.25-35 MG-MCG tablet        Controlled     (G43.839) Menstrual migraine, intractable, without status migrainosus  Comment:   Plan: norgestimate-ethinyl estradiol (ESTARYLLA)         0.25-35 MG-MCG tablet, ZOLMitriptan (ZOMIG) 5         MG tablet        Infrequent better control with the hormones     (E03.4) Hypothyroidism due to acquired atrophy of thyroid  Comment:   Plan: **TSH with free T4 reflex FUTURE 2mo            (Z23) Need for vaccination  Comment:   Plan:     Patient has been advised of split billing requirements and indicates understanding: Yes  COUNSELING:  Reviewed preventive health counseling, as reflected in patient instructions           Estimated body mass index is 24.05 kg/m  as calculated from the following:    Height as of 5/5/21: 1.676 m (5' 6\").    Weight as of 5/5/21: 67.6 kg (149 lb).        She reports that she quit smoking about 5 years ago. Her smoking use included cigarettes. She has never used smokeless tobacco.      Counseling Resources:  ATP IV Guidelines  Pooled Cohorts Equation Calculator  Breast Cancer Risk Calculator  BRCA-Related Cancer Risk Assessment: FHS-7 Tool  FRAX Risk Assessment  ICSI Preventive Guidelines  Dietary Guidelines for Americans, 2010  USDA's MyPlate  ASA Prophylaxis  Lung CA Screening    Pia Mar MD  [facility]  "

## 2021-10-20 ENCOUNTER — LAB (OUTPATIENT)
Dept: LAB | Facility: CLINIC | Age: 41
End: 2021-10-20
Payer: COMMERCIAL

## 2021-10-20 DIAGNOSIS — F31.76 DEPRESSED BIPOLAR I DISORDER IN REMISSION (H): Primary | ICD-10-CM

## 2021-10-20 DIAGNOSIS — E03.4 HYPOTHYROIDISM DUE TO ACQUIRED ATROPHY OF THYROID: ICD-10-CM

## 2021-10-20 DIAGNOSIS — Z00.00 ROUTINE GENERAL MEDICAL EXAMINATION AT A HEALTH CARE FACILITY: ICD-10-CM

## 2021-10-20 LAB
ANION GAP SERPL CALCULATED.3IONS-SCNC: 6 MMOL/L (ref 3–14)
ANION GAP SERPL CALCULATED.3IONS-SCNC: 7 MMOL/L (ref 3–14)
BUN SERPL-MCNC: 19 MG/DL (ref 7–30)
BUN SERPL-MCNC: 20 MG/DL (ref 7–30)
CALCIUM SERPL-MCNC: 8.7 MG/DL (ref 8.5–10.1)
CALCIUM SERPL-MCNC: 9.1 MG/DL (ref 8.5–10.1)
CHLORIDE BLD-SCNC: 108 MMOL/L (ref 94–109)
CHLORIDE BLD-SCNC: 108 MMOL/L (ref 94–109)
CO2 SERPL-SCNC: 24 MMOL/L (ref 20–32)
CO2 SERPL-SCNC: 26 MMOL/L (ref 20–32)
CREAT SERPL-MCNC: 0.79 MG/DL (ref 0.52–1.04)
CREAT SERPL-MCNC: 0.83 MG/DL (ref 0.52–1.04)
GFR SERPL CREATININE-BSD FRML MDRD: 87 ML/MIN/1.73M2
GFR SERPL CREATININE-BSD FRML MDRD: >90 ML/MIN/1.73M2
GLUCOSE BLD-MCNC: 78 MG/DL (ref 70–99)
GLUCOSE BLD-MCNC: 82 MG/DL (ref 70–99)
LITHIUM SERPL-SCNC: 0.6 MMOL/L
POTASSIUM BLD-SCNC: 3.7 MMOL/L (ref 3.4–5.3)
POTASSIUM BLD-SCNC: 3.9 MMOL/L (ref 3.4–5.3)
SODIUM SERPL-SCNC: 139 MMOL/L (ref 133–144)
SODIUM SERPL-SCNC: 140 MMOL/L (ref 133–144)
TSH SERPL DL<=0.005 MIU/L-ACNC: 2 MU/L (ref 0.4–4)

## 2021-10-20 PROCEDURE — 84443 ASSAY THYROID STIM HORMONE: CPT

## 2021-10-20 PROCEDURE — 80178 ASSAY OF LITHIUM: CPT

## 2021-10-20 PROCEDURE — 80048 BASIC METABOLIC PNL TOTAL CA: CPT

## 2021-10-20 PROCEDURE — 36415 COLL VENOUS BLD VENIPUNCTURE: CPT

## 2021-11-02 DIAGNOSIS — M54.59 MECHANICAL LOW BACK PAIN: ICD-10-CM

## 2021-11-02 RX ORDER — CYCLOBENZAPRINE HCL 5 MG
TABLET ORAL
Qty: 60 TABLET | Refills: 3 | Status: SHIPPED | OUTPATIENT
Start: 2021-11-02 | End: 2022-06-29

## 2022-01-20 ENCOUNTER — TELEPHONE (OUTPATIENT)
Dept: FAMILY MEDICINE | Facility: CLINIC | Age: 42
End: 2022-01-20
Payer: COMMERCIAL

## 2022-01-20 NOTE — TELEPHONE ENCOUNTER
Prior Authorization Retail Medication Request    Medication/Dose:   ICD code (if different than what is on RX):  Hypothyroidism due to acquired atrophy of thyroid [E03.4]   Previously Tried and Failed:    Rationale:  ZABRINA 1    Insurance Name:  [employer]  Insurance ID:  [id number]      Pharmacy Information (if different than what is on RX)  Name:  [facility]  Phone:  [phone]

## 2022-01-21 ENCOUNTER — HOSPITAL ENCOUNTER (OUTPATIENT)
Dept: ULTRASOUND IMAGING | Facility: CLINIC | Age: 42
Discharge: HOME | End: 2022-01-21
Attending: FAMILY MEDICINE | Admitting: FAMILY MEDICINE
Payer: COMMERCIAL

## 2022-01-21 DIAGNOSIS — N92.1 MENORRHAGIA WITH IRREGULAR CYCLE: ICD-10-CM

## 2022-01-21 PROCEDURE — 76830 TRANSVAGINAL US NON-OB: CPT

## 2022-01-24 ENCOUNTER — TELEPHONE (OUTPATIENT)
Dept: FAMILY MEDICINE | Facility: CLINIC | Age: 42
End: 2022-01-24
Payer: COMMERCIAL

## 2022-01-24 NOTE — TELEPHONE ENCOUNTER
Central Prior Authorization Team   Phone: [phone]      PA Initiation    Medication: Synthroid 112 ZABRINA 1  Insurance Company: [employer] - Phone [phone] Fax [phone]  Pharmacy Filling the Rx: [facility] - [place], [place] - [address] [place]  Filling Pharmacy Phone: [phone]  Filling Pharmacy Fax:    Start Date: 1/24/2022

## 2022-01-24 NOTE — TELEPHONE ENCOUNTER
Call received from Marie from patient's new pharmacy    Marie states patient is switching pharmacies  States her Levothyroxine requires a PA and was checking on the PA that was sent last week     Chart reviewed  PA was sent on 1/20/2022 to PA team    Relayed to Marie Salazar verbalized understanding  No Further questions/concerns    Hans Lee RN

## 2022-01-24 NOTE — TELEPHONE ENCOUNTER
Prior Authorization Approval    Authorization Effective Date: 12/25/2021  Authorization Expiration Date: 1/24/2023  Medication: Synthroid 112 ZABRINA 1-APPROVED  Approved Dose/Quantity:   Reference #:     Insurance Company: [employer] - Phone [phone] Fax [phone]  Expected CoPay:       CoPay Card Available:      Foundation Assistance Needed:    Which Pharmacy is filling the prescription (Not needed for infusion/clinic administered): [facility] - [facility] [address] [facility]  Pharmacy Notified: Yes  Patient Notified: No    Pharmacy will notify patient when medication is ready.

## 2022-01-26 ENCOUNTER — TELEPHONE (OUTPATIENT)
Dept: OBGYN | Facility: CLINIC | Age: 42
End: 2022-01-26

## 2022-01-26 ENCOUNTER — OFFICE VISIT (OUTPATIENT)
Dept: OBGYN | Facility: CLINIC | Age: 42
End: 2022-01-26
Payer: COMMERCIAL

## 2022-01-26 VITALS
DIASTOLIC BLOOD PRESSURE: 76 MMHG | HEIGHT: 66 IN | BODY MASS INDEX: 23.48 KG/M2 | WEIGHT: 146.1 LBS | TEMPERATURE: 98 F | HEART RATE: 72 BPM | SYSTOLIC BLOOD PRESSURE: 136 MMHG | RESPIRATION RATE: 16 BRPM

## 2022-01-26 DIAGNOSIS — R93.89 ENDOMETRIAL THICKENING ON ULTRASOUND: ICD-10-CM

## 2022-01-26 DIAGNOSIS — N92.1 MENORRHAGIA WITH IRREGULAR CYCLE: Primary | ICD-10-CM

## 2022-01-26 PROCEDURE — 99203 OFFICE O/P NEW LOW 30 MIN: CPT | Performed by: OBSTETRICS & GYNECOLOGY

## 2022-01-26 ASSESSMENT — MIFFLIN-ST. JEOR: SCORE: 1334.46

## 2022-01-26 NOTE — NURSING NOTE
"Initial /76 (BP Location: Right arm, Patient Position: Chair, Cuff Size: Adult Regular)   Pulse 72   Temp 98  F (36.7  C) (Tympanic)   Resp 16   Ht 1.676 m (5' 6\")   Wt 66.3 kg (146 lb 1.6 oz)   LMP 01/18/2022   BMI 23.58 kg/m   Estimated body mass index is 23.58 kg/m  as calculated from the following:    Height as of this encounter: 1.676 m (5' 6\").    Weight as of this encounter: 66.3 kg (146 lb 1.6 oz). .    Mirella Currie LPN  "

## 2022-01-26 NOTE — PROGRESS NOTES
CC:  Consult from Pia Mar  for ongoing bleeding  HPI:  Kesha Carter is a 41 year old female is a   .  Patient's last menstrual period was 2022.  Menses are ongoing , starting in May 2021.  She has been dealing with this problem among her  going into treatment and the crazy time around the pandemic, she finally was treated with OCP's in October. The pill has not changed anything.  She underwent a pelvic ULTRASOUND and we are going to review those films today.  She had discussed the possibility of an ablation with her primary.  She has no interest in any further menstrual periods.    Patients records are available and reviewed at today's visit.    Past GYN history:  No STD history       Last PAP smear:  Normal  Last TSH:   TSH   Date Value Ref Range Status   10/20/2021 2.00 0.40 - 4.00 mU/L Final   2020 1.50 0.40 - 4.00 mU/L Final      , normal?  Yes    Lab Results   Component Value Date    WBC 7.4 2021     Lab Results   Component Value Date    RBC 4.03 2021     Lab Results   Component Value Date    HGB 12.9 2021     Lab Results   Component Value Date    HCT 38.8 2021     No components found for: MCT  Lab Results   Component Value Date    MCV 96 2021     Lab Results   Component Value Date    MCH 32.0 2021     Lab Results   Component Value Date    MCHC 33.2 2021     Lab Results   Component Value Date    RDW 12.4 2021     Lab Results   Component Value Date     2021       Past Medical History:   Diagnosis Date     Thyroid disease        Past Surgical History:   Procedure Laterality Date     C/SECTION, LOW TRANSVERSE      , Low Transverse     C/SECTION, LOW TRANSVERSE      , Low Transverse     LAPAROSCOPIC CHOLECYSTECTOMY N/A 2017    Procedure: LAPAROSCOPIC CHOLECYSTECTOMY;  Surgeon: Tani Brenner MD;  Location: [facility] OR     MAMMOPLASTY AUGMENTATION BILATERAL  2010    Implants.     TUBAL  LIGATION  2009    With C/S     [facility] REPAIR CRUCIATE LIGAMENT,KNEE  1996    Left acl repair       Family History   Problem Relation Age of Onset     Thyroid Disease Mother      Arthritis Mother      Hypertension Mother      Diabetes Mother      Anxiety Disorder Mother      Obesity Mother      Thyroid Disease Maternal Grandmother      Hypertension Maternal Grandmother      Cardiovascular Maternal Grandfather      Heart Disease Maternal Grandfather      Hypertension Maternal Grandfather      Hypertension Paternal Grandmother      Cerebrovascular Disease Paternal Grandfather      Obesity Sister        Allergies: Erythromycin, Other [no clinical screening - see comments], and Liquid adhesive    Current Outpatient Medications   Medication Sig Dispense Refill     ammonium lactate (LAC-HYDRIN) 12 % cream Apply topically 2 times daily 120 g 11     clonazePAM (KLONOPIN) 0.5 MG tablet Take 0.5-1 tablets (0.25-0.5 mg) by mouth nightly as needed for other (insomnia) 20 tablet 0     cyclobenzaprine (FLEXERIL) 5 MG tablet TAKE 1 TO 2 TABLETS(5 TO 10 MG) BY MOUTH EVERY NIGHT AS NEEDED FOR MUSCLE SPASMS 60 tablet 3     levothyroxine (SYNTHROID/LEVOTHROID) 112 MCG tablet Take 1 tablet (112 mcg) by mouth daily 90 tablet 2     lithium 600 MG capsule        multivitamin, therapeutic with minerals (MULTI-VITAMIN) TABS tablet Take 1 tablet by mouth daily 100 tablet 3     norgestimate-ethinyl estradiol (ESTARYLLA) 0.25-35 MG-MCG tablet TAKE 1 ACTIVE PILL DAILY FOR 12 WEEKS, SKIP PLACEBO PILLS 112 tablet 4     ondansetron 8 MG PO ODT tab Take 1 tablet (8 mg) by mouth every 8 hours as needed for nausea 16 tablet 0     polyethylene glycol (MIRALAX) powder Take 1 capful by mouth daily       Probiotic Product (PROBIOTIC DAILY PO)        spironolactone (ALDACTONE) 50 MG tablet Take 1 tablet (50 mg) by mouth daily 90 tablet 3     temazepam (RESTORIL) 15 MG capsule  30 capsule      ZOLMitriptan (ZOMIG) 5 MG tablet TAKE 1 TABLET(5 MG) BY MOUTH AT  "ONSET OF HEADACHE FOR MIGRAINE. MAY REPEAT IN 2 HOURS. MAX 2 TABLETS/ 24 HOURS 6 tablet 1     cholestyramine (QUESTRAN) 4 GM/DOSE powder Take 2 g by mouth 3 times daily (with meals) Take as directed to control GI symptoms (Patient not taking: Reported on 1/26/2022) 540 g 3     scopolamine 1 MG/3DAYS TD 72 hr patch Apply 1 patch to hairless area behind one ear at least 4 hours before travel.  Remove old patch and change every 3 days (72 hours). (Patient not taking: Reported on 1/26/2022) 3 patch 1       ROS:  C: NEGATIVE for fever, chills, change in weight  I: NEGATIVE for worrisome rashes, moles or lesions  E: NEGATIVE for vision changes or irritation  E/M: NEGATIVE for ear, mouth and throat problems  R: NEGATIVE for significant cough or SOB  CV: NEGATIVE for chest pain, palpitations or peripheral edema  GI: NEGATIVE for nausea, abdominal pain, heartburn, or change in bowel habits  : NEGATIVE for frequency, dysuria, hematuria, vaginal discharge  M: NEGATIVE for significant arthralgias or myalgia  N: NEGATIVE for weakness, dizziness or paresthesias  E: NEGATIVE for temperature intolerance, skin/hair changes  P: NEGATIVE for changes in mood or affect    EXAM:  Blood pressure 136/76, pulse 72, temperature 98  F (36.7  C), temperature source Tympanic, resp. rate 16, height 1.676 m (5' 6\"), weight 66.3 kg (146 lb 1.6 oz), last menstrual period 01/18/2022, not currently breastfeeding.   BMI= Body mass index is 23.58 kg/m .  General - pleasant female in no acute distress.  No exam today    ULTRASOUND films reviewed and there is a polypoid lesion in the lower uterine segment    ASSESSMENT/PLAN:  (N92.1) Menorrhagia with irregular cycle  (primary encounter diagnosis)  Comment: Ongoing  Plan: Case Request: HYSTEROSCOPY, WITH DILATION AND         CURETTAGE OF UTERUS, AND ENDOMETRIAL ABLATION            (R93.89) Endometrial thickening on ultrasound  Comment: Possible endometrial polyp  Plan: Case Request: HYSTEROSCOPY, WITH " DILATION AND         CURETTAGE OF UTERUS, AND ENDOMETRIAL ABLATION          I described the procedures as indicated above. The types of anesthesia were reviewed. The pre and post-op expectations were noted. We discussed the risks and benefits of the above procedures. The risk of bleeding, infection and damage to other organs was reviewed. The possibility of not achieving amenorrhea was discussed.  No guarantees were made.  She did express understanding and desires to proceed with surgery.          Letter will be sent to the referring provider.    Will Stanford MD  20 of 20 minutes spent Face to face counseling relative to the issues noted above.

## 2022-01-26 NOTE — TELEPHONE ENCOUNTER
"[id number]  Kesha Carter    You are now scheduled for surgery at [facility].  Below are the details for your surgery.  Please read the \"Preparing for Your Surgery\" instructions and let us know if you have any questions.    Type of surgery: HYSTEROSCOPY, WITH DILATION AND CURETTAGE OF UTERUS, AND ENDOMETRIAL ABLATION (Bilateral)    Surgeon:  Will Stanford MD  Location of surgery: [facility] OR    Date of surgery: 2-7-22    Time: 10:45am  Arrival Time: 9:45am    Time can change, to be confirmed a couple of days prior by pre-op surgery nurse.    Pre-Op Appt Date: Patient to schedule with a PCP or Family Practice Provider within 30 days to the surgery.  Post-Op Appt Date: To be determined by provider     COVID test 2-4 days prior at [facility]  Date 2-4-22  Time 3:30pm    Packet sent out: Yes  Pre-cert/Authorization completed:  TBD by Financial Securing Office.   MA Sterilization/Hysterectomy Acknowledgment Consent signed: Not Applicable    [facility]  [phone]    Fax: [phone]  Same Day Surgery [phone]  Fax: [phone]  [facility] [phone]    "

## 2022-01-27 ENCOUNTER — OFFICE VISIT (OUTPATIENT)
Dept: FAMILY MEDICINE | Facility: CLINIC | Age: 42
End: 2022-01-27
Payer: COMMERCIAL

## 2022-01-27 VITALS
RESPIRATION RATE: 16 BRPM | BODY MASS INDEX: 23.32 KG/M2 | TEMPERATURE: 97 F | HEIGHT: 66 IN | WEIGHT: 145.1 LBS | HEART RATE: 62 BPM | DIASTOLIC BLOOD PRESSURE: 70 MMHG | OXYGEN SATURATION: 100 % | SYSTOLIC BLOOD PRESSURE: 112 MMHG

## 2022-01-27 DIAGNOSIS — Z11.59 ENCOUNTER FOR SCREENING FOR OTHER VIRAL DISEASES: Primary | ICD-10-CM

## 2022-01-27 DIAGNOSIS — N92.1 MENORRHAGIA WITH IRREGULAR CYCLE: ICD-10-CM

## 2022-01-27 DIAGNOSIS — Z01.818 PREOP GENERAL PHYSICAL EXAM: Primary | ICD-10-CM

## 2022-01-27 LAB
ERYTHROCYTE [DISTWIDTH] IN BLOOD BY AUTOMATED COUNT: 12.2 % (ref 10–15)
HCG UR QL: NEGATIVE
HCT VFR BLD AUTO: 41.5 % (ref 35–47)
HGB BLD-MCNC: 13.5 G/DL (ref 11.7–15.7)
MCH RBC QN AUTO: 30.7 PG (ref 26.5–33)
MCHC RBC AUTO-ENTMCNC: 32.5 G/DL (ref 31.5–36.5)
MCV RBC AUTO: 94 FL (ref 78–100)
PLATELET # BLD AUTO: 319 10E3/UL (ref 150–450)
RBC # BLD AUTO: 4.4 10E6/UL (ref 3.8–5.2)
WBC # BLD AUTO: 10 10E3/UL (ref 4–11)

## 2022-01-27 PROCEDURE — 81025 URINE PREGNANCY TEST: CPT | Performed by: FAMILY MEDICINE

## 2022-01-27 PROCEDURE — 85027 COMPLETE CBC AUTOMATED: CPT | Performed by: FAMILY MEDICINE

## 2022-01-27 PROCEDURE — 99214 OFFICE O/P EST MOD 30 MIN: CPT | Performed by: FAMILY MEDICINE

## 2022-01-27 PROCEDURE — 36415 COLL VENOUS BLD VENIPUNCTURE: CPT | Performed by: FAMILY MEDICINE

## 2022-01-27 RX ORDER — CLONAZEPAM 1 MG/1
TABLET ORAL
COMMUNITY
Start: 2022-01-21

## 2022-01-27 ASSESSMENT — PAIN SCALES - GENERAL: PAINLEVEL: NO PAIN (0)

## 2022-01-27 ASSESSMENT — MIFFLIN-ST. JEOR: SCORE: 1333.89

## 2022-01-27 NOTE — PROGRESS NOTES
[facility]  [address] [facility] [address]  Phone: [phone]  Primary Provider: Pia Mckinley  Pre-op Performing Provider: PIA CMKINLEY    PREOPERATIVE EVALUATION:  Today's date: 1/27/2022    Kesha Carter is a 41 year old female who presents for a preoperative evaluation.    Surgical Information:  Surgery/Procedure: Hysteroscopy with dilation and curettage of uterus and endometrial ablation  Surgery Location: [facility]  Surgeon: Will Stanford  Surgery Date: 2/7/2022  Time of Surgery: TBD  Where patient plans to recover: At home with family  Fax number for surgical facility: Note does not need to be faxed, will be available electronically in Epic.    Type of Anesthesia Anticipated: Local with MAC    Assessment & Plan     The proposed surgical procedure is considered LOW risk.    Preop general physical exam      Menorrhagia with irregular cycle    - CBC with platelets; Future  - HCG Qual, Urine ([id number]); Future         Risks and Recommendations:  The patient has the following additional risks and recommendations for perioperative complications:   - No identified additional risk factors other than previously addressed    Medication Instructions:   - lithium: Check lithium level. Continue without modification.     RECOMMENDATION:  APPROVAL GIVEN to proceed with proposed procedure, without further diagnostic evaluation.                Subjective     HPI related to upcoming procedure: heavy periods     Preop Questions 1/27/2022   1. Have you ever had a heart attack or stroke? No   2. Have you ever had surgery on your heart or blood vessels, such as a stent placement, a coronary artery bypass, or surgery on an artery in your head, neck, heart, or legs? No   3. Do you have chest pain with activity? No   4. Do you have a history of  heart failure? No   5. Do you currently have a cold, bronchitis or symptoms of other infection? No   6. Do you have  a cough, shortness of breath, or wheezing? No   7. Do you or anyone in your family have previous history of blood clots? UNKNOWN -    8. Do you or does anyone in your family have a serious bleeding problem such as prolonged bleeding following surgeries or cuts? No   9. Have you ever had problems with anemia or been told to take iron pills? No   10. Have you had any abnormal blood loss such as black, tarry or bloody stools, or abnormal vaginal bleeding? YES - right now    11. Have you ever had a blood transfusion? No   12. Are you willing to have a blood transfusion if it is medically needed before, during, or after your surgery? Yes   13. Have you or any of your relatives ever had problems with anesthesia? No   14. Do you have sleep apnea, excessive snoring or daytime drowsiness? No   15. Do you have any artifical heart valves or other implanted medical devices like a pacemaker, defibrillator, or continuous glucose monitor? No   16. Do you have artificial joints? No   17. Are you allergic to latex? No   18. Is there any chance that you may be pregnant? No       Health Care Directive:  Patient does not have a Health Care Directive or Living Will: Discussed advance care planning with patient; information given to patient to review.    Preoperative Review of :   reviewed - controlled substances reflected in medication list.      Status of Chronic Conditions:  See problem list for active medical problems.  Problems all longstanding and stable, except as noted/documented.  See ROS for pertinent symptoms related to these conditions.      Review of Systems  Constitutional, neuro, ENT, endocrine, pulmonary, cardiac, gastrointestinal, genitourinary, musculoskeletal, integument and psychiatric systems are negative, except as otherwise noted.    Patient Active Problem List    Diagnosis Date Noted     Irritable bowel syndrome with both constipation and diarrhea 07/19/2019     Priority: Medium     and upper gastric  accomodation, per [place] 2019        Hypothyroidism 05/10/2013     Priority: Medium     Bipolar disorder, in full remission, most recent episode manic (H) 2013     Priority: Medium     Renuka Giron phd         Adjustment disorder with anxiety 2012     Priority: Medium     CARDIOVASCULAR SCREENING; LDL GOAL LESS THAN 160 2011     Priority: Medium     FAMILY HISTORY OF ENDOCRINE DISEASE( other endo or metabol) 2010     Priority: Medium      Past Medical History:   Diagnosis Date     Thyroid disease      Past Surgical History:   Procedure Laterality Date     C/SECTION, LOW TRANSVERSE      , Low Transverse     C/SECTION, LOW TRANSVERSE      , Low Transverse     LAPAROSCOPIC CHOLECYSTECTOMY N/A 2017    Procedure: LAPAROSCOPIC CHOLECYSTECTOMY;  Surgeon: Tani Brenner MD;  Location: [facility] OR     MAMMOPLASTY AUGMENTATION BILATERAL  2010    Implants.     TUBAL LIGATION      With C/S     ZZC REPAIR CRUCIATE LIGAMENT,KNEE      Left acl repair     Current Outpatient Medications   Medication Sig Dispense Refill     ammonium lactate (LAC-HYDRIN) 12 % cream Apply topically 2 times daily 120 g 11     clonazePAM (KLONOPIN) 0.5 MG tablet Take 0.5-1 tablets (0.25-0.5 mg) by mouth nightly as needed for other (insomnia) 20 tablet 0     cyclobenzaprine (FLEXERIL) 5 MG tablet TAKE 1 TO 2 TABLETS(5 TO 10 MG) BY MOUTH EVERY NIGHT AS NEEDED FOR MUSCLE SPASMS 60 tablet 3     levothyroxine (SYNTHROID/LEVOTHROID) 112 MCG tablet Take 1 tablet (112 mcg) by mouth daily 90 tablet 2     lithium 600 MG capsule        multivitamin, therapeutic with minerals (MULTI-VITAMIN) TABS tablet Take 1 tablet by mouth daily 100 tablet 3     norgestimate-ethinyl estradiol (ESTARYLLA) 0.25-35 MG-MCG tablet TAKE 1 ACTIVE PILL DAILY FOR 12 WEEKS, SKIP PLACEBO PILLS 112 tablet 4     ondansetron 8 MG PO ODT tab Take 1 tablet (8 mg) by mouth every 8 hours as needed for nausea 16 tablet 0  "    polyethylene glycol (MIRALAX) powder Take 1 capful by mouth daily       Probiotic Product (PROBIOTIC DAILY PO)        scopolamine 1 MG/3DAYS TD 72 hr patch Apply 1 patch to hairless area behind one ear at least 4 hours before travel.  Remove old patch and change every 3 days (72 hours). 3 patch 1     spironolactone (ALDACTONE) 50 MG tablet Take 1 tablet (50 mg) by mouth daily 90 tablet 3     temazepam (RESTORIL) 15 MG capsule  30 capsule      ZOLMitriptan (ZOMIG) 5 MG tablet TAKE 1 TABLET(5 MG) BY MOUTH AT ONSET OF HEADACHE FOR MIGRAINE. MAY REPEAT IN 2 HOURS. MAX 2 TABLETS/ 24 HOURS 6 tablet 1     cholestyramine (QUESTRAN) 4 GM/DOSE powder Take 2 g by mouth 3 times daily (with meals) Take as directed to control GI symptoms (Patient not taking: Reported on 2022) 540 g 3     clonazePAM (KLONOPIN) 1 MG tablet  (Patient not taking: Reported on 2022)         Allergies   Allergen Reactions     Erythromycin Nausea and Vomiting     Other [No Clinical Screening - See Comments]      Hives from a contrast dye given when had a CT scan. Patient unsure of name.      Liquid Adhesive Itching and Rash        Social History     Tobacco Use     Smoking status: Former Smoker     Packs/day: 0.00     Years: 0.00     Pack years: 0.00     Types: Cigarettes     Quit date: 2016     Years since quittin.4     Smokeless tobacco: Never Used   Substance Use Topics     Alcohol use: Not Currently       History   Drug Use No         Objective     /70 (BP Location: Right arm, Patient Position: Sitting, Cuff Size: Adult Regular)   Pulse 62   Temp 97  F (36.1  C) (Tympanic)   Resp 16   Ht 1.683 m (5' 6.25\")   Wt 65.8 kg (145 lb 1.6 oz)   LMP 2022   SpO2 100%   Breastfeeding No   BMI 23.24 kg/m      Physical Exam    GENERAL APPEARANCE: healthy, alert and no distress     EYES: EOMI, PERRL     HENT: ear canals and TM's normal and nose and mouth without ulcers or lesions     NECK: no adenopathy, no asymmetry, " masses, or scars and thyroid normal to palpation     RESP: lungs clear to auscultation - no rales, rhonchi or wheezes     CV: regular rates and rhythm, normal S1 S2, no S3 or S4 and no murmur, click or rub     ABDOMEN:  soft, nontender, no HSM or masses and bowel sounds normal     MS: extremities normal- no gross deformities noted, no evidence of inflammation in joints, FROM in all extremities.     SKIN: no suspicious lesions or rashes     NEURO: Normal strength and tone, sensory exam grossly normal, mentation intact and speech normal     PSYCH: mentation appears normal. and affect normal/bright    Recent Labs   Lab Test 10/20/21  0805 10/20/21  0755 05/21/21  0833 05/05/21  0859   HGB  --   --  12.9 14.1   PLT  --   --  315 278    139  --   --    POTASSIUM 3.9 3.7  --   --    CR 0.79 0.83  --   --         Diagnostics:  Labs pending at this time.  Results will be reviewed when available.   No EKG required for low risk surgery (cataract, skin procedure, breast biopsy, etc).    Revised Cardiac Risk Index (RCRI):  The patient has the following serious cardiovascular risks for perioperative complications:   - No serious cardiac risks = 0 points     RCRI Interpretation: 0 points: Class I (very low risk - 0.4% complication rate)           Signed Electronically by: Pia Mar MD  Copy of this evaluation report is provided to requesting physician.

## 2022-01-27 NOTE — PATIENT INSTRUCTIONS
Preparing for Your Surgery  Getting started  A nurse will call you to review your health history and instructions. They will give you an arrival time based on your scheduled surgery time. Please be ready to share:    Your doctor's clinic name and phone number    Your medical, surgical and anesthesia history    A list of allergies and sensitivities    A list of medicines, including herbal treatments and over-the-counter drugs    Whether the patient has a legal guardian (ask how to send us the papers in advance)  Please tell us if you're pregnant--or if there's any chance you might be pregnant. Some surgeries may injure a fetus (unborn baby), so they require a pregnancy test. Surgeries that are safe for a fetus don't always need a test, and you can choose whether to have one.   If you have a child who's having surgery, please ask for a copy of Preparing for Your Child's Surgery.    Preparing for surgery    Within 30 days of surgery: Have a pre-op exam (sometimes called an H&P, or History and Physical). This can be done at a clinic or pre-operative center.  ? If you're having a , you may not need this exam. Talk to your care team.    At your pre-op exam, talk to your care team about all medicines you take. If you need to stop any medicines before surgery, ask when to start taking them again.  ? We do this for your safety. Many medicines can make you bleed too much during surgery. Some change how well surgery (anesthesia) drugs work.    Call your insurance company to let them know you're having surgery. (If you don't have insurance, call [phone].)    Call your clinic if there's any change in your health. This includes signs of a cold or flu (sore throat, runny nose, cough, rash, fever). It also includes a scrape or scratch near the surgery site.    If you have questions on the day of surgery, call your hospital or surgery center.  COVID testing  You may need to be tested for COVID-19 before having  surgery. If so, your surgical team will give you instructions for scheduling this test, separate from your preoperative history and physical.  Eating and drinking guidelines  For your safety: Unless your surgeon tells you otherwise, follow the guidelines below.    Eat and drink as usual until 8 hours before surgery. After that, no food or milk.    Drink clear liquids until 2 hours before surgery. These are liquids you can see through, like water, Gatorade and Propel Water. You may also have black coffee and tea (no cream or milk).    Nothing by mouth within 2 hours of surgery. This includes gum, candy and breath mints.    If you drink alcohol: Stop drinking it the night before surgery.    If your care team tells you to take medicine on the morning of surgery, it's okay to take it with a sip of water.  Preventing infection    Shower or bathe the night before and morning of your surgery. Follow the instructions your clinic gave you. (If no instructions, use regular soap.)    Don't shave or clip hair near your surgery site. We'll remove the hair if needed.    Don't smoke or vape the morning of surgery. You may chew nicotine gum up to 2 hours before surgery. A nicotine patch is okay.  ? Note: Some surgeries require you to completely quit smoking and nicotine. Check with your surgeon.    Your care team will make every effort to keep you safe from infection. We will:  ? Clean our hands often with soap and water (or an alcohol-based hand rub).  ? Clean the skin at your surgery site with a special soap that kills germs.  ? Give you a special gown to keep you warm. (Cold raises the risk of infection.)  ? Wear special hair covers, masks, gowns and gloves during surgery.  ? Give antibiotic medicine, if prescribed. Not all surgeries need antibiotics.  What to bring on the day of surgery    Photo ID and insurance card    Copy of your health care directive, if you have one    Glasses and hearing aides (bring cases)  ? You can't  wear contacts during surgery    Inhaler and eye drops, if you use them (tell us about these when you arrive)    CPAP machine or breathing device, if you use them    A few personal items, if spending the night    If you have . . .  ? A pacemaker, ICD (cardiac defibrillator) or other implant: Bring the ID card.  ? An implanted stimulator: Bring the remote control.  ? A legal guardian: Bring a copy of the certified (court-stamped) guardianship papers.  Please remove any jewelry, including body piercings. Leave jewelry and other valuables at home.  If you're going home the day of surgery    You must have a responsible adult drive you home. They should stay with you overnight as well.    If you don't have someone to stay with you, and you aren't safe to go home alone, we may keep you overnight. Insurance often won't pay for this.  After surgery  If it's hard to control your pain or you need more pain medicine, please call your surgeon's office.  Questions?   If you have any questions for your care team, list them here: _________________________________________________________________________________________________________________________________________________________________________ ____________________________________ ____________________________________ ____________________________________  For informational purposes only. Not to replace the advice of your health care provider. Copyright   2003, 2019 [facility]. All rights reserved. Clinically reviewed by Maria Esther Welch MD. [email or web address] [id number] - REV 07/21.

## 2022-02-04 ENCOUNTER — ANESTHESIA EVENT (OUTPATIENT)
Dept: SURGERY | Facility: CLINIC | Age: 42
End: 2022-02-04
Payer: COMMERCIAL

## 2022-02-04 ENCOUNTER — LAB (OUTPATIENT)
Dept: LAB | Facility: CLINIC | Age: 42
End: 2022-02-04
Payer: COMMERCIAL

## 2022-02-04 DIAGNOSIS — Z11.59 ENCOUNTER FOR SCREENING FOR OTHER VIRAL DISEASES: ICD-10-CM

## 2022-02-04 PROCEDURE — U0003 INFECTIOUS AGENT DETECTION BY NUCLEIC ACID (DNA OR RNA); SEVERE ACUTE RESPIRATORY SYNDROME CORONAVIRUS 2 (SARS-COV-2) (CORONAVIRUS DISEASE [COVID-19]), AMPLIFIED PROBE TECHNIQUE, MAKING USE OF HIGH THROUGHPUT TECHNOLOGIES AS DESCRIBED BY CMS-2020-01-R: HCPCS

## 2022-02-04 PROCEDURE — U0005 INFEC AGEN DETEC AMPLI PROBE: HCPCS

## 2022-02-05 LAB — SARS-COV-2 RNA RESP QL NAA+PROBE: NEGATIVE

## 2022-02-07 ENCOUNTER — ANESTHESIA (OUTPATIENT)
Dept: SURGERY | Facility: CLINIC | Age: 42
End: 2022-02-07
Payer: COMMERCIAL

## 2022-02-07 ENCOUNTER — HOSPITAL ENCOUNTER (OUTPATIENT)
Facility: CLINIC | Age: 42
Discharge: HOME | End: 2022-02-07
Attending: OBSTETRICS & GYNECOLOGY | Admitting: OBSTETRICS & GYNECOLOGY
Payer: COMMERCIAL

## 2022-02-07 VITALS
TEMPERATURE: 97.7 F | HEIGHT: 66 IN | DIASTOLIC BLOOD PRESSURE: 80 MMHG | SYSTOLIC BLOOD PRESSURE: 115 MMHG | OXYGEN SATURATION: 98 % | BODY MASS INDEX: 23.3 KG/M2 | RESPIRATION RATE: 16 BRPM | HEART RATE: 78 BPM | WEIGHT: 145 LBS

## 2022-02-07 DIAGNOSIS — Z98.890 S/P ENDOMETRIAL ABLATION: Primary | ICD-10-CM

## 2022-02-07 LAB — HCG UR QL: NEGATIVE

## 2022-02-07 PROCEDURE — 250N000013 HC RX MED GY IP 250 OP 250 PS 637: Performed by: OBSTETRICS & GYNECOLOGY

## 2022-02-07 PROCEDURE — 250N000009 HC RX 250: Performed by: NURSE ANESTHETIST, CERTIFIED REGISTERED

## 2022-02-07 PROCEDURE — C1888 ENDOVAS NON-CARDIAC ABL CATH: HCPCS | Performed by: OBSTETRICS & GYNECOLOGY

## 2022-02-07 PROCEDURE — 710N000012 HC RECOVERY PHASE 2, PER MINUTE: Performed by: OBSTETRICS & GYNECOLOGY

## 2022-02-07 PROCEDURE — 250N000009 HC RX 250: Performed by: OBSTETRICS & GYNECOLOGY

## 2022-02-07 PROCEDURE — 360N000076 HC SURGERY LEVEL 3, PER MIN: Performed by: OBSTETRICS & GYNECOLOGY

## 2022-02-07 PROCEDURE — 88305 TISSUE EXAM BY PATHOLOGIST: CPT | Mod: TC | Performed by: OBSTETRICS & GYNECOLOGY

## 2022-02-07 PROCEDURE — 370N000017 HC ANESTHESIA TECHNICAL FEE, PER MIN: Performed by: OBSTETRICS & GYNECOLOGY

## 2022-02-07 PROCEDURE — 999N000141 HC STATISTIC PRE-PROCEDURE NURSING ASSESSMENT: Performed by: OBSTETRICS & GYNECOLOGY

## 2022-02-07 PROCEDURE — 81025 URINE PREGNANCY TEST: CPT | Performed by: OBSTETRICS & GYNECOLOGY

## 2022-02-07 PROCEDURE — 271N000001 HC OR GENERAL SUPPLY NON-STERILE: Performed by: OBSTETRICS & GYNECOLOGY

## 2022-02-07 PROCEDURE — 250N000013 HC RX MED GY IP 250 OP 250 PS 637: Performed by: NURSE ANESTHETIST, CERTIFIED REGISTERED

## 2022-02-07 PROCEDURE — 258N000003 HC RX IP 258 OP 636: Performed by: NURSE ANESTHETIST, CERTIFIED REGISTERED

## 2022-02-07 PROCEDURE — 272N000001 HC OR GENERAL SUPPLY STERILE: Performed by: OBSTETRICS & GYNECOLOGY

## 2022-02-07 PROCEDURE — 250N000011 HC RX IP 250 OP 636: Performed by: NURSE ANESTHETIST, CERTIFIED REGISTERED

## 2022-02-07 PROCEDURE — 58563 HYSTEROSCOPY ABLATION: CPT | Performed by: OBSTETRICS & GYNECOLOGY

## 2022-02-07 PROCEDURE — 250N000011 HC RX IP 250 OP 636: Performed by: OBSTETRICS & GYNECOLOGY

## 2022-02-07 RX ORDER — SODIUM CHLORIDE, SODIUM LACTATE, POTASSIUM CHLORIDE, CALCIUM CHLORIDE 600; 310; 30; 20 MG/100ML; MG/100ML; MG/100ML; MG/100ML
INJECTION, SOLUTION INTRAVENOUS CONTINUOUS
Status: DISCONTINUED | OUTPATIENT
Start: 2022-02-07 | End: 2022-02-07 | Stop reason: HOSPADM

## 2022-02-07 RX ORDER — GABAPENTIN 300 MG/1
300 CAPSULE ORAL
Status: COMPLETED | OUTPATIENT
Start: 2022-02-07 | End: 2022-02-07

## 2022-02-07 RX ORDER — HYDROMORPHONE HCL IN WATER/PF 6 MG/30 ML
0.4 PATIENT CONTROLLED ANALGESIA SYRINGE INTRAVENOUS EVERY 5 MIN PRN
Status: DISCONTINUED | OUTPATIENT
Start: 2022-02-07 | End: 2022-02-07 | Stop reason: HOSPADM

## 2022-02-07 RX ORDER — NALOXONE HYDROCHLORIDE 0.4 MG/ML
0.2 INJECTION, SOLUTION INTRAMUSCULAR; INTRAVENOUS; SUBCUTANEOUS
Status: DISCONTINUED | OUTPATIENT
Start: 2022-02-07 | End: 2022-02-07 | Stop reason: HOSPADM

## 2022-02-07 RX ORDER — ACETAMINOPHEN 325 MG/1
975 TABLET ORAL ONCE
Status: DISCONTINUED | OUTPATIENT
Start: 2022-02-07 | End: 2022-02-07

## 2022-02-07 RX ORDER — LIDOCAINE HYDROCHLORIDE 10 MG/ML
INJECTION, SOLUTION INFILTRATION; PERINEURAL PRN
Status: DISCONTINUED | OUTPATIENT
Start: 2022-02-07 | End: 2022-02-07

## 2022-02-07 RX ORDER — PROPOFOL 10 MG/ML
INJECTION, EMULSION INTRAVENOUS PRN
Status: DISCONTINUED | OUTPATIENT
Start: 2022-02-07 | End: 2022-02-07

## 2022-02-07 RX ORDER — FENTANYL CITRATE 50 UG/ML
50 INJECTION, SOLUTION INTRAMUSCULAR; INTRAVENOUS EVERY 5 MIN PRN
Status: DISCONTINUED | OUTPATIENT
Start: 2022-02-07 | End: 2022-02-07 | Stop reason: HOSPADM

## 2022-02-07 RX ORDER — ACETAMINOPHEN 325 MG/1
975 TABLET ORAL ONCE
Status: CANCELLED | OUTPATIENT
Start: 2022-02-07 | End: 2022-02-07

## 2022-02-07 RX ORDER — ONDANSETRON 2 MG/ML
INJECTION INTRAMUSCULAR; INTRAVENOUS PRN
Status: DISCONTINUED | OUTPATIENT
Start: 2022-02-07 | End: 2022-02-07

## 2022-02-07 RX ORDER — LIDOCAINE 40 MG/G
CREAM TOPICAL
Status: DISCONTINUED | OUTPATIENT
Start: 2022-02-07 | End: 2022-02-07 | Stop reason: HOSPADM

## 2022-02-07 RX ORDER — FENTANYL CITRATE 50 UG/ML
25 INJECTION, SOLUTION INTRAMUSCULAR; INTRAVENOUS
Status: DISCONTINUED | OUTPATIENT
Start: 2022-02-07 | End: 2022-02-07 | Stop reason: HOSPADM

## 2022-02-07 RX ORDER — NALOXONE HYDROCHLORIDE 0.4 MG/ML
0.4 INJECTION, SOLUTION INTRAMUSCULAR; INTRAVENOUS; SUBCUTANEOUS
Status: DISCONTINUED | OUTPATIENT
Start: 2022-02-07 | End: 2022-02-07 | Stop reason: HOSPADM

## 2022-02-07 RX ORDER — OXYCODONE HYDROCHLORIDE 5 MG/1
5 TABLET ORAL EVERY 4 HOURS PRN
Status: DISCONTINUED | OUTPATIENT
Start: 2022-02-07 | End: 2022-02-07 | Stop reason: HOSPADM

## 2022-02-07 RX ORDER — HYDROXYZINE HYDROCHLORIDE 50 MG/1
50 TABLET, FILM COATED ORAL EVERY 6 HOURS PRN
Status: DISCONTINUED | OUTPATIENT
Start: 2022-02-07 | End: 2022-02-07 | Stop reason: HOSPADM

## 2022-02-07 RX ORDER — ONDANSETRON 4 MG/1
4 TABLET, ORALLY DISINTEGRATING ORAL EVERY 30 MIN PRN
Status: DISCONTINUED | OUTPATIENT
Start: 2022-02-07 | End: 2022-02-07 | Stop reason: HOSPADM

## 2022-02-07 RX ORDER — PROPOFOL 10 MG/ML
INJECTION, EMULSION INTRAVENOUS CONTINUOUS PRN
Status: DISCONTINUED | OUTPATIENT
Start: 2022-02-07 | End: 2022-02-07

## 2022-02-07 RX ORDER — ACETAMINOPHEN 325 MG/1
975 TABLET ORAL ONCE
Status: COMPLETED | OUTPATIENT
Start: 2022-02-07 | End: 2022-02-07

## 2022-02-07 RX ORDER — ACETAMINOPHEN 325 MG/1
975 TABLET ORAL ONCE
Status: DISCONTINUED | OUTPATIENT
Start: 2022-02-07 | End: 2022-02-07 | Stop reason: HOSPADM

## 2022-02-07 RX ORDER — IBUPROFEN 800 MG/1
800 TABLET, FILM COATED ORAL ONCE
Status: CANCELLED | OUTPATIENT
Start: 2022-02-07 | End: 2022-02-07

## 2022-02-07 RX ORDER — ONDANSETRON 2 MG/ML
4 INJECTION INTRAMUSCULAR; INTRAVENOUS EVERY 30 MIN PRN
Status: DISCONTINUED | OUTPATIENT
Start: 2022-02-07 | End: 2022-02-07 | Stop reason: HOSPADM

## 2022-02-07 RX ORDER — MEPERIDINE HYDROCHLORIDE 25 MG/ML
12.5 INJECTION INTRAMUSCULAR; INTRAVENOUS; SUBCUTANEOUS
Status: DISCONTINUED | OUTPATIENT
Start: 2022-02-07 | End: 2022-02-07 | Stop reason: HOSPADM

## 2022-02-07 RX ORDER — HYDROXYZINE HYDROCHLORIDE 25 MG/1
25 TABLET, FILM COATED ORAL EVERY 6 HOURS PRN
Status: DISCONTINUED | OUTPATIENT
Start: 2022-02-07 | End: 2022-02-07 | Stop reason: HOSPADM

## 2022-02-07 RX ORDER — IBUPROFEN 200 MG
800 TABLET ORAL EVERY 6 HOURS PRN
COMMUNITY
Start: 2022-02-07 | End: 2024-08-08

## 2022-02-07 RX ORDER — FENTANYL CITRATE 50 UG/ML
INJECTION, SOLUTION INTRAMUSCULAR; INTRAVENOUS PRN
Status: DISCONTINUED | OUTPATIENT
Start: 2022-02-07 | End: 2022-02-07

## 2022-02-07 RX ORDER — DIMENHYDRINATE 50 MG/ML
25 INJECTION, SOLUTION INTRAMUSCULAR; INTRAVENOUS
Status: DISCONTINUED | OUTPATIENT
Start: 2022-02-07 | End: 2022-02-07 | Stop reason: HOSPADM

## 2022-02-07 RX ORDER — DEXAMETHASONE SODIUM PHOSPHATE 4 MG/ML
INJECTION, SOLUTION INTRA-ARTICULAR; INTRALESIONAL; INTRAMUSCULAR; INTRAVENOUS; SOFT TISSUE PRN
Status: DISCONTINUED | OUTPATIENT
Start: 2022-02-07 | End: 2022-02-07

## 2022-02-07 RX ORDER — IBUPROFEN 400 MG/1
800 TABLET, FILM COATED ORAL ONCE
Status: DISCONTINUED | OUTPATIENT
Start: 2022-02-07 | End: 2022-02-07 | Stop reason: HOSPADM

## 2022-02-07 RX ORDER — KETOROLAC TROMETHAMINE 30 MG/ML
30 INJECTION, SOLUTION INTRAMUSCULAR; INTRAVENOUS ONCE
Status: COMPLETED | OUTPATIENT
Start: 2022-02-07 | End: 2022-02-07

## 2022-02-07 RX ORDER — OXYCODONE HYDROCHLORIDE 5 MG/1
5-10 TABLET ORAL EVERY 4 HOURS PRN
Qty: 6 TABLET | Refills: 0 | OUTPATIENT
Start: 2022-02-07 | End: 2024-08-08

## 2022-02-07 RX ORDER — OXYCODONE HYDROCHLORIDE 5 MG/1
5 TABLET ORAL
Status: CANCELLED | OUTPATIENT
Start: 2022-02-07

## 2022-02-07 RX ORDER — BUPIVACAINE HYDROCHLORIDE 5 MG/ML
INJECTION, SOLUTION PERINEURAL PRN
Status: DISCONTINUED | OUTPATIENT
Start: 2022-02-07 | End: 2022-02-07 | Stop reason: HOSPADM

## 2022-02-07 RX ORDER — OXYCODONE HYDROCHLORIDE 5 MG/1
5-10 TABLET ORAL EVERY 4 HOURS PRN
Qty: 6 TABLET | Refills: 0 | Status: SHIPPED | OUTPATIENT
Start: 2022-02-07 | End: 2022-10-11

## 2022-02-07 RX ORDER — OXYCODONE HYDROCHLORIDE 5 MG/1
5 TABLET ORAL
Status: DISCONTINUED | OUTPATIENT
Start: 2022-02-07 | End: 2022-02-07 | Stop reason: HOSPADM

## 2022-02-07 RX ORDER — KETAMINE HYDROCHLORIDE 10 MG/ML
INJECTION, SOLUTION INTRAMUSCULAR; INTRAVENOUS PRN
Status: DISCONTINUED | OUTPATIENT
Start: 2022-02-07 | End: 2022-02-07

## 2022-02-07 RX ORDER — OXYCODONE HCL 10 MG/1
10 TABLET, FILM COATED, EXTENDED RELEASE ORAL ONCE
Status: COMPLETED | OUTPATIENT
Start: 2022-02-07 | End: 2022-02-07

## 2022-02-07 RX ADMIN — ONDANSETRON 4 MG: 2 INJECTION INTRAMUSCULAR; INTRAVENOUS at 12:48

## 2022-02-07 RX ADMIN — LIDOCAINE HYDROCHLORIDE 50 MG: 10 INJECTION, SOLUTION INFILTRATION; PERINEURAL at 12:10

## 2022-02-07 RX ADMIN — DEXAMETHASONE SODIUM PHOSPHATE 8 MG: 4 INJECTION, SOLUTION INTRA-ARTICULAR; INTRALESIONAL; INTRAMUSCULAR; INTRAVENOUS; SOFT TISSUE at 12:18

## 2022-02-07 RX ADMIN — OXYCODONE HYDROCHLORIDE 10 MG: 10 TABLET, FILM COATED, EXTENDED RELEASE ORAL at 10:22

## 2022-02-07 RX ADMIN — FENTANYL CITRATE 50 MCG: 50 INJECTION, SOLUTION INTRAMUSCULAR; INTRAVENOUS at 12:48

## 2022-02-07 RX ADMIN — ACETAMINOPHEN 975 MG: 325 TABLET, FILM COATED ORAL at 10:22

## 2022-02-07 RX ADMIN — PROPOFOL 200 MCG/KG/MIN: 10 INJECTION, EMULSION INTRAVENOUS at 12:09

## 2022-02-07 RX ADMIN — GABAPENTIN 300 MG: 300 CAPSULE ORAL at 10:22

## 2022-02-07 RX ADMIN — KETAMINE HYDROCHLORIDE 20 MG: 10 INJECTION INTRAMUSCULAR; INTRAVENOUS at 12:11

## 2022-02-07 RX ADMIN — KETOROLAC TROMETHAMINE 30 MG: 30 INJECTION, SOLUTION INTRAMUSCULAR at 10:24

## 2022-02-07 RX ADMIN — MIDAZOLAM 2 MG: 1 INJECTION INTRAMUSCULAR; INTRAVENOUS at 12:06

## 2022-02-07 RX ADMIN — KETAMINE HYDROCHLORIDE 10 MG: 10 INJECTION INTRAMUSCULAR; INTRAVENOUS at 12:14

## 2022-02-07 RX ADMIN — LIDOCAINE HYDROCHLORIDE 0.1 ML: 10 INJECTION, SOLUTION EPIDURAL; INFILTRATION; INTRACAUDAL; PERINEURAL at 10:27

## 2022-02-07 RX ADMIN — FENTANYL CITRATE 50 MCG: 50 INJECTION, SOLUTION INTRAMUSCULAR; INTRAVENOUS at 12:15

## 2022-02-07 RX ADMIN — PROPOFOL 30 MG: 10 INJECTION, EMULSION INTRAVENOUS at 12:15

## 2022-02-07 RX ADMIN — SODIUM CHLORIDE, POTASSIUM CHLORIDE, SODIUM LACTATE AND CALCIUM CHLORIDE: 600; 310; 30; 20 INJECTION, SOLUTION INTRAVENOUS at 10:20

## 2022-02-07 ASSESSMENT — LIFESTYLE VARIABLES: TOBACCO_USE: 1

## 2022-02-07 ASSESSMENT — MIFFLIN-ST. JEOR: SCORE: 1333.44

## 2022-02-07 NOTE — DISCHARGE INSTRUCTIONS
Same Day Surgery Discharge Instructions  Special Precautions After Surgery - Adult    1. It is not unusual to feel lightheaded or faint, up to 24 hours after surgery or while taking pain medication.  If you have these symptoms; sit for a few minutes before standing and have someone assist you when getting up.  2. You should rest and relax for the next 24 hours and must have someone stay with you for at least 24 hours after your discharge.  3. DO NOT DRIVE any vehicle or operate mechanical equipment for 24 hours following the end of your surgery.  DO NOT DRIVE while taking narcotic pain medications that have been prescribed by your physician.  If you had a limb operated on, you must be able to use it fully to drive.  4. DO NOT drink alcoholic beverages for 24 hours following surgery or while taking prescription pain medication.  5. Drink clear liquids (apple juice, ginger ale, broth, 7-Up, etc.).  Progress to your regular diet as you feel able.  6. Any questions call your physician and do not make important decisions for 24 hours.      __________________________________________________________________________________________________________________________________  IMPORTANT NUMBERS:    [facility] Main Number:  [phone], [phone]  Pharmacy:  [phone]  Same Day Surgery:  [phone], Monday - Friday until 8:30 p.m.  Urgent Care:  [phone]  Emergency Room:  [phone]      OB Clinic:  [phone]   Nurse Advice Line: [phone]

## 2022-02-07 NOTE — ANESTHESIA CARE TRANSFER NOTE
Patient: Kesha Carter    Procedure: Procedure(s):  HYSTEROSCOPY, WITH DILATION AND CURETTAGE OF UTERUS, AND ENDOMETRIAL ABLATION USING MYOSURE       Diagnosis: Menorrhagia with irregular cycle [N92.1]  Diagnosis Additional Information: No value filed.    Anesthesia Type:   General     Note:    Oropharynx: oropharynx clear of all foreign objects and spontaneously breathing  Level of Consciousness: drowsy  Oxygen Supplementation: room air    Independent Airway: airway patency satisfactory and stable  Dentition: dentition unchanged  Vital Signs Stable: post-procedure vital signs reviewed and stable  Report to RN Given: handoff report given  Patient transferred to: Phase II    Handoff Report: Identifed the Patient, Identified the Reponsible Provider, Reviewed the pertinent medical history, Discussed the surgical course, Reviewed Intra-OP anesthesia mangement and issues during anesthesia, Set expectations for post-procedure period and Allowed opportunity for questions and acknowledgement of understanding      Vitals:  Vitals Value Taken Time   BP     Temp     Pulse     Resp     SpO2 98 % 02/07/22 1258   Vitals shown include unvalidated device data.    Electronically Signed By: Louie Nunez  February 7, 2022  12:59 PM

## 2022-02-07 NOTE — ANESTHESIA POSTPROCEDURE EVALUATION
Patient: Kesha Carter    Procedure: Procedure(s):  HYSTEROSCOPY, WITH DILATION AND CURETTAGE OF UTERUS, AND ENDOMETRIAL ABLATION USING MYOSURE       Diagnosis:Menorrhagia with irregular cycle [N92.1]  Diagnosis Additional Information: No value filed.    Anesthesia Type:  General    Note:  Disposition: Outpatient   Postop Pain Control: Uneventful            Sign Out: Well controlled pain   PONV: No   Neuro/Psych: Uneventful            Sign Out: Acceptable/Baseline neuro status   Airway/Respiratory: Uneventful            Sign Out: Acceptable/Baseline resp. status   CV/Hemodynamics: Uneventful            Sign Out: Acceptable CV status; No obvious hypovolemia; No obvious fluid overload   Other NRE: NONE   DID A NON-ROUTINE EVENT OCCUR? No           Last vitals:  Vitals Value Taken Time   /80 02/07/22 1330   Temp     Pulse 78 02/07/22 1330   Resp 16 02/07/22 1330   SpO2 97 % 02/07/22 1338   Vitals shown include unvalidated device data.    Electronically Signed By: Fuad Parker CRNA, APRN CRNA  February 7, 2022  1:50 PM

## 2022-02-07 NOTE — ANESTHESIA PREPROCEDURE EVALUATION
Anesthesia Pre-Procedure Evaluation    Patient: Kesha Carter   MRN: [id number] : 1980        Preoperative Diagnosis: Menorrhagia with irregular cycle [N92.1]    Procedure : Procedure(s):  HYSTEROSCOPY, WITH DILATION AND CURETTAGE OF UTERUS, AND ENDOMETRIAL ABLATION          Past Medical History:   Diagnosis Date     Thyroid disease       Past Surgical History:   Procedure Laterality Date     C/SECTION, LOW TRANSVERSE      , Low Transverse     C/SECTION, LOW TRANSVERSE      , Low Transverse     LAPAROSCOPIC CHOLECYSTECTOMY N/A 2017    Procedure: LAPAROSCOPIC CHOLECYSTECTOMY;  Surgeon: Tani Brenner MD;  Location: [facility] OR     MAMMOPLASTY AUGMENTATION BILATERAL  2010    Implants.     TUBAL LIGATION      With C/S     ZZC REPAIR CRUCIATE LIGAMENT,KNEE      Left acl repair      Allergies   Allergen Reactions     Erythromycin Nausea and Vomiting     Other [No Clinical Screening - See Comments]      Hives from a contrast dye given when had a CT scan. Patient unsure of name.      Liquid Adhesive Itching and Rash      Social History     Tobacco Use     Smoking status: Former Smoker     Packs/day: 0.00     Years: 0.00     Pack years: 0.00     Types: Cigarettes     Quit date: 2016     Years since quittin.5     Smokeless tobacco: Never Used   Substance Use Topics     Alcohol use: Not Currently      Wt Readings from Last 1 Encounters:   22 65.8 kg (145 lb)        Anesthesia Evaluation   Pt has had prior anesthetic. Type: General, Regional and MAC.    No history of anesthetic complications       ROS/MED HX  ENT/Pulmonary:     (+) tobacco use, Past use,     Neurologic:  - neg neurologic ROS     Cardiovascular:  - neg cardiovascular ROS     METS/Exercise Tolerance:     Hematologic:  - neg hematologic  ROS     Musculoskeletal:  - neg musculoskeletal ROS     GI/Hepatic:     (+) Inflammatory bowel disease,     Renal/Genitourinary:  - neg Renal ROS     Endo:      (+) thyroid problem, hypothyroidism,     Psychiatric/Substance Use:     (+) psychiatric history anxiety and bipolar     Infectious Disease:  - neg infectious disease ROS     Malignancy:  - neg malignancy ROS     Other:            Physical Exam    Airway        Mallampati: I   TM distance: > 3 FB   Neck ROM: full   Mouth opening: > 3 cm    Respiratory Devices and Support         Dental  no notable dental history         Cardiovascular   cardiovascular exam normal          Pulmonary   pulmonary exam normal                OUTSIDE LABS:  CBC:   Lab Results   Component Value Date    WBC 10.0 01/27/2022    WBC 7.4 05/21/2021    HGB 13.5 01/27/2022    HGB 12.9 05/21/2021    HCT 41.5 01/27/2022    HCT 38.8 05/21/2021     01/27/2022     05/21/2021     BMP:   Lab Results   Component Value Date     10/20/2021     10/20/2021    POTASSIUM 3.9 10/20/2021    POTASSIUM 3.7 10/20/2021    CHLORIDE 108 10/20/2021    CHLORIDE 108 10/20/2021    CO2 26 10/20/2021    CO2 24 10/20/2021    BUN 19 10/20/2021    BUN 20 10/20/2021    CR 0.79 10/20/2021    CR 0.83 10/20/2021    GLC 82 10/20/2021    GLC 78 10/20/2021     COAGS:   Lab Results   Component Value Date    INR 1.09 11/07/2011     POC:   Lab Results   Component Value Date    HCG Negative 02/07/2022     HEPATIC:   Lab Results   Component Value Date    ALBUMIN 3.8 10/11/2017    PROTTOTAL 7.9 10/11/2017    ALT 23 10/11/2017    AST 23 10/11/2017    ALKPHOS 61 10/11/2017    BILITOTAL 0.5 10/11/2017     OTHER:   Lab Results   Component Value Date    JAIDA 9.1 10/20/2021    PHOS 4.3 05/02/2014    LIPASE 197 10/11/2017    AMYLASE 49 10/11/2017    TSH 2.00 10/20/2021    T4 1.13 11/06/2018    CRP <2.9 06/19/2018    SED 9 06/19/2018       Anesthesia Plan    ASA Status:  1   NPO Status:  NPO Appropriate    Anesthesia Type: General.     - Airway: Native airway   Induction: Intravenous.   Maintenance: TIVA.        Consents    Anesthesia Plan(s) and associated risks,  benefits, and realistic alternatives discussed. Questions answered and patient/representative(s) expressed understanding.     - Discussed: Risks, Benefits and Alternatives for BOTH SEDATION and the PROCEDURE were discussed     - Discussed with:  Patient      - Extended Intubation/Ventilatory Support Discussed: No.      - Patient is DNR/DNI Status: No    Use of blood products discussed: No .     Postoperative Care    Pain management: IV analgesics, Oral pain medications.   PONV prophylaxis: Ondansetron (or other 5HT-3), Dexamethasone or Solumedrol     Comments:                Louie Nunez

## 2022-02-07 NOTE — OP NOTE
[facility] Gynecology  Operative Note    Pre-operative diagnosis: Menorrhagia with irregular cycle [N92.1] despite oral contraceptives  S/p tubal sterilization   Post-operative diagnosis: Same   Procedure: Dilation and curettage  Endometrial ablation  Hysteroscopy   Surgeon: Will Stanford MD   Assistant(s): None   Anesthesia: MAC (monitored anesthesia care) and Paracervical block:  .5% Buvicaine   Estimated blood loss: 10 ml   Total IV fluids: (See anesthesia record)  400ml   Blood transfusion: No transfusion was given during surgery   Total urine output: Not measured   Drains:  Distention media-normal saline None  Deficit 10 cc   Specimens: Endometrial polyp   Findings: Endometrial polyp   Complications: None   Condition: Stable   Comments: Kesha Carter   1980  [id number]      Kesha Carter  presented for the above procedure.  She has Heavy vaginal bleeding despite the use of oral contraceptives, is s/p tubal sterilization  I met with Kesha and discussed the planned procedure as well as the expected post operative course.  Risks of complications were noted and postoperative signs to watch for outlined.  Questions were answered and consent signed.  She was taken to the [facility] where she was placed in the supine position. She underwent monitored anesthesia.  She was then placed in the Dorso-lithotomy position in [facility].  An examination under anesthesia was performed that showed:anteverted  Anteflexed uterus  Was then prepped and draped  A timeout was held confirming her identity and proposed procedures. All were in agreement.   Uterus sounded to 8.5 cm and cervical length of 4.5 cm    A speculum was placed in the vagina and the cervix was isolated.  Paracervical block was placed with 0.5% bupivacaine total of 30 cc was utilized.  Uterus is sounded to 8.5 cm with some difficulty in dilation.  She is dilated to #8 Hegar dilator.  A MyoSure hysteroscope was  then placed.  A large lower uterine segment polyp was identified.  This was removed with use of the LigaSure morcellator revealing the remainder of the endometrium to be within normal limits.  Both tubal ostia were clearly visualized.  The cervix was measured at 4.5 cm.  The Mabel was then placed with a cavity length of 4 cm.  Cavity assessment was normal.  She underwent a two-minute ablation.  After removal of the Mabel repeat hysteroscopy showed excellent ablation.    At this point procedure was complete.  Sponge needle counts were correct x3.  Normal saline was used for distention media.  The deficit was measured at 10 cc.  She is awakened and taken to the PACU in good condition.    Will Stanford MD

## 2022-02-08 NOTE — ADDENDUM NOTE
Addendum  created 02/08/22 1608 by Freddy Miller APRN CRNA    Intraprocedure Event edited, Intraprocedure Staff edited

## 2022-02-09 LAB
PATH REPORT.COMMENTS IMP SPEC: NORMAL
PATH REPORT.COMMENTS IMP SPEC: NORMAL
PATH REPORT.FINAL DX SPEC: NORMAL
PATH REPORT.GROSS SPEC: NORMAL
PATH REPORT.MICROSCOPIC SPEC OTHER STN: NORMAL
PATH REPORT.RELEVANT HX SPEC: NORMAL
PHOTO IMAGE: NORMAL

## 2022-02-09 PROCEDURE — 88305 TISSUE EXAM BY PATHOLOGIST: CPT | Mod: 26 | Performed by: PATHOLOGY

## 2022-02-14 DIAGNOSIS — E03.4 HYPOTHYROIDISM DUE TO ACQUIRED ATROPHY OF THYROID: ICD-10-CM

## 2022-02-14 NOTE — TELEPHONE ENCOUNTER
Routing refill request to provider for review/approval because:  Multiple options for signing order   PCP to determine refill    Hans Lee RN

## 2022-02-15 RX ORDER — LEVOTHYROXINE SODIUM 112 MCG
TABLET ORAL
Qty: 90 TABLET | Refills: 1 | Status: SHIPPED | OUTPATIENT
Start: 2022-02-15 | End: 2022-04-25

## 2022-04-25 DIAGNOSIS — L70.0 ACNE VULGARIS: ICD-10-CM

## 2022-04-25 DIAGNOSIS — R45.4 IRRITABILITY: ICD-10-CM

## 2022-04-25 DIAGNOSIS — E03.4 HYPOTHYROIDISM DUE TO ACQUIRED ATROPHY OF THYROID: ICD-10-CM

## 2022-04-25 DIAGNOSIS — G43.839 MENSTRUAL MIGRAINE, INTRACTABLE, WITHOUT STATUS MIGRAINOSUS: ICD-10-CM

## 2022-04-25 NOTE — TELEPHONE ENCOUNTER
Routing refill request to provider for review/approval because:  Drug interaction warning  Thank you.  Jarred March RN

## 2022-04-26 RX ORDER — LEVOTHYROXINE SODIUM 112 MCG
TABLET ORAL
Qty: 90 TABLET | Refills: 1 | Status: SHIPPED | OUTPATIENT
Start: 2022-04-26 | End: 2022-10-11

## 2022-04-28 RX ORDER — NORGESTIMATE AND ETHINYL ESTRADIOL 0.25-0.035
KIT ORAL
Qty: 112 TABLET | Refills: 2 | Status: SHIPPED | OUTPATIENT
Start: 2022-04-28 | End: 2022-10-11

## 2022-05-16 ENCOUNTER — MYC MEDICAL ADVICE (OUTPATIENT)
Dept: FAMILY MEDICINE | Facility: CLINIC | Age: 42
End: 2022-05-16
Payer: COMMERCIAL

## 2022-05-16 DIAGNOSIS — Z87.898 HX OF MOTION SICKNESS: Primary | ICD-10-CM

## 2022-05-16 RX ORDER — SCOLOPAMINE TRANSDERMAL SYSTEM 1 MG/1
1 PATCH, EXTENDED RELEASE TRANSDERMAL
Qty: 4 PATCH | Refills: 1 | Status: SHIPPED | OUTPATIENT
Start: 2022-05-16 | End: 2023-06-13

## 2022-06-27 DIAGNOSIS — M54.59 MECHANICAL LOW BACK PAIN: ICD-10-CM

## 2022-06-27 NOTE — TELEPHONE ENCOUNTER
Routing refill request to provider for review/approval because:  Drug not on the [facility] refill protocol     Hans Lee RN

## 2022-06-29 RX ORDER — CYCLOBENZAPRINE HCL 5 MG
TABLET ORAL
Qty: 60 TABLET | Refills: 3 | Status: SHIPPED | OUTPATIENT
Start: 2022-06-29 | End: 2023-03-27

## 2022-08-28 ENCOUNTER — HEALTH MAINTENANCE LETTER (OUTPATIENT)
Age: 42
End: 2022-08-28

## 2022-10-11 ENCOUNTER — OFFICE VISIT (OUTPATIENT)
Dept: FAMILY MEDICINE | Facility: CLINIC | Age: 42
End: 2022-10-11
Payer: COMMERCIAL

## 2022-10-11 VITALS
SYSTOLIC BLOOD PRESSURE: 118 MMHG | TEMPERATURE: 98.3 F | DIASTOLIC BLOOD PRESSURE: 82 MMHG | HEART RATE: 60 BPM | BODY MASS INDEX: 24.75 KG/M2 | RESPIRATION RATE: 14 BRPM | OXYGEN SATURATION: 99 % | HEIGHT: 66 IN | WEIGHT: 154 LBS

## 2022-10-11 DIAGNOSIS — R45.4 IRRITABILITY: ICD-10-CM

## 2022-10-11 DIAGNOSIS — E03.4 HYPOTHYROIDISM DUE TO ACQUIRED ATROPHY OF THYROID: Primary | ICD-10-CM

## 2022-10-11 DIAGNOSIS — G43.839 MENSTRUAL MIGRAINE, INTRACTABLE, WITHOUT STATUS MIGRAINOSUS: ICD-10-CM

## 2022-10-11 DIAGNOSIS — J01.90 ACUTE SINUSITIS WITH SYMPTOMS > 10 DAYS: ICD-10-CM

## 2022-10-11 DIAGNOSIS — Z00.00 ROUTINE GENERAL MEDICAL EXAMINATION AT A HEALTH CARE FACILITY: ICD-10-CM

## 2022-10-11 DIAGNOSIS — F31.74 BIPOLAR DISORDER, IN FULL REMISSION, MOST RECENT EPISODE MANIC (H): ICD-10-CM

## 2022-10-11 DIAGNOSIS — L70.0 ACNE VULGARIS: ICD-10-CM

## 2022-10-11 LAB
ANION GAP SERPL CALCULATED.3IONS-SCNC: 8 MMOL/L (ref 7–15)
BUN SERPL-MCNC: 13.7 MG/DL (ref 6–20)
CALCIUM SERPL-MCNC: 9.3 MG/DL (ref 8.6–10)
CHLORIDE SERPL-SCNC: 102 MMOL/L (ref 98–107)
CREAT SERPL-MCNC: 0.86 MG/DL (ref 0.51–0.95)
DEPRECATED HCO3 PLAS-SCNC: 26 MMOL/L (ref 22–29)
GFR SERPL CREATININE-BSD FRML MDRD: 85 ML/MIN/1.73M2
GLUCOSE SERPL-MCNC: 86 MG/DL (ref 70–99)
LITHIUM SERPL-SCNC: 0.7 MMOL/L (ref 0.6–1.2)
POTASSIUM SERPL-SCNC: 4.2 MMOL/L (ref 3.4–5.3)
SODIUM SERPL-SCNC: 136 MMOL/L (ref 136–145)
TSH SERPL DL<=0.005 MIU/L-ACNC: 4.11 UIU/ML (ref 0.3–4.2)

## 2022-10-11 PROCEDURE — 36415 COLL VENOUS BLD VENIPUNCTURE: CPT | Performed by: FAMILY MEDICINE

## 2022-10-11 PROCEDURE — 80048 BASIC METABOLIC PNL TOTAL CA: CPT | Performed by: FAMILY MEDICINE

## 2022-10-11 PROCEDURE — 99214 OFFICE O/P EST MOD 30 MIN: CPT | Mod: 25 | Performed by: FAMILY MEDICINE

## 2022-10-11 PROCEDURE — 99396 PREV VISIT EST AGE 40-64: CPT | Performed by: FAMILY MEDICINE

## 2022-10-11 PROCEDURE — 84443 ASSAY THYROID STIM HORMONE: CPT | Performed by: FAMILY MEDICINE

## 2022-10-11 PROCEDURE — 80178 ASSAY OF LITHIUM: CPT | Performed by: FAMILY MEDICINE

## 2022-10-11 RX ORDER — SPIRONOLACTONE 50 MG/1
50 TABLET, FILM COATED ORAL DAILY
Qty: 90 TABLET | Refills: 4 | Status: SHIPPED | OUTPATIENT
Start: 2022-10-11 | End: 2023-11-26

## 2022-10-11 RX ORDER — LEVOTHYROXINE SODIUM 112 MCG
TABLET ORAL
Qty: 90 TABLET | Refills: 4 | Status: SHIPPED | OUTPATIENT
Start: 2022-10-11 | End: 2023-10-23

## 2022-10-11 RX ORDER — NORGESTIMATE AND ETHINYL ESTRADIOL 0.25-0.035
KIT ORAL
Qty: 112 TABLET | Refills: 4 | Status: SHIPPED | OUTPATIENT
Start: 2022-10-11 | End: 2023-12-19

## 2022-10-11 ASSESSMENT — ENCOUNTER SYMPTOMS
ABDOMINAL PAIN: 0
HEMATURIA: 0
CHILLS: 0
WEAKNESS: 1
FEVER: 0
HEMATOCHEZIA: 0
CONSTIPATION: 0
HEADACHES: 0
SORE THROAT: 0
ARTHRALGIAS: 0
HEARTBURN: 0
DYSURIA: 0
MYALGIAS: 0
JOINT SWELLING: 0
NAUSEA: 0
DIZZINESS: 0
SHORTNESS OF BREATH: 0
DIARRHEA: 0
EYE PAIN: 0
PALPITATIONS: 0
PARESTHESIAS: 0
FREQUENCY: 0
COUGH: 1
NERVOUS/ANXIOUS: 0

## 2022-10-11 ASSESSMENT — PAIN SCALES - GENERAL: PAINLEVEL: MODERATE PAIN (4)

## 2022-10-11 NOTE — PATIENT INSTRUCTIONS
Preventive Health Recommendations  Female Ages 40 to 49    Yearly exam:   See your health care provider every year in order to  Review health changes.   Discuss preventive care.    Review your medicines if your doctor prescribed any.    Get a Pap test every three years (unless you have an abnormal result and your provider advises testing more often).    If you get Pap tests with HPV test, you only need to test every 5 years, unless you have an abnormal result. You do not need a Pap test if your uterus was removed (hysterectomy) and you have not had cancer.    You should be tested each year for STDs (sexually transmitted diseases), if you're at risk.   Ask your doctor if you should have a mammogram.    Have a colonoscopy (test for colon cancer) if someone in your family has had colon cancer or polyps before age 50.     Have a cholesterol test every 5 years.     Have a diabetes test (fasting glucose) after age 45. If you are at risk for diabetes, you should have this test every 3 years.    Shots: Get a flu shot each year. Get a tetanus shot every 10 years.     Nutrition:   Eat at least 5 servings of fruits and vegetables each day.  Eat whole-grain bread, whole-wheat pasta and brown rice instead of white grains and rice.  Get adequate Calcium and Vitamin D.      Lifestyle  Exercise at least 150 minutes a week (an average of 30 minutes a day, 5 days a week). This will help you control your weight and prevent disease.  Limit alcohol to one drink per day.  No smoking.   Wear sunscreen to prevent skin cancer.  See your dentist every six months for an exam and cleaning.    Sinus pressure is primarily a problem of drainage.  You can best help your body clear the sinus secretions and pressure by opening up the natural passageways which are often blocked by viral colds and allergies.      Short courses of a nasal decongestant spray (Afrin or Neosinephrine) are one of the most effective tools in opening sinus drainage  passageways.  Their use should be restricted to 3 days though due to the high risk of nasal addiction.  Pseudoephedrine or phenylephrine (Sudafed) is often helpful but it can cause elevations in blood pressure and insomnia.     Sometimes a nasal saline spray will help rinse out the nasal passages and feel good.     Guaifenesin (Robitussin or Mucinex) helps loosen secretions and often help make the mucous more liquid and easier to clear.    For pain and fevers, acetaminophen (Tylenol) is most appropriate.  Ibuprofen (Advil) or naproxen (Aleve) are useful too and last longer but they can cause elevation of blood pressure or stomach problems.    Antihistamines (Benadryl, Dimetapp, etc.) cause sedation, confusion, bowel and urinary abnormalities and are of little use for infectious causes of cough and nasal congestion.  Their use should be reserved for allergic symptoms.    The body needs to be treated well in order to help heal itself.  Rest as needed.  It is ok to reduce food intake if appetite is poor but it is quite important to maintain/increase fluid intake.  Sinus pressure and infections usually go away on their own with appropriate care.

## 2022-10-11 NOTE — PROGRESS NOTES
SUBJECTIVE:   CC: Kendra is an 41 year old who presents for preventive health visit.     Patient has been advised of split billing requirements and indicates understanding: Yes     Healthy Habits:     Getting at least 3 servings of Calcium per day:  Yes    Bi-annual eye exam:  Yes    Dental care twice a year:  Yes    Sleep apnea or symptoms of sleep apnea:  Sleep apnea    Diet:  Gluten-free/reduced    Frequency of exercise:  4-5 days/week    Duration of exercise:  45-60 minutes    Taking medications regularly:  Yes    Medication side effects:  Not applicable    PHQ-2 Total Score: 0    Additional concerns today:  Yes    -She is hoping you can take a peek in her ears. She had covid three weeks ago and she is still having a lot of pressure.        Today's PHQ-2 Score:   PHQ-2 (  [employer]) 10/11/2022   Q1: Little interest or pleasure in doing things 0   Q2: Feeling down, depressed or hopeless 0   PHQ-2 Score 0   PHQ-2 Total Score (12-17 Years)- Positive if 3 or more points; Administer PHQ-A if positive -   Q1: Little interest or pleasure in doing things Not at all   Q2: Feeling down, depressed or hopeless Not at all   PHQ-2 Score 0       Abuse: Current or Past (Physical, Sexual or Emotional) - No  Do you feel safe in your environment? Yes    Have you ever done Advance Care Planning? (For example, a Health Directive, POLST, or a discussion with a medical provider or your loved ones about your wishes): No, advance care planning information given to patient to review.  Patient declined advance care planning discussion at this time.    Social History     Tobacco Use     Smoking status: Former     Packs/day: 0.00     Years: 0.00     Pack years: 0.00     Types: Cigarettes     Quit date: 2016     Years since quittin.1     Smokeless tobacco: Never   Substance Use Topics     Alcohol use: Not Currently     If you drink alcohol do you typically have >3 drinks per day or >7 drinks per week? Not applicable    Alcohol Use  10/11/2022   Prescreen: >3 drinks/day or >7 drinks/week? No   Prescreen: >3 drinks/day or >7 drinks/week? -   No flowsheet data found.    Reviewed orders with patient.  Reviewed health maintenance and updated orders accordingly - Yes  Lab work is in process    Breast Cancer Screening:    FHS-7:   Breast CA Risk Assessment (FHS-7) 10/4/2021 1/27/2022 10/11/2022   Did any of your first-degree relatives have breast or ovarian cancer? No No No   Did any of your relatives have bilateral breast cancer? No No No   Did any man in your family have breast cancer? Yes Yes Yes   Did any woman in your family have breast and ovarian cancer? No No No   Did any woman in your family have breast cancer before age 50 y? No No No   Do you have 2 or more relatives with breast and/or ovarian cancer? No No No   Do you have 2 or more relatives with breast and/or bowel cancer? - No No         Pertinent mammograms are reviewed under the imaging tab.    History of abnormal Pap smear: NO - age 30-65 PAP every 5 years with negative HPV co-testing recommended  PAP / HPV Latest Ref Rng & Units 7/19/2019 8/26/2016 12/8/2010   PAP (Historical) - NIL NIL NIL   HPV16 NEG:Negative Negative Negative -   HPV18 NEG:Negative Negative Negative -   HRHPV NEG:Negative Negative Negative -     Reviewed and updated as needed this visit by clinical staff                  Reviewed and updated as needed this visit by Provider                     Review of Systems   Constitutional: Negative for chills and fever.   HENT: Positive for congestion and ear pain. Negative for hearing loss and sore throat.    Eyes: Negative for pain and visual disturbance.   Respiratory: Positive for cough. Negative for shortness of breath.    Cardiovascular: Positive for chest pain. Negative for palpitations and peripheral edema.   Gastrointestinal: Negative for abdominal pain, constipation, diarrhea, heartburn, hematochezia and nausea.   Genitourinary: Negative for dysuria, frequency,  "genital sores, hematuria and urgency.   Musculoskeletal: Negative for arthralgias, joint swelling and myalgias.   Skin: Negative for rash.   Neurological: Positive for weakness. Negative for dizziness, headaches and paresthesias.   Psychiatric/Behavioral: Negative for mood changes. The patient is not nervous/anxious.           OBJECTIVE:   /82   Pulse 60   Temp 98.3  F (36.8  C) (Tympanic)   Resp 14   Ht 1.683 m (5' 6.25\")   Wt 69.9 kg (154 lb)   SpO2 99%   BMI 24.67 kg/m    Physical Exam  GENERAL: healthy, alert and no distress  HENT: ear canals  normal, nose and mouth without ulcers or lesions  HENT: normal cephalic/atraumatic,  TM's normal neelam colored , nose and mouth without ulcers or lesions, oropharynx clear and oral mucous membranes moist max sinuses tender   NECK: no adenopathy, no asymmetry, masses, or scars and thyroid normal to palpation  RESP: lungs clear to auscultation - no rales, rhonchi or wheezes  BREAST: normal without masses, tenderness or nipple discharge and no palpable axillary masses or adenopathy  CV: regular rate and rhythm, normal S1 S2, no S3 or S4, no murmur, click or rub, no peripheral edema and peripheral pulses strong  MS: no gross musculoskeletal defects noted, no edema  SKIN: no suspicious lesions or rashes        ASSESSMENT/PLAN:   (E03.4) Hypothyroidism due to acquired atrophy of thyroid  (primary encounter diagnosis)  Comment:   Plan: TSH with free T4 reflex, SYNTHROID 112 MCG         tablet        Well controlled check today     (Z00.00) Routine general medical examination at a health care facility  Comment:  Plan:     (F31.74) Bipolar disorder, in full remission, most recent episode manic (H)  Comment:   Plan: Lithium level, Basic metabolic panel  (Ca, Cl,         CO2, Creat, Gluc, K, Na, BUN)        Good control sees psych regularly    (J01.90) Acute sinusitis with symptoms > 10 days  Comment:   Plan: amoxicillin-clavulanate (AUGMENTIN) 875-125 MG         " "tablet        Will treat     (L70.0) Acne vulgaris  Comment:   Plan: norgestimate-ethinyl estradiol (ESTARYLLA)         0.25-35 MG-MCG tablet, spironolactone         (ALDACTONE) 50 MG tablet        Cont     (R45.4) Irritability  Comment:   Plan: norgestimate-ethinyl estradiol (ESTARYLLA)         0.25-35 MG-MCG tablet        Does well with period suppression     (G43.839) Menstrual migraine, intractable, without status migrainosus  Comment:   Plan: norgestimate-ethinyl estradiol (ESTARYLLA)         0.25-35 MG-MCG tablet                  COUNSELING:  Reviewed preventive health counseling, as reflected in patient instructions    Estimated body mass index is 23.23 kg/m  as calculated from the following:    Height as of 2/7/22: 1.683 m (5' 6.25\").    Weight as of 2/7/22: 65.8 kg (145 lb).        She reports that she quit smoking about 6 years ago. Her smoking use included cigarettes. She has never used smokeless tobacco.      Counseling Resources:  ATP IV Guidelines  Pooled Cohorts Equation Calculator  Breast Cancer Risk Calculator  BRCA-Related Cancer Risk Assessment: FHS-7 Tool  FRAX Risk Assessment  ICSI Preventive Guidelines  Dietary Guidelines for Americans, 2010  USDA's MyPlate  ASA Prophylaxis  Lung CA Screening    Pia Mar MD  [facility]"

## 2022-11-04 ENCOUNTER — HOSPITAL ENCOUNTER (OUTPATIENT)
Dept: MAMMOGRAPHY | Facility: CLINIC | Age: 42
Discharge: HOME | End: 2022-11-04
Attending: FAMILY MEDICINE | Admitting: FAMILY MEDICINE
Payer: COMMERCIAL

## 2022-11-04 DIAGNOSIS — Z12.31 VISIT FOR SCREENING MAMMOGRAM: ICD-10-CM

## 2022-11-04 PROCEDURE — 77067 SCR MAMMO BI INCL CAD: CPT

## 2023-01-14 ENCOUNTER — HEALTH MAINTENANCE LETTER (OUTPATIENT)
Age: 43
End: 2023-01-14

## 2023-01-26 ENCOUNTER — TELEPHONE (OUTPATIENT)
Dept: FAMILY MEDICINE | Facility: CLINIC | Age: 43
End: 2023-01-26
Payer: COMMERCIAL

## 2023-01-26 NOTE — TELEPHONE ENCOUNTER
Prior Authorization Retail Medication Request    Medication/Dose:   ICD code (if different than what is on RX):  Hypothyroidism due to acquired atrophy of thyroid [E03.4]   Previously Tried and Failed:    Rationale:      Insurance Name:  [employer]  Insurance ID:  [id number]      Pharmacy Information (if different than what is on RX)  Name:  [facility]  Phone:  [phone]

## 2023-01-26 NOTE — LETTER
[facility]  [address] [facility] [address]  [phone]        February 2, 2023    Regarding:  Kesha Carter  [address] [place] [phone]              To Whom It May Concern;  The above-named patient requires brand-name Synthroid.  In the past when she has been given generics it has caused her tachycardia and excessive sweating.  Please dispense as written.          Sincerely,        Pia Mar MD

## 2023-01-31 NOTE — TELEPHONE ENCOUNTER
PRIOR AUTHORIZATION DENIED    Medication: Synthroid (ZABRINA)-DENIED    Denial Date: 1/31/2023    Denial Rational:       Appeal Information:

## 2023-01-31 NOTE — TELEPHONE ENCOUNTER
Central Prior Authorization Team   Phone: [phone]      PA Initiation    Medication: Synthroid (ZABRINA)  Insurance Company: [employer] - Phone [phone] Fax [phone]  Pharmacy Filling the Rx: [facility] - [place], [place] - [address] [place]  Filling Pharmacy Phone: [phone]  Filling Pharmacy Fax:    Start Date: 1/31/2023

## 2023-02-03 NOTE — TELEPHONE ENCOUNTER
Medication Appeal Initiation    We have initiated an appeal for the requested medication:  Medication: Synthroid (ZABRINA)-APPEAL INITIATED  Appeal Start Date:  2/3/2023  Insurance Company: [employer] - Phone [phone] Fax [phone]  Comments:       Completed appeal on CMM

## 2023-02-03 NOTE — TELEPHONE ENCOUNTER
MEDICATION APPEAL APPROVED    Medication: Synthroid (ZABRINA)-APPEAL APPROVED  Authorization Effective Date: 1/20/2023  Authorization Expiration Date: 2/3/2024  Approved Dose/Quantity:   Reference #:     Insurance Company: [employer] - Phone [phone] Fax [phone]  Expected CoPay:       CoPay Card Available:      Foundation Assistance Needed:    Which Pharmacy is filling the prescription (Not needed for infusion/clinic administered): [facility] - [facility] [address]    **Instructed pharmacy to notify patient when script is ready to /ship.**

## 2023-03-23 DIAGNOSIS — G43.839 MENSTRUAL MIGRAINE, INTRACTABLE, WITHOUT STATUS MIGRAINOSUS: ICD-10-CM

## 2023-03-23 DIAGNOSIS — M54.59 MECHANICAL LOW BACK PAIN: ICD-10-CM

## 2023-03-23 DIAGNOSIS — L70.0 ACNE VULGARIS: ICD-10-CM

## 2023-03-23 DIAGNOSIS — R45.4 IRRITABILITY: ICD-10-CM

## 2023-03-24 RX ORDER — NORGESTIMATE AND ETHINYL ESTRADIOL 0.25-0.035
KIT ORAL
Qty: 112 TABLET | Refills: 4 | OUTPATIENT
Start: 2023-03-24

## 2023-03-27 RX ORDER — CYCLOBENZAPRINE HCL 5 MG
TABLET ORAL
Qty: 60 TABLET | Refills: 3 | Status: SHIPPED | OUTPATIENT
Start: 2023-03-27 | End: 2023-10-23

## 2023-04-06 ENCOUNTER — VIRTUAL VISIT (OUTPATIENT)
Dept: FAMILY MEDICINE | Facility: CLINIC | Age: 43
End: 2023-04-06
Payer: COMMERCIAL

## 2023-04-06 DIAGNOSIS — E03.2 HYPOTHYROIDISM DUE TO MEDICATION: ICD-10-CM

## 2023-04-06 DIAGNOSIS — R11.0 NAUSEA: ICD-10-CM

## 2023-04-06 DIAGNOSIS — G43.019 INTRACTABLE MIGRAINE WITHOUT AURA AND WITHOUT STATUS MIGRAINOSUS: ICD-10-CM

## 2023-04-06 DIAGNOSIS — F31.74 BIPOLAR DISORDER, IN FULL REMISSION, MOST RECENT EPISODE MANIC (H): ICD-10-CM

## 2023-04-06 DIAGNOSIS — R51.9 CHRONIC DAILY HEADACHE: Primary | ICD-10-CM

## 2023-04-06 PROCEDURE — 99214 OFFICE O/P EST MOD 30 MIN: CPT | Mod: VID | Performed by: FAMILY MEDICINE

## 2023-04-06 RX ORDER — INDOMETHACIN 25 MG/1
25 CAPSULE ORAL 2 TIMES DAILY WITH MEALS
Qty: 40 CAPSULE | Refills: 1 | Status: SHIPPED | OUTPATIENT
Start: 2023-04-06 | End: 2024-05-13 | Stop reason: SINTOL

## 2023-04-06 RX ORDER — ONDANSETRON 8 MG/1
8 TABLET, ORALLY DISINTEGRATING ORAL EVERY 8 HOURS PRN
Qty: 30 TABLET | Refills: 0 | Status: SHIPPED | OUTPATIENT
Start: 2023-04-06 | End: 2023-05-25

## 2023-04-06 RX ORDER — TOPIRAMATE 25 MG/1
TABLET, FILM COATED ORAL
Qty: 75 TABLET | Refills: 3 | Status: SHIPPED | OUTPATIENT
Start: 2023-04-06 | End: 2023-10-16

## 2023-04-12 ENCOUNTER — LAB (OUTPATIENT)
Dept: LAB | Facility: CLINIC | Age: 43
End: 2023-04-12
Payer: COMMERCIAL

## 2023-04-12 DIAGNOSIS — F31.74 BIPOLAR DISORDER, IN FULL REMISSION, MOST RECENT EPISODE MANIC (H): ICD-10-CM

## 2023-04-12 DIAGNOSIS — E03.2 HYPOTHYROIDISM DUE TO MEDICATION: ICD-10-CM

## 2023-04-12 DIAGNOSIS — Z11.59 NEED FOR HEPATITIS C SCREENING TEST: ICD-10-CM

## 2023-04-12 LAB
HCV AB SERPL QL IA: NONREACTIVE
TSH SERPL DL<=0.005 MIU/L-ACNC: 3.1 MU/L (ref 0.4–4)

## 2023-04-12 PROCEDURE — 36415 COLL VENOUS BLD VENIPUNCTURE: CPT

## 2023-04-12 PROCEDURE — 80178 ASSAY OF LITHIUM: CPT

## 2023-04-12 PROCEDURE — 84443 ASSAY THYROID STIM HORMONE: CPT

## 2023-04-12 PROCEDURE — 86803 HEPATITIS C AB TEST: CPT

## 2023-04-13 LAB — LITHIUM SERPL-SCNC: 0.7 MMOL/L (ref 0.6–1.2)

## 2023-05-25 ENCOUNTER — TELEPHONE (OUTPATIENT)
Dept: ONCOLOGY | Facility: CLINIC | Age: 43
End: 2023-05-25

## 2023-05-25 ENCOUNTER — VIRTUAL VISIT (OUTPATIENT)
Dept: FAMILY MEDICINE | Facility: CLINIC | Age: 43
End: 2023-05-25
Payer: COMMERCIAL

## 2023-05-25 DIAGNOSIS — G43.019 INTRACTABLE MIGRAINE WITHOUT AURA AND WITHOUT STATUS MIGRAINOSUS: ICD-10-CM

## 2023-05-25 DIAGNOSIS — Z87.898 HX OF MOTION SICKNESS: Primary | ICD-10-CM

## 2023-05-25 DIAGNOSIS — R11.0 NAUSEA: ICD-10-CM

## 2023-05-25 PROCEDURE — 99213 OFFICE O/P EST LOW 20 MIN: CPT | Mod: VID | Performed by: FAMILY MEDICINE

## 2023-05-25 RX ORDER — ONDANSETRON 8 MG/1
8 TABLET, ORALLY DISINTEGRATING ORAL 2 TIMES DAILY
Qty: 180 TABLET | Refills: 4 | Status: SHIPPED | OUTPATIENT
Start: 2023-05-25

## 2023-05-25 RX ORDER — TOPIRAMATE 100 MG/1
100 TABLET, FILM COATED ORAL DAILY
Qty: 90 TABLET | Refills: 3 | Status: SHIPPED | OUTPATIENT
Start: 2023-05-25 | End: 2024-05-22

## 2023-05-25 NOTE — PROGRESS NOTES
Kendra is a 42 year old who is being evaluated via a billable video visit.      How would you like to obtain your AVS? MyChart  If the video visit is dropped, the invitation should be resent by: Text to cell phone: [phone]  Will anyone else be joining your video visit? No    Pia Mar M.D.        Assessment & Plan     Nausea    - topiramate (TOPAMAX) 100 MG tablet; Take 1 tablet (100 mg) by mouth daily  - ondansetron (ZOFRAN ODT) 8 MG ODT tab; Take 1 tablet (8 mg) by mouth 2 times daily    Hx of motion sickness    - topiramate (TOPAMAX) 100 MG tablet; Take 1 tablet (100 mg) by mouth daily    Intractable migraine without aura and without status migrainosus  topamax  We will have her increase to 100 and milligrams of Topamax.  She is not sure whether it is helping her or not but in general it is more likely that it is helping her.  I also just had her consider taking some Zofran.  Her nausea is one of the biggest parts of this.  There were no red flags or anything else that be concerned about for her.           3 months sooner if not improving     Pia Mar MD  [facility]   Kendra is a 42 year old, presenting for the following health issues:  Headache        5/25/2023     8:15 AM   Additional Questions   Roomed by RADHA Meza   Accompanied by self         5/25/2023     8:15 AM   Patient Reported Additional Medications   Patient reports taking the following new medications none     History of Present Illness       Reason for visit:  Migraines    She eats 4 or more servings of fruits and vegetables daily.She consumes 0 sweetened beverage(s) daily.She exercises with enough effort to increase her heart rate 60 or more minutes per day.  She exercises with enough effort to increase her heart rate 7 days per week.   She is taking medications regularly.       Migraine   Would like to discuss if headaches could possibly be hormonal related or if she should start seeing an  neurologist.     Since your last clinic visit, how have your headaches changed?  Improved but still having     How often are you getting headaches or migraines? 4x/ week.  Primarily in evening, does not seem to differ with limiting screen time on computer.     Are you able to do normal daily activities when you have a migraine? Yes    Are you taking rescue/relief medications? (Select all that apply) No    How helpful is your rescue/relief medication?  na    Are you taking any medications to prevent migraines? (Select all that apply)  Topamax & (stopped) indomethacin after 2-3 week trial.  States indomethacin caused a lot of nausea, upset stomach, and diarrhea     In the past 4 weeks, how often have you gone to urgent care or the emergency room because of your headaches?  0      Still in the afternoon her mental health is excellent and she is working in a blended environment.   She has headache without aura and she does have nausea at times with this . She is toerating the topamax       Review of Systems   Constitutional, HEENT, cardiovascular, pulmonary, gi and gu systems are negative, except as otherwise noted.      Objective           Vitals:  No vitals were obtained today due to virtual visit.    Physical Exam   GENERAL: Healthy, alert and no distress  EYES: Eyes grossly normal to inspection.  No discharge or erythema, or obvious scleral/conjunctival abnormalities.  RESP: No audible wheeze, cough, or visible cyanosis.  No visible retractions or increased work of breathing.    SKIN: Visible skin clear. No significant rash, abnormal pigmentation or lesions.  NEURO: Cranial nerves grossly intact.  Mentation and speech appropriate for age.  PSYCH: Mentation appears normal, affect normal/bright, judgement and insight intact, normal speech and appearance well-groomed.    Lab on 04/12/2023   Component Date Value Ref Range Status     Hepatitis C Antibody 04/12/2023 Nonreactive  Nonreactive Final     TSH 04/12/2023 3.10   0.40 - 4.00 mU/L Final     Lithium 04/12/2023 0.7  0.6 - 1.2 mmol/L Final    Therapeutic: 0.60 - 1.20 mmol/L;   Toxic: >2.00 mmol/L               Video-Visit Details    Type of service:  Video Visit     Originating Location (pt. Location): Home    Distant Location (provider location):  On-site  Platform used for Video Visit: Katlyn

## 2023-05-25 NOTE — TELEPHONE ENCOUNTER
PA Initiation    Medication: ONDANSETRON 8 MG PO TBDP  Insurance Company: [employer] - Phone [phone] Fax [phone]  Pharmacy Filling the Rx: [facility] [place], [place] - [address] [place] [address] [phone]  Filling Pharmacy Phone:    Filling Pharmacy Fax:    Start Date: 5/25/2023        Charo Schmidt CPhT  [facility]  Oncology Pharmacy Liaison II  [email or web address]  [phone] (phone  [phone] (fax

## 2023-06-01 NOTE — TELEPHONE ENCOUNTER
Prior Authorization Approval    Medication: ONDANSETRON 8 MG PO TBDP  Authorization Effective Date: 4/25/2023  Authorization Expiration Date: 11/21/2023  Approved Dose/Quantity: 30/10  Reference #: [id number]   Insurance Company: [employer] - Phone [phone] Fax [phone]  Expected CoPay:       CoPay Card Available:      Financial Assistance Needed:    Which Pharmacy is filling the prescription: [facility] [address]  Pharmacy Notified:    Patient Notified:          Charo Schmidt CPhT  [facility]  Oncology Pharmacy Liaison II  [email or web address]  [phone] (phone  [phone] (fax

## 2023-06-12 DIAGNOSIS — Z87.898 HX OF MOTION SICKNESS: ICD-10-CM

## 2023-06-13 RX ORDER — SCOLOPAMINE TRANSDERMAL SYSTEM 1 MG/1
1 PATCH, EXTENDED RELEASE TRANSDERMAL
Qty: 4 PATCH | Refills: 1 | Status: SHIPPED | OUTPATIENT
Start: 2023-06-13

## 2023-10-05 ENCOUNTER — PATIENT OUTREACH (OUTPATIENT)
Dept: CARE COORDINATION | Facility: CLINIC | Age: 43
End: 2023-10-05
Payer: COMMERCIAL

## 2023-10-16 ENCOUNTER — OFFICE VISIT (OUTPATIENT)
Dept: FAMILY MEDICINE | Facility: CLINIC | Age: 43
End: 2023-10-16
Payer: COMMERCIAL

## 2023-10-16 VITALS
HEIGHT: 66 IN | WEIGHT: 131 LBS | BODY MASS INDEX: 21.05 KG/M2 | HEART RATE: 60 BPM | OXYGEN SATURATION: 100 % | SYSTOLIC BLOOD PRESSURE: 120 MMHG | TEMPERATURE: 98.1 F | DIASTOLIC BLOOD PRESSURE: 78 MMHG

## 2023-10-16 DIAGNOSIS — Z12.31 VISIT FOR SCREENING MAMMOGRAM: ICD-10-CM

## 2023-10-16 DIAGNOSIS — F31.74 BIPOLAR DISORDER, IN FULL REMISSION, MOST RECENT EPISODE MANIC (H): ICD-10-CM

## 2023-10-16 DIAGNOSIS — Z00.00 ROUTINE GENERAL MEDICAL EXAMINATION AT A HEALTH CARE FACILITY: Primary | ICD-10-CM

## 2023-10-16 DIAGNOSIS — G43.839 MENSTRUAL MIGRAINE, INTRACTABLE, WITHOUT STATUS MIGRAINOSUS: ICD-10-CM

## 2023-10-16 DIAGNOSIS — E03.4 HYPOTHYROIDISM DUE TO ACQUIRED ATROPHY OF THYROID: ICD-10-CM

## 2023-10-16 DIAGNOSIS — Z13.220 SCREENING FOR CHOLESTEROL LEVEL: ICD-10-CM

## 2023-10-16 LAB
ANION GAP SERPL CALCULATED.3IONS-SCNC: 15 MMOL/L (ref 7–15)
BUN SERPL-MCNC: 25.2 MG/DL (ref 6–20)
CALCIUM SERPL-MCNC: 10 MG/DL (ref 8.6–10)
CHLORIDE SERPL-SCNC: 107 MMOL/L (ref 98–107)
CHOLEST SERPL-MCNC: 177 MG/DL
CREAT SERPL-MCNC: 1.17 MG/DL (ref 0.51–0.95)
DEPRECATED HCO3 PLAS-SCNC: 19 MMOL/L (ref 22–29)
EGFRCR SERPLBLD CKD-EPI 2021: 59 ML/MIN/1.73M2
GLUCOSE SERPL-MCNC: 80 MG/DL (ref 70–99)
HDLC SERPL-MCNC: 89 MG/DL
LDLC SERPL CALC-MCNC: 68 MG/DL
LITHIUM SERPL-SCNC: 0.58 MMOL/L (ref 0.6–1.2)
NONHDLC SERPL-MCNC: 88 MG/DL
POTASSIUM SERPL-SCNC: 4.3 MMOL/L (ref 3.4–5.3)
SODIUM SERPL-SCNC: 141 MMOL/L (ref 135–145)
TRIGL SERPL-MCNC: 100 MG/DL
TSH SERPL DL<=0.005 MIU/L-ACNC: 4.11 UIU/ML (ref 0.3–4.2)

## 2023-10-16 PROCEDURE — 80048 BASIC METABOLIC PNL TOTAL CA: CPT | Performed by: FAMILY MEDICINE

## 2023-10-16 PROCEDURE — 99214 OFFICE O/P EST MOD 30 MIN: CPT | Mod: 25 | Performed by: FAMILY MEDICINE

## 2023-10-16 PROCEDURE — 99396 PREV VISIT EST AGE 40-64: CPT | Mod: 25 | Performed by: FAMILY MEDICINE

## 2023-10-16 PROCEDURE — 80061 LIPID PANEL: CPT | Performed by: FAMILY MEDICINE

## 2023-10-16 PROCEDURE — 90471 IMMUNIZATION ADMIN: CPT | Performed by: FAMILY MEDICINE

## 2023-10-16 PROCEDURE — 80178 ASSAY OF LITHIUM: CPT | Performed by: FAMILY MEDICINE

## 2023-10-16 PROCEDURE — 36415 COLL VENOUS BLD VENIPUNCTURE: CPT | Performed by: FAMILY MEDICINE

## 2023-10-16 PROCEDURE — 90686 IIV4 VACC NO PRSV 0.5 ML IM: CPT | Performed by: FAMILY MEDICINE

## 2023-10-16 PROCEDURE — 84443 ASSAY THYROID STIM HORMONE: CPT | Performed by: FAMILY MEDICINE

## 2023-10-16 RX ORDER — ZOLMITRIPTAN 5 MG/1
TABLET, FILM COATED ORAL
Qty: 12 TABLET | Refills: 3 | Status: SHIPPED | OUTPATIENT
Start: 2023-10-16

## 2023-10-16 RX ORDER — CETIRIZINE HYDROCHLORIDE 10 MG/1
10 TABLET ORAL DAILY
COMMUNITY
Start: 2023-10-16

## 2023-10-16 ASSESSMENT — ENCOUNTER SYMPTOMS
CHILLS: 0
COUGH: 0
HEADACHES: 1
HEMATOCHEZIA: 0
FEVER: 0
PALPITATIONS: 0
NERVOUS/ANXIOUS: 0
DIARRHEA: 0
SORE THROAT: 0
CONSTIPATION: 0
DIZZINESS: 0
HEARTBURN: 1
ABDOMINAL PAIN: 0
EYE PAIN: 0
HEMATURIA: 0
MYALGIAS: 0
JOINT SWELLING: 0
WEAKNESS: 0
SHORTNESS OF BREATH: 0
NAUSEA: 1
ARTHRALGIAS: 0
PARESTHESIAS: 0
DYSURIA: 0
FREQUENCY: 0

## 2023-10-16 NOTE — PATIENT INSTRUCTIONS
Preventive Health Recommendations  Female Ages 40 to 49    Yearly exam:   See your health care provider every year in order to  Review health changes.   Discuss preventive care.    Review your medicines if your doctor prescribed any.    Get a Pap test every three years (unless you have an abnormal result and your provider advises testing more often).    If you get Pap tests with HPV test, you only need to test every 5 years, unless you have an abnormal result. You do not need a Pap test if your uterus was removed (hysterectomy) and you have not had cancer.    You should be tested each year for STDs (sexually transmitted diseases), if you're at risk.   Ask your doctor if you should have a mammogram.    Have a colonoscopy (test for colon cancer) beginning at age 45.  Ask your provider about other options like a yearly FIT test or Cologuard test every 3 years (stool tests)      Have a cholesterol test every 5 years.     Have a diabetes test (fasting glucose) after age 45. If you are at risk for diabetes, you should have this test every 3 years.    Shots: Get a flu shot each year. Get a tetanus shot every 10 years.     Nutrition:   Eat at least 5 servings of fruits and vegetables each day.  Eat whole-grain bread, whole-wheat pasta and brown rice instead of white grains and rice.  Get adequate Calcium and Vitamin D.      Lifestyle  Exercise at least 150 minutes a week (an average of 30 minutes a day, 5 days a week). This will help you control your weight and prevent disease.  Limit alcohol to one drink per day.  No smoking.   Wear sunscreen to prevent skin cancer.  See your dentist every six months for an exam and cleaning.  
Joaquin Willams)  Orthopaedic Surgery  [address] [facility], [address]  [place] [address]  Phone: [phone]  Fax: [phone]  Follow Up Time:     Adalberto Mack)  Geriatric Medicine; Internal Medicine  [address], [place] [id number]  Phone: [phone]  Fax: [phone]  Follow Up Time:

## 2023-10-16 NOTE — PROGRESS NOTES
SUBJECTIVE:   CC: Kendra is an 42 year old who presents for preventive health visit.       10/16/2023     8:22 AM   Additional Questions   Roomed by Paulette COFFMAN CMA       Healthy Habits:     Getting at least 3 servings of Calcium per day:  Yes    Bi-annual eye exam:  Yes    Dental care twice a year:  Yes    Sleep apnea or symptoms of sleep apnea:  None    Diet:  Low fat/cholesterol and Gluten-free/reduced    Frequency of exercise:  6-7 days/week    Duration of exercise:  45-60 minutes    Taking medications regularly:  Yes    Medication side effects:  None    Additional concerns today:  No  Sees Dr. Lange for mental health     *Headaches are back, believes this is from stress.   She has been having more headache recently she had been having few headache then she has had increased work stress and she has the headache daily and she has not been taking the zomig  She does not eat in the morning does run in th eam and she drinks water but does not eat until later so she does   At the end of the day they are the worst and they have been bothering her for 3 weeks   The bck has been better with the flexeril       The headache are the newest issue. She had been having them rarely the indocin does work but she does not eat breakfast so she doesn't take until later in the day. Has not been taking zomig because she didn't know that she could take it with the topamax        Social History     Tobacco Use    Smoking status: Former     Packs/day: 0.00     Years: 0.00     Additional pack years: 0.00     Total pack years: 0.00     Types: Cigarettes     Quit date: 2016     Years since quittin.1    Smokeless tobacco: Never   Substance Use Topics    Alcohol use: Not Currently         10/16/2023     8:16 AM   Alcohol Use   Prescreen: >3 drinks/day or >7 drinks/week? No          No data to display              Reviewed orders with patient.  Reviewed health maintenance and updated orders accordingly - Yes  Lab work is in  process    Breast Cancer Screening:    FHS-7:       10/4/2021     3:00 PM 1/27/2022     8:33 AM 10/11/2022     8:08 AM   Breast CA Risk Assessment (FHS-7)   Did any of your first-degree relatives have breast or ovarian cancer? No No No   Did any of your relatives have bilateral breast cancer? No No No   Did any man in your family have breast cancer? Yes Yes Yes   Did any woman in your family have breast and ovarian cancer? No No No   Did any woman in your family have breast cancer before age 50 y? No No No   Do you have 2 or more relatives with breast and/or ovarian cancer? No No No   Do you have 2 or more relatives with breast and/or bowel cancer?  No No       Mammogram Screening - Offered annual screening and updated Health Maintenance for mutual plan based on risk factor consideration  Pertinent mammograms are reviewed under the imaging tab.    History of abnormal Pap smear: NO - age 30-65 PAP every 5 years with negative HPV co-testing recommended      Latest Ref Rng & Units 7/19/2019     4:06 PM 7/19/2019     3:44 PM 8/26/2016     8:48 AM   PAP / HPV   PAP (Historical)  NIL      HPV 16 DNA NEG^Negative  Negative  Negative    HPV 18 DNA NEG^Negative  Negative  Negative    Other HR HPV NEG^Negative  Negative  Negative      Reviewed and updated as needed this visit by clinical staff   Tobacco  Allergies  Meds  Problems  Med Hx  Surg Hx  Fam Hx          Reviewed and updated as needed this visit by Provider                 Past Medical History:   Diagnosis Date    Thyroid disease 2013        Review of Systems   Constitutional:  Negative for chills and fever.   HENT:  Negative for congestion, ear pain, hearing loss and sore throat.    Eyes:  Negative for pain and visual disturbance.   Respiratory:  Negative for cough and shortness of breath.    Cardiovascular:  Negative for chest pain, palpitations and peripheral edema.   Gastrointestinal:  Positive for heartburn and nausea. Negative for abdominal pain,  "constipation, diarrhea and hematochezia.   Breasts:  Negative for tenderness and discharge.   Genitourinary:  Negative for dysuria, frequency, genital sores, hematuria, pelvic pain, urgency, vaginal bleeding and vaginal discharge.   Musculoskeletal:  Negative for arthralgias, joint swelling and myalgias.   Skin:  Negative for rash.   Neurological:  Positive for headaches. Negative for dizziness, weakness and paresthesias.   Psychiatric/Behavioral:  Negative for mood changes. The patient is not nervous/anxious.           OBJECTIVE:   /78 (BP Location: Right arm, Patient Position: Sitting, Cuff Size: Adult Regular)   Pulse 60   Temp 98.1  F (36.7  C) (Tympanic)   Ht 1.67 m (5' 5.75\")   Wt 59.4 kg (131 lb)   SpO2 100%   BMI 21.31 kg/m    Physical Exam  GENERAL: healthy, alert and no distress  EYES: Eyes grossly normal to inspection, PERRL and conjunctivae and sclerae normal  HENT: ear canals and TM's normal, nose and mouth without ulcers or lesions  NECK: no adenopathy, no asymmetry, masses, or scars and thyroid normal to palpation  RESP: lungs clear to auscultation - no rales, rhonchi or wheezes  BREAST: normal without masses, tenderness or nipple discharge and no palpable axillary masses or adenopathy  BREAST: normal without masses, tenderness or nipple discharge, no palpable axillary masses or adenopathy, and implant bilateral   CV: regular rate and rhythm, normal S1 S2, no S3 or S4, no murmur, click or rub, no peripheral edema and peripheral pulses strong  ABDOMEN: soft, nontender, no hepatosplenomegaly, no masses and bowel sounds normal  MS: no gross musculoskeletal defects noted, no edema  SKIN: no suspicious lesions or rashes  NEURO: Normal strength and tone, mentation intact and speech normal  PSYCH: mentation appears normal, affect normal/bright        ASSESSMENT/PLAN:   (Z00.00) Routine general medical examination at a health care facility  (primary encounter diagnosis)  Comment:   Plan: "     (Z12.31) Visit for screening mammogram  Comment:   Plan: MA SCREENING DIGITAL BILAT - Future  (s+30)            (F31.74) Bipolar disorder, in full remission, most recent episode manic (H24)  Comment:   Plan: Lithium level, Basic metabolic panel  (Ca, Cl,         CO2, Creat, Gluc, K, Na, BUN)        Doing well needs labs sees psych quarterly     (G43.839) Menstrual migraine, intractable, without status migrainosus  Comment:   Plan: ZOLMitriptan (ZOMIG) 5 MG tablet, Basic         metabolic panel  (Ca, Cl, CO2, Creat, Gluc, K,         Na, BUN)            (Z13.220) Screening for cholesterol level  Comment:   Plan: Lipid panel reflex to direct LDL Fasting            (E03.4) Hypothyroidism due to acquired atrophy of thyroid  Comment:   Plan: TSH with free T4 reflex        Check today           COUNSELING:  Reviewed preventive health counseling, as reflected in patient instructions        She reports that she quit smoking about 7 years ago. Her smoking use included cigarettes. She has never used smokeless tobacco.          Pia Mar MD  [facility]

## 2023-10-20 DIAGNOSIS — M54.59 MECHANICAL LOW BACK PAIN: ICD-10-CM

## 2023-10-20 DIAGNOSIS — E03.4 HYPOTHYROIDISM DUE TO ACQUIRED ATROPHY OF THYROID: ICD-10-CM

## 2023-10-23 ENCOUNTER — TELEPHONE (OUTPATIENT)
Dept: FAMILY MEDICINE | Facility: CLINIC | Age: 43
End: 2023-10-23
Payer: COMMERCIAL

## 2023-10-23 DIAGNOSIS — F31.74 BIPOLAR DISORDER, IN FULL REMISSION, MOST RECENT EPISODE MANIC (H): Primary | ICD-10-CM

## 2023-10-23 RX ORDER — CYCLOBENZAPRINE HCL 5 MG
TABLET ORAL
Qty: 60 TABLET | Refills: 3 | Status: SHIPPED | OUTPATIENT
Start: 2023-10-23 | End: 2024-08-07

## 2023-10-23 RX ORDER — LEVOTHYROXINE SODIUM 112 UG/1
TABLET ORAL
Qty: 90 TABLET | Refills: 4 | Status: SHIPPED | OUTPATIENT
Start: 2023-10-23

## 2023-10-23 NOTE — TELEPHONE ENCOUNTER
Dr. Mar recommended repeat BMP in 4 weeks based on last Creatinine result   BMP re-ordered and patient will schedule follow-up lab in 4 weeks    Hans Lee RN

## 2023-11-22 ENCOUNTER — HOSPITAL ENCOUNTER (OUTPATIENT)
Dept: MAMMOGRAPHY | Facility: CLINIC | Age: 43
Discharge: HOME | End: 2023-11-22
Attending: FAMILY MEDICINE | Admitting: FAMILY MEDICINE
Payer: COMMERCIAL

## 2023-11-22 DIAGNOSIS — Z12.31 VISIT FOR SCREENING MAMMOGRAM: ICD-10-CM

## 2023-11-22 PROCEDURE — 77067 SCR MAMMO BI INCL CAD: CPT

## 2023-11-24 DIAGNOSIS — L70.0 ACNE VULGARIS: ICD-10-CM

## 2023-11-24 NOTE — TELEPHONE ENCOUNTER
"Requested Prescriptions   Pending Prescriptions Disp Refills    spironolactone (ALDACTONE) 50 MG tablet [Pharmacy Med Name: Spironolactone 50 MG Oral Tablet (ALDACTONE)] 90 tablet 4     Sig: Take 1 tablet (50 mg) by mouth daily       Diuretics (Including Combos) Protocol Failed - 11/24/2023  1:19 PM        Failed - Normal serum creatinine on file in past 12 months     Recent Labs   Lab Test 10/16/23  0922   CR 1.17*              Passed - Blood pressure under 140/90 in past 12 months     BP Readings from Last 3 Encounters:   10/16/23 120/78   10/11/22 118/82   02/07/22 115/80                 Passed - Recent (12 mo) or future (30 days) visit within the authorizing provider's specialty     Patient has had an office visit with the authorizing provider or a provider within the authorizing providers department within the previous 12 mos or has a future within next 30 days. See \"Patient Info\" tab in inbasket, or \"Choose Columns\" in Meds & Orders section of the refill encounter.              Passed - Medication is active on med list        Passed - Patient is age 18 or older        Passed - No active pregancy on record        Passed - Normal serum potassium on file in past 12 months     Recent Labs   Lab Test 10/16/23  0922   POTASSIUM 4.3                    Passed - Normal serum sodium on file in past 12 months     Recent Labs   Lab Test 10/16/23  0922                 Passed - No positive pregnancy test in past 12 months             "

## 2023-11-26 RX ORDER — SPIRONOLACTONE 50 MG/1
50 TABLET, FILM COATED ORAL DAILY
Qty: 90 TABLET | Refills: 4 | Status: SHIPPED | OUTPATIENT
Start: 2023-11-26

## 2023-11-29 ENCOUNTER — E-VISIT (OUTPATIENT)
Dept: URGENT CARE | Facility: CLINIC | Age: 43
End: 2023-11-29
Payer: COMMERCIAL

## 2023-11-29 ENCOUNTER — LAB (OUTPATIENT)
Dept: LAB | Facility: CLINIC | Age: 43
End: 2023-11-29
Attending: EMERGENCY MEDICINE
Payer: COMMERCIAL

## 2023-11-29 DIAGNOSIS — J02.9 SORE THROAT: ICD-10-CM

## 2023-11-29 DIAGNOSIS — J02.9 SORE THROAT: Primary | ICD-10-CM

## 2023-11-29 DIAGNOSIS — J06.9 VIRAL URI WITH COUGH: ICD-10-CM

## 2023-11-29 LAB
DEPRECATED S PYO AG THROAT QL EIA: NEGATIVE
FLUAV AG SPEC QL IA: NEGATIVE
FLUBV AG SPEC QL IA: NEGATIVE
GROUP A STREP BY PCR: NOT DETECTED

## 2023-11-29 PROCEDURE — 87651 STREP A DNA AMP PROBE: CPT

## 2023-11-29 PROCEDURE — 99421 OL DIG E/M SVC 5-10 MIN: CPT | Performed by: EMERGENCY MEDICINE

## 2023-11-29 PROCEDURE — 87804 INFLUENZA ASSAY W/OPTIC: CPT | Performed by: EMERGENCY MEDICINE

## 2023-12-06 ENCOUNTER — TELEPHONE (OUTPATIENT)
Dept: FAMILY MEDICINE | Facility: CLINIC | Age: 43
End: 2023-12-06

## 2023-12-06 ENCOUNTER — E-VISIT (OUTPATIENT)
Dept: FAMILY MEDICINE | Facility: CLINIC | Age: 43
End: 2023-12-06
Payer: COMMERCIAL

## 2023-12-06 DIAGNOSIS — J02.9 PHARYNGITIS, UNSPECIFIED ETIOLOGY: Primary | ICD-10-CM

## 2023-12-06 PROCEDURE — 99421 OL DIG E/M SVC 5-10 MIN: CPT | Performed by: FAMILY MEDICINE

## 2023-12-06 RX ORDER — DEXAMETHASONE 4 MG/1
8 TABLET ORAL
Qty: 4 TABLET | Refills: 0 | Status: SHIPPED | OUTPATIENT
Start: 2023-12-06 | End: 2023-12-19

## 2023-12-06 NOTE — TELEPHONE ENCOUNTER
Symptoms    Describe your symptoms: sinus infection--     Illness started 11/22 feeling better by 11/28-  Yesterday- chills sore throat, headache, nasal congestion and drainage.      Any pain: Yes: see above    How long have you been having symptoms: 1 day      Have you been seen for this:  tried to do an evisit    Appointment offered?: No--requesting an appt this week    Triage offered?: No    Home remedies tried: netti pot    Preferred Pharmacy:   [facility] - [facility] [address]  [address] [address]  Phone: [phone] Fax: [phone]    [facility] [address] [place] [facility] - [place], [place] - [address]  [address]  [facility] [address]  Phone: [phone] Fax: [phone]      Could we send this information to you [facility] or would you prefer to receive a phone call?:   Patient would prefer a phone call   Okay to leave a detailed message?: Yes at Cell number on file:    Telephone Information:   Mobile [phone]

## 2023-12-07 ENCOUNTER — E-VISIT (OUTPATIENT)
Dept: URGENT CARE | Facility: CLINIC | Age: 43
End: 2023-12-07
Payer: COMMERCIAL

## 2023-12-07 DIAGNOSIS — H10.30 ACUTE BACTERIAL CONJUNCTIVITIS, UNSPECIFIED LATERALITY: Primary | ICD-10-CM

## 2023-12-07 PROCEDURE — 99421 OL DIG E/M SVC 5-10 MIN: CPT | Performed by: PHYSICIAN ASSISTANT

## 2023-12-07 RX ORDER — CIPROFLOXACIN HYDROCHLORIDE 3.5 MG/ML
SOLUTION/ DROPS TOPICAL
Qty: 5 ML | Refills: 0 | Status: SHIPPED | OUTPATIENT
Start: 2023-12-07 | End: 2023-12-14

## 2023-12-07 NOTE — PATIENT INSTRUCTIONS
Thank you for choosing us for your care. I have placed an order for a prescription so that you can start treatment. View your full visit summary for details by clicking on the link below. Your pharmacist will able to address any questions you may have about the medication.     If you re not feeling better within 2-3 days, please schedule an appointment.  You can schedule an appointment right here in [facility], or call [phone]  If the visit is for the same symptoms as your eVisit, we ll refund the cost of your eVisit if seen within seven days.    Pinkeye: Care Instructions  Overview     Pinkeye is redness and swelling of the eye surface and the conjunctiva (the lining of the eyelid and the covering of the white part of the eye). Pinkeye is also called conjunctivitis. Pinkeye is often caused by infection with bacteria or a virus. Dry air, allergies, smoke, and chemicals are other common causes.  Pinkeye often gets better on its own in 7 to 10 days. Antibiotics only help if the pinkeye is caused by bacteria. Pinkeye caused by infection spreads easily. If an allergy or chemical is causing pinkeye, it will not go away unless you can avoid whatever is causing it.  Follow-up care is a key part of your treatment and safety. Be sure to make and go to all appointments, and call your doctor if you are having problems. It's also a good idea to know your test results and keep a list of the medicines you take.  How can you care for yourself at home?  Wash your hands often. Always wash them before and after you treat pinkeye or touch your eyes or face.  Use moist cotton or a clean, wet cloth to remove crust. Wipe from the inside corner of the eye to the outside. Use a clean part of the cloth for each wipe.  Put cold or warm wet cloths on your eye a few times a day if the eye hurts.  Do not wear contact lenses or eye makeup until the pinkeye is gone. Throw away any eye makeup you were using when you got pinkeye. Clean your  "contacts and storage case. If you wear disposable contacts, use a new pair when your eye has cleared and it is safe to wear contacts again.  If the doctor gave you antibiotic ointment or eyedrops, use them as directed. Use the medicine for as long as instructed, even if your eye starts looking better soon. Keep the bottle tip clean, and do not let it touch the eye area.  To put in eyedrops or ointment:  Tilt your head back, and pull your lower eyelid down with one finger.  Drop or squirt the medicine inside the lower lid.  Close your eye for 30 to 60 seconds to let the drops or ointment move around.  Do not touch the ointment or dropper tip to your eyelashes or any other surface.  Do not share towels, pillows, or washcloths while you have pinkeye.  When should you call for help?   Call your doctor now or seek immediate medical care if:    You have pain in your eye, not just irritation on the surface.     You have a change in vision or loss of vision.     You have an increase in discharge from the eye.     Your eye has not started to improve or begins to get worse within 48 hours after you start using antibiotics.     Pinkeye lasts longer than 7 days.   Watch closely for changes in your health, and be sure to contact your doctor if you have any problems.  Where can you learn more?  Go to https://www.[employer].net/patiented  Enter Y392 in the search box to learn more about \"Pinkeye: Care Instructions.\"  Current as of: June 6, 2023               Content Version: 13.8    2894-3915 [employer].   Care instructions adapted under license by your healthcare professional. If you have questions about a medical condition or this instruction, always ask your healthcare professional. [employer] disclaims any warranty or liability for your use of this information.       Taking Care of Pinkeye at Home (01:30)  Your health professional recommends that you watch this short online health video.  Learn ways " to ease the discomfort of pinkeye and keep the infection from spreading.  Purpose:  Describes basic home care for pinkeye to ease discomfort and prevent the spread of the infection.  Goal:  User will learn home treatment for pinkeye.     How to watch the video    Scan the QR code   OR Visit the website    https://link.[employer].Constant Insight/r/Jldtfv36swwak   Current as of: June 6, 2023               Content Version: 13.8    5785-0038 [employer].   Care instructions adapted under license by your healthcare professional. If you have questions about a medical condition or this instruction, always ask your healthcare professional. [employer] disclaims any warranty or liability for your use of this information.

## 2023-12-07 NOTE — TELEPHONE ENCOUNTER
Patient completed the E-visit with Dr. Mar. Also had another one today. Kari Painter RN on 12/7/2023 at 3:42 PM

## 2023-12-12 ENCOUNTER — NURSE TRIAGE (OUTPATIENT)
Dept: FAMILY MEDICINE | Facility: CLINIC | Age: 43
End: 2023-12-12
Payer: COMMERCIAL

## 2023-12-12 DIAGNOSIS — R05.3 PERSISTENT COUGH FOR 3 WEEKS OR LONGER: Primary | ICD-10-CM

## 2023-12-12 DIAGNOSIS — R05.3 PERSISTENT COUGH FOR 3 WEEKS OR LONGER: ICD-10-CM

## 2023-12-12 RX ORDER — ALBUTEROL SULFATE 90 UG/1
2 AEROSOL, METERED RESPIRATORY (INHALATION) EVERY 4 HOURS PRN
Qty: 18 G | Refills: 1 | Status: SHIPPED | OUTPATIENT
Start: 2023-12-12 | End: 2024-05-13

## 2023-12-12 RX ORDER — INHALER, ASSIST DEVICES
SPACER (EA) MISCELLANEOUS
Qty: 1 EACH | Refills: 0 | Status: SHIPPED | OUTPATIENT
Start: 2023-12-12 | End: 2023-12-12

## 2023-12-12 RX ORDER — INHALER, ASSIST DEVICES
SPACER (EA) MISCELLANEOUS
Qty: 1 EACH | Refills: 0 | Status: SHIPPED | OUTPATIENT
Start: 2023-12-12

## 2023-12-12 RX ORDER — ALBUTEROL SULFATE 90 UG/1
2 AEROSOL, METERED RESPIRATORY (INHALATION) EVERY 4 HOURS PRN
Qty: 18 G | Refills: 1 | Status: SHIPPED | OUTPATIENT
Start: 2023-12-12 | End: 2023-12-12

## 2023-12-12 NOTE — TELEPHONE ENCOUNTER
"Nurse Triage SBAR    Is this a 2nd Level Triage? YES, LICENSED PRACTITIONER REVIEW IS REQUIRED    Situation: Patient is calling with concerns for cough    Background: Patient has an E-visit on 12/6/2023 and was given Dexamethasone 8mg. Woke up on 12/7/2023 and did another E-visit and was diagnosed with Pink eye. On 12/8/2023 patient woke up with trouble breathing. She went to the urgency room and was diagnosed with a sinus infection and Early onset of pneumonia.   She was given Doxycycline 100mg BID x7 days. On day 3 of abx. She was told she did not have pink eye.    Assessment: Patient is still having discomfort with coughing. Said her \"bronchi feels tight and itchy\". She is not having trouble breathing per patient. But has a hard time controlling her cough. She gets coughing spasms which once she starts coughing, it lasts for a while. Occasionally coughing up yellow phlegm. Pain in lungs from coughing. Pain is worse when she is in the middle of coughing. She has not been able to sleep. She has tried cough syrup, cough pills, cough perles but nothing seems to improve the cough.   She does not have an appetite so has not taken ibuprofen.   Taking tylenol for the pain which helps.  She is wondering if she needs an inhaler.   She has never used an inhaler or nebulizer before.     Protocol Recommended Disposition:   See Physician Within 24 Hours    Recommendation: RN advised that patient may need to be seen again in person.      Routed to provider      Reason for Disposition   [1] Continuous (nonstop) coughing interferes with work or school AND [2] no improvement using cough treatment per protocol    Additional Information   Negative: Severe difficulty breathing (e.g., struggling for each breath, speaks in single words)   Negative: Bluish (or gray) lips or face now   Negative: Difficult to awaken or acting confused (e.g., disoriented, slurred speech)   Negative: Stridor   Negative: Slow, shallow and weak breathing   " Negative: Sounds like a life-threatening emergency to the triager   Negative: MODERATE difficulty breathing (e.g., speaks in phrases, SOB even at rest, pulse 100-120)   Negative: Fever > 104 F (40 C)   Negative: [1] Taking antibiotic > 24 hours for pneumonia AND [2] fever > 103 F (39.4 C)   Negative: [1] Coughed up blood AND [2] large amount or blood clots (Exception: blood-tinged sputum)   Negative: Patient sounds very sick or weak to the triager   Negative: [1] Diabetes mellitus or weak immune system (e.g., HIV positive, cancer chemo, splenectomy, organ transplant, chronic steroids) AND [2] new onset of fever > 100.0 F (37.8 C)   Negative: [1] Taking antibiotic > 24 hours for pneumonia AND [2] breathing is worse   Negative: [1] Recent medical visit within 24 hours AND [2] symptoms worse (Exception: fever worse)    Protocols used: Pneumonia on Antibiotic Follow-up Call-A-    Kari Painter RN on 12/12/2023 at 12:12 PM

## 2023-12-12 NOTE — TELEPHONE ENCOUNTER
Patient needs an inhaler and spacer sent to [facility] in [place].   Kari Painter RN on 12/12/2023 at 3:35 PM

## 2023-12-12 NOTE — TELEPHONE ENCOUNTER
I have sent in an inhaler to the [facility].  She should request a spacer and also to have a demonstration of usage by the pharmacist.  I agree with the nursing assessment  Pia Mar M.D.

## 2023-12-12 NOTE — TELEPHONE ENCOUNTER
Can you also send a rx for a spacer? They needs a Rx to dispense those.   Kari Painter RN on 12/12/2023 at 12:35 PM

## 2023-12-19 ENCOUNTER — TELEPHONE (OUTPATIENT)
Dept: FAMILY MEDICINE | Facility: CLINIC | Age: 43
End: 2023-12-19

## 2023-12-19 ENCOUNTER — OFFICE VISIT (OUTPATIENT)
Dept: FAMILY MEDICINE | Facility: CLINIC | Age: 43
End: 2023-12-19
Payer: COMMERCIAL

## 2023-12-19 ENCOUNTER — ANCILLARY PROCEDURE (OUTPATIENT)
Dept: GENERAL RADIOLOGY | Facility: CLINIC | Age: 43
End: 2023-12-19
Attending: FAMILY MEDICINE
Payer: COMMERCIAL

## 2023-12-19 VITALS
TEMPERATURE: 98 F | BODY MASS INDEX: 20.25 KG/M2 | SYSTOLIC BLOOD PRESSURE: 122 MMHG | WEIGHT: 126 LBS | HEIGHT: 66 IN | DIASTOLIC BLOOD PRESSURE: 86 MMHG | OXYGEN SATURATION: 96 % | HEART RATE: 75 BPM

## 2023-12-19 DIAGNOSIS — R05.3 PERSISTENT COUGH FOR 3 WEEKS OR LONGER: ICD-10-CM

## 2023-12-19 DIAGNOSIS — L70.0 ACNE VULGARIS: ICD-10-CM

## 2023-12-19 DIAGNOSIS — R05.3 PERSISTENT COUGH FOR 3 WEEKS OR LONGER: Primary | ICD-10-CM

## 2023-12-19 DIAGNOSIS — J01.90 ACUTE SINUSITIS TREATED WITH ANTIBIOTICS IN THE PAST 60 DAYS: ICD-10-CM

## 2023-12-19 DIAGNOSIS — R45.4 IRRITABILITY: ICD-10-CM

## 2023-12-19 DIAGNOSIS — G43.839 MENSTRUAL MIGRAINE, INTRACTABLE, WITHOUT STATUS MIGRAINOSUS: ICD-10-CM

## 2023-12-19 DIAGNOSIS — F31.74 BIPOLAR DISORDER, IN FULL REMISSION, MOST RECENT EPISODE MANIC (H): ICD-10-CM

## 2023-12-19 LAB
ANION GAP SERPL CALCULATED.3IONS-SCNC: 15 MMOL/L (ref 7–15)
BASOPHILS # BLD AUTO: 0.1 10E3/UL (ref 0–0.2)
BASOPHILS NFR BLD AUTO: 0 %
BUN SERPL-MCNC: 15.7 MG/DL (ref 6–20)
CALCIUM SERPL-MCNC: 10.1 MG/DL (ref 8.6–10)
CHLORIDE SERPL-SCNC: 103 MMOL/L (ref 98–107)
CREAT SERPL-MCNC: 0.89 MG/DL (ref 0.51–0.95)
DEPRECATED HCO3 PLAS-SCNC: 15 MMOL/L (ref 22–29)
EGFRCR SERPLBLD CKD-EPI 2021: 81 ML/MIN/1.73M2
EOSINOPHIL # BLD AUTO: 0.3 10E3/UL (ref 0–0.7)
EOSINOPHIL NFR BLD AUTO: 2 %
ERYTHROCYTE [DISTWIDTH] IN BLOOD BY AUTOMATED COUNT: 11.6 % (ref 10–15)
GLUCOSE SERPL-MCNC: 75 MG/DL (ref 70–99)
HCT VFR BLD AUTO: 39.8 % (ref 35–47)
HGB BLD-MCNC: 13.6 G/DL (ref 11.7–15.7)
IMM GRANULOCYTES # BLD: 0.2 10E3/UL
IMM GRANULOCYTES NFR BLD: 1 %
LITHIUM SERPL-SCNC: 0.8 MMOL/L (ref 0.6–1.2)
LYMPHOCYTES # BLD AUTO: 2.4 10E3/UL (ref 0.8–5.3)
LYMPHOCYTES NFR BLD AUTO: 14 %
MCH RBC QN AUTO: 31.3 PG (ref 26.5–33)
MCHC RBC AUTO-ENTMCNC: 34.2 G/DL (ref 31.5–36.5)
MCV RBC AUTO: 92 FL (ref 78–100)
MONOCYTES # BLD AUTO: 1.1 10E3/UL (ref 0–1.3)
MONOCYTES NFR BLD AUTO: 7 %
NEUTROPHILS # BLD AUTO: 13.4 10E3/UL (ref 1.6–8.3)
NEUTROPHILS NFR BLD AUTO: 77 %
PLATELET # BLD AUTO: 379 10E3/UL (ref 150–450)
POTASSIUM SERPL-SCNC: 5 MMOL/L (ref 3.4–5.3)
RBC # BLD AUTO: 4.34 10E6/UL (ref 3.8–5.2)
SODIUM SERPL-SCNC: 133 MMOL/L (ref 135–145)
WBC # BLD AUTO: 17.4 10E3/UL (ref 4–11)

## 2023-12-19 PROCEDURE — 80178 ASSAY OF LITHIUM: CPT | Performed by: FAMILY MEDICINE

## 2023-12-19 PROCEDURE — 71046 X-RAY EXAM CHEST 2 VIEWS: CPT | Mod: TC | Performed by: RADIOLOGY

## 2023-12-19 PROCEDURE — 80048 BASIC METABOLIC PNL TOTAL CA: CPT | Performed by: FAMILY MEDICINE

## 2023-12-19 PROCEDURE — 85025 COMPLETE CBC W/AUTO DIFF WBC: CPT | Performed by: FAMILY MEDICINE

## 2023-12-19 PROCEDURE — 36415 COLL VENOUS BLD VENIPUNCTURE: CPT | Performed by: FAMILY MEDICINE

## 2023-12-19 PROCEDURE — 99214 OFFICE O/P EST MOD 30 MIN: CPT | Performed by: FAMILY MEDICINE

## 2023-12-19 RX ORDER — NORGESTIMATE AND ETHINYL ESTRADIOL 0.25-0.035
KIT ORAL
Qty: 112 TABLET | Refills: 4 | Status: SHIPPED | OUTPATIENT
Start: 2023-12-19

## 2023-12-19 RX ORDER — CODEINE PHOSPHATE AND GUAIFENESIN 10; 100 MG/5ML; MG/5ML
1-2 SOLUTION ORAL EVERY 4 HOURS PRN
Qty: 180 ML | Refills: 0 | Status: SHIPPED | OUTPATIENT
Start: 2023-12-19 | End: 2024-05-13

## 2023-12-19 RX ORDER — PREDNISONE 20 MG/1
40 TABLET ORAL DAILY
Qty: 10 TABLET | Refills: 1 | Status: SHIPPED | OUTPATIENT
Start: 2023-12-19 | End: 2023-12-24

## 2023-12-19 NOTE — TELEPHONE ENCOUNTER
Pharmacy is calling to clarify the augmentin sent in today.  Does provider want patient to be on the medication for 10 or 14 days?   They would need an updated quantity. She said could be via e-prescription or VO.    Routed to Dr. Mar to clarify.    Kari Painter RN on 12/19/2023 at 3:34 PM

## 2023-12-19 NOTE — PROGRESS NOTES
Assessment & Plan     Persistent cough for 3 weeks or longer  Neg cxray cbc with 17,000 wbc   - XR Chest 2 Views; Future  - CBC with platelets and differential; Future  - CBC with platelets and differential  - predniSONE (DELTASONE) 20 MG tablet; Take 2 tablets (40 mg) by mouth daily for 5 days  - guaiFENesin-codeine (ROBITUSSIN AC) 100-10 MG/5ML solution; Take 5-10 mLs by mouth every 4 hours as needed    Bipolar disorder, in full remission, most recent episode manic (H24)  Good control   - Basic metabolic panel  (Ca, Cl, CO2, Creat, Gluc, K, Na, BUN)  - Lithium level; Future  - Lithium level    Acute sinusitis treated with antibiotics in the past 60 days    - amoxicillin-clavulanate (AUGMENTIN) 875-125 MG tablet; Take 1 tablet by mouth 2 times daily for 14 days  - amoxicillin-clavulanate (AUGMENTIN) 875-125 MG tablet; Take 1 tablet by mouth 2 times daily for 14 days  Will treat for 2 weeks use afrin as needed in [place] to help wth the pressure         Pia Mar MD  [facility] JENA Gardner is a 43 year old, presenting for the following health issues:  Urgent Care (Follow Up)        12/19/2023     8:42 AM   Additional Questions   Roomed by Paulette COFFMAN CMA       History of Present Illness       Reason for visit:  Dx from [facility]rgency Room of Sinus infection and probable pneumonia.  Completed 7 day antibiotic course and still feeling sick.  Teeth hurt in jaw, plugged ears, heavy chest.  Feel like my body battery cant go over 75% since Thanksgiving.    She eats 4 or more servings of fruits and vegetables daily.She consumes 0 sweetened beverage(s) daily.She exercises with enough effort to increase her heart rate 20 to 29 minutes per day.  She exercises with enough effort to increase her heart rate 3 or less days per week.   She is taking medications regularly.   She had started feeling sick on the 11/24 the next day she was very ill she had tried working on Monday 11/28 she did not work  "that week and she did have a virtual visit where [facility] covid at home then she felt better for a few days then she got sick again it hit her in the middle of the days she had eyes glued stuck and she did e visit for the pink eye then she took 7 days of doxy she had then has had the cough             Review of Systems   As above       Objective    /86 (BP Location: Right arm, Patient Position: Sitting, Cuff Size: Adult Regular)   Pulse 75   Temp 98  F (36.7  C) (Tympanic)   Ht 1.67 m (5' 5.75\")   Wt 57.2 kg (126 lb)   SpO2 96%   BMI 20.49 kg/m    Body mass index is 20.49 kg/m .  Physical Exam   GENERAL: healthy, alert and no distress  HENT: ear canals and TM's normal, nose and mouth without ulcers or lesions  NECK: no adenopathy, no asymmetry, masses, or scars and thyroid normal to palpation  RESP: decreased breath sounds R lower posterior  CV: regular rate and rhythm, normal S1 S2, no S3 or S4, no murmur, click or rub, no peripheral edema and peripheral pulses strong    Results for orders placed or performed in visit on 12/19/23 (from the past 24 hour(s))   Basic metabolic panel  (Ca, Cl, CO2, Creat, Gluc, K, Na, BUN)   Result Value Ref Range    Sodium 133 (L) 135 - 145 mmol/L    Potassium 5.0 3.4 - 5.3 mmol/L    Chloride 103 98 - 107 mmol/L    Carbon Dioxide (CO2) 15 (L) 22 - 29 mmol/L    Anion Gap 15 7 - 15 mmol/L    Urea Nitrogen 15.7 6.0 - 20.0 mg/dL    Creatinine 0.89 0.51 - 0.95 mg/dL    GFR Estimate 81 >60 mL/min/1.73m2    Calcium 10.1 (H) 8.6 - 10.0 mg/dL    Glucose 75 70 - 99 mg/dL   Lithium level   Result Value Ref Range    Lithium 0.80 0.60 - 1.20 mmol/L   CBC with platelets and differential    Narrative    The following orders were created for panel order CBC with platelets and differential.  Procedure                               Abnormality         Status                     ---------                               -----------         ------                     CBC with platelets and " pavan..[[id number]]  Abnormal            Final result                 Please view results for these tests on the individual orders.   CBC with platelets and differential   Result Value Ref Range    WBC Count 17.4 (H) 4.0 - 11.0 10e3/uL    RBC Count 4.34 3.80 - 5.20 10e6/uL    Hemoglobin 13.6 11.7 - 15.7 g/dL    Hematocrit 39.8 35.0 - 47.0 %    MCV 92 78 - 100 fL    MCH 31.3 26.5 - 33.0 pg    MCHC 34.2 31.5 - 36.5 g/dL    RDW 11.6 10.0 - 15.0 %    Platelet Count 379 150 - 450 10e3/uL    % Neutrophils 77 %    % Lymphocytes 14 %    % Monocytes 7 %    % Eosinophils 2 %    % Basophils 0 %    % Immature Granulocytes 1 %    Absolute Neutrophils 13.4 (H) 1.6 - 8.3 10e3/uL    Absolute Lymphocytes 2.4 0.8 - 5.3 10e3/uL    Absolute Monocytes 1.1 0.0 - 1.3 10e3/uL    Absolute Eosinophils 0.3 0.0 - 0.7 10e3/uL    Absolute Basophils 0.1 0.0 - 0.2 10e3/uL    Absolute Immature Granulocytes 0.2 <=0.4 10e3/uL       Pia Mar M.D.

## 2024-01-12 ENCOUNTER — TELEPHONE (OUTPATIENT)
Dept: FAMILY MEDICINE | Facility: CLINIC | Age: 44
End: 2024-01-12
Payer: COMMERCIAL

## 2024-01-12 NOTE — TELEPHONE ENCOUNTER
Received call from Patient.  Saw Dr Mar on 12/19/23.  Got a little bit better but feels like she is relapsing.  Appointment with Dr Aguero scheduled for 1/16/24 at 1040.  Discussed red flag symptoms.  Verbalized understanding.  Franko Walker RN

## 2024-01-17 ENCOUNTER — VIRTUAL VISIT (OUTPATIENT)
Dept: FAMILY MEDICINE | Facility: CLINIC | Age: 44
End: 2024-01-17
Payer: COMMERCIAL

## 2024-01-17 DIAGNOSIS — F31.74 BIPOLAR DISORDER, IN FULL REMISSION, MOST RECENT EPISODE MANIC (H): ICD-10-CM

## 2024-01-17 DIAGNOSIS — R05.3 PERSISTENT COUGH FOR 3 WEEKS OR LONGER: Primary | ICD-10-CM

## 2024-01-17 PROCEDURE — 99213 OFFICE O/P EST LOW 20 MIN: CPT | Mod: 95 | Performed by: FAMILY MEDICINE

## 2024-01-17 NOTE — PROGRESS NOTES
Kendra is a 43 year old who is being evaluated via a billable video visit.      How would you like to obtain your AVS? MyChart  If the video visit is dropped, the invitation should be resent by: Send to e-mail at: [email or web address]  Will anyone else be joining your video visit? No          Assessment & Plan     Persistent cough for 3 weeks or longer    - Mononucleosis screen; Future  - EBV Capsid Antibody IgM; Future  - ESR: Erythrocyte sedimentation rate; Future  - CRP, inflammation; Future  - Comprehensive metabolic panel (BMP + Alb, Alk Phos, ALT, AST, Total. Bili, TP); Future  - CBC with platelets; Future  - CT Chest w Contrast; Future    Bipolar disorder, in full remission, most recent episode manic (H24)  Stable sees psychiatry           Follow up if not improving    Subjective   Kendra is a 43 year old, presenting for the following health issues:  RECHECK      History of Present Illness       Reason for visit:  Since last visit on 12/19/23 (and onset of 1st symptoms 11/24/23) low body low battery feeling than normal, itchy cough and chest, raspy voice,    She eats 4 or more servings of fruits and vegetables daily.She consumes 0 sweetened beverage(s) daily.She exercises with enough effort to increase her heart rate 60 or more minutes per day.  She exercises with enough effort to increase her heart rate 7 days per week.   She is taking medications regularly.     Sheis still coughing with sore throat and she has been having has the deep spasms shehas been using the inhjaler  this has been going on for a while     She is feeling very fatiged she is able to work ffrom home as she can         Objective           Vitals:  No vitals were obtained today due to virtual visit.      Review of Systems  Constitutional, HEENT, cardiovascular, pulmonary, gi and gu systems are negative, except as otherwise noted.  Physical Exam   GENERAL: alert and no distress  EYES: Eyes grossly normal to inspection.  No discharge or  erythema, or obvious scleral/conjunctival abnormalities.  RESP: No audible wheeze, cough, or visible cyanosis.    SKIN: Visible skin clear. No significant rash, abnormal pigmentation or lesions.  NEURO: Cranial nerves grossly intact.  Mentation and speech appropriate for age.  PSYCH: Appropriate affect, tone, and pace of words  No results found for any visits on 01/17/24.    No results found for any visits on 01/17/24.      Video-Visit Details    Type of service:  Video Visit     Originating Location (pt. Location): Home    Distant Location (provider location):  Off-site  Platform used for Video Visit: Katlyn  Signed Electronically by: Pia Mar MD

## 2024-01-18 ENCOUNTER — MYC MEDICAL ADVICE (OUTPATIENT)
Dept: FAMILY MEDICINE | Facility: CLINIC | Age: 44
End: 2024-01-18
Payer: COMMERCIAL

## 2024-01-18 DIAGNOSIS — R42 LIGHT-HEADED FEELING: ICD-10-CM

## 2024-01-18 DIAGNOSIS — E03.2 HYPOTHYROIDISM DUE TO MEDICATION: ICD-10-CM

## 2024-01-18 DIAGNOSIS — R11.0 NAUSEA: Primary | ICD-10-CM

## 2024-01-18 DIAGNOSIS — R51.9 CHRONIC DAILY HEADACHE: ICD-10-CM

## 2024-01-19 ENCOUNTER — E-VISIT (OUTPATIENT)
Dept: FAMILY MEDICINE | Facility: CLINIC | Age: 44
End: 2024-01-19
Payer: COMMERCIAL

## 2024-01-19 ENCOUNTER — LAB (OUTPATIENT)
Dept: LAB | Facility: CLINIC | Age: 44
End: 2024-01-19
Payer: COMMERCIAL

## 2024-01-19 ENCOUNTER — TELEPHONE (OUTPATIENT)
Dept: FAMILY MEDICINE | Facility: CLINIC | Age: 44
End: 2024-01-19

## 2024-01-19 DIAGNOSIS — R07.0 THROAT PAIN: ICD-10-CM

## 2024-01-19 DIAGNOSIS — R07.0 THROAT PAIN: Primary | ICD-10-CM

## 2024-01-19 DIAGNOSIS — R05.3 PERSISTENT COUGH FOR 3 WEEKS OR LONGER: ICD-10-CM

## 2024-01-19 LAB
ALBUMIN SERPL BCG-MCNC: 4.4 G/DL (ref 3.5–5.2)
ALP SERPL-CCNC: 76 U/L (ref 40–150)
ALT SERPL W P-5'-P-CCNC: 10 U/L (ref 0–50)
ANION GAP SERPL CALCULATED.3IONS-SCNC: 11 MMOL/L (ref 7–15)
AST SERPL W P-5'-P-CCNC: 28 U/L (ref 0–45)
BILIRUB SERPL-MCNC: 0.3 MG/DL
BUN SERPL-MCNC: 17 MG/DL (ref 6–20)
CALCIUM SERPL-MCNC: 9.2 MG/DL (ref 8.6–10)
CHLORIDE SERPL-SCNC: 104 MMOL/L (ref 98–107)
CREAT SERPL-MCNC: 0.81 MG/DL (ref 0.51–0.95)
CRP SERPL-MCNC: <3 MG/L
DEPRECATED HCO3 PLAS-SCNC: 20 MMOL/L (ref 22–29)
DEPRECATED S PYO AG THROAT QL EIA: NEGATIVE
EGFRCR SERPLBLD CKD-EPI 2021: >90 ML/MIN/1.73M2
ERYTHROCYTE [DISTWIDTH] IN BLOOD BY AUTOMATED COUNT: 13 % (ref 10–15)
ERYTHROCYTE [SEDIMENTATION RATE] IN BLOOD BY WESTERGREN METHOD: 9 MM/HR (ref 0–20)
FLUAV AG SPEC QL IA: POSITIVE
FLUBV AG SPEC QL IA: NEGATIVE
GLUCOSE SERPL-MCNC: 58 MG/DL (ref 70–99)
GROUP A STREP BY PCR: NOT DETECTED
HCT VFR BLD AUTO: 42.5 % (ref 35–47)
HGB BLD-MCNC: 13.6 G/DL (ref 11.7–15.7)
MCH RBC QN AUTO: 31.6 PG (ref 26.5–33)
MCHC RBC AUTO-ENTMCNC: 32 G/DL (ref 31.5–36.5)
MCV RBC AUTO: 99 FL (ref 78–100)
MONOCYTES NFR BLD AUTO: NEGATIVE %
PLATELET # BLD AUTO: 342 10E3/UL (ref 150–450)
POTASSIUM SERPL-SCNC: 4.2 MMOL/L (ref 3.4–5.3)
PROT SERPL-MCNC: 7.9 G/DL (ref 6.4–8.3)
RBC # BLD AUTO: 4.31 10E6/UL (ref 3.8–5.2)
SODIUM SERPL-SCNC: 135 MMOL/L (ref 135–145)
WBC # BLD AUTO: 10.4 10E3/UL (ref 4–11)

## 2024-01-19 PROCEDURE — 86665 EPSTEIN-BARR CAPSID VCA: CPT

## 2024-01-19 PROCEDURE — 86140 C-REACTIVE PROTEIN: CPT

## 2024-01-19 PROCEDURE — 99207 PR NON-BILLABLE SERV PER CHARTING: CPT | Performed by: FAMILY MEDICINE

## 2024-01-19 PROCEDURE — 87651 STREP A DNA AMP PROBE: CPT

## 2024-01-19 PROCEDURE — 85027 COMPLETE CBC AUTOMATED: CPT

## 2024-01-19 PROCEDURE — 85652 RBC SED RATE AUTOMATED: CPT

## 2024-01-19 PROCEDURE — 87804 INFLUENZA ASSAY W/OPTIC: CPT | Performed by: FAMILY MEDICINE

## 2024-01-19 PROCEDURE — 86308 HETEROPHILE ANTIBODY SCREEN: CPT

## 2024-01-19 PROCEDURE — 36415 COLL VENOUS BLD VENIPUNCTURE: CPT

## 2024-01-19 PROCEDURE — 80053 COMPREHEN METABOLIC PANEL: CPT

## 2024-01-19 NOTE — TELEPHONE ENCOUNTER
Call placed to Patient.  Recommended that Patient request evisit with Dr Mar to get order for strep test.  Verbalized understanding.  Franko Walker RN

## 2024-01-19 NOTE — TELEPHONE ENCOUNTER
Symptoms    Describe your symptoms: See 1/17/24 Virtual Visit symptoms. Persistent cough for 3 weeks / Sore Throat.  Pt is requesting Strep test order to have done with her Labs today. Please Advise.    Preferred Pharmacy:   [id number] [place] - [address]  [address] [place] [address]  Phone: [phone] Fax: [phone]      Could we send this information to you in [employer] or would you prefer to receive a phone call?:   Patient would prefer a phone call   Okay to leave a detailed message?: Yes at Home number on file [phone] (home)    [employer] [facility]

## 2024-01-22 LAB
EBV VCA IGM SER IA-ACNC: <10 U/ML
EBV VCA IGM SER IA-ACNC: NORMAL

## 2024-01-23 ENCOUNTER — TELEPHONE (OUTPATIENT)
Dept: NURSING | Facility: CLINIC | Age: 44
End: 2024-01-23
Payer: COMMERCIAL

## 2024-01-23 ENCOUNTER — TELEPHONE (OUTPATIENT)
Dept: FAMILY MEDICINE | Facility: CLINIC | Age: 44
End: 2024-01-23
Payer: COMMERCIAL

## 2024-01-23 NOTE — TELEPHONE ENCOUNTER
Are the instructions still the same as the gestational one? Fasting for 8-14 hours before hand?    Kari Painter RN on 1/23/2024 at 1:34 PM

## 2024-01-23 NOTE — TELEPHONE ENCOUNTER
Noted. RN called patient and reviewed instructions of fasting 8-14 hours before. Only having sips of water.  Encouraged patient to bring a snack to eat immediately after she is done with the lab test.   Patient understands.   Kari Painter RN on 1/23/2024 at 3:28 PM

## 2024-01-23 NOTE — TELEPHONE ENCOUNTER
PCP scheduled 1 hour glucose tolerance glucose tolerance test. Patient has questions on this test. Patient transferred to [place] to have questions answered.      PORSCHE AGUIRRE RN  [facility] nurse advisors  1/23/2024  11:27 AM

## 2024-01-23 NOTE — TELEPHONE ENCOUNTER
PCP scheduled 1 hour glucose tolerance glucose tolerance test. Patient has questions on this test. Patient transferred to [place] to have questions answered.     PORSCHE AGUIRRE RN  [facility] nurse advisors  1/23/2024  11:27 AM

## 2024-01-23 NOTE — TELEPHONE ENCOUNTER
I coulldn't find any other alternative than pregnancy. I have ordered gtt on no pregnant patients before to help diagnose hypoglycemia. Pia Mar M.D.

## 2024-01-23 NOTE — TELEPHONE ENCOUNTER
RN called patient. She is wondering if there are any specific instruction.   Patient is not pregnant.   RN can only find instructions for the gestational glucose tolerance test. Unsure if it is the same instruction?  Is this a OGTT or IGTT?  Also test is ordered as gestational, RN unsure if order needs to be changed to non pregnant glucose tolerance test.    Dr. Mar please advise.   Kari Painter RN on 1/23/2024 at 12:31 PM

## 2024-01-24 ENCOUNTER — LAB (OUTPATIENT)
Dept: LAB | Facility: CLINIC | Age: 44
End: 2024-01-24
Payer: COMMERCIAL

## 2024-01-24 DIAGNOSIS — R51.9 CHRONIC DAILY HEADACHE: ICD-10-CM

## 2024-01-24 DIAGNOSIS — R11.0 NAUSEA: Primary | ICD-10-CM

## 2024-01-24 DIAGNOSIS — E03.2 HYPOTHYROIDISM DUE TO MEDICATION: ICD-10-CM

## 2024-01-24 DIAGNOSIS — R11.0 NAUSEA: ICD-10-CM

## 2024-01-24 DIAGNOSIS — R42 LIGHT-HEADED FEELING: ICD-10-CM

## 2024-02-01 ENCOUNTER — LAB (OUTPATIENT)
Dept: LAB | Facility: CLINIC | Age: 44
End: 2024-02-01
Payer: COMMERCIAL

## 2024-02-01 DIAGNOSIS — R42 LIGHT-HEADED FEELING: ICD-10-CM

## 2024-02-01 DIAGNOSIS — R11.0 NAUSEA: ICD-10-CM

## 2024-02-01 LAB
FASTING STATUS PATIENT QL REPORTED: YES
GLUCOSE SERPL-MCNC: 79 MG/DL (ref 70–99)

## 2024-02-01 PROCEDURE — 36415 COLL VENOUS BLD VENIPUNCTURE: CPT

## 2024-02-01 PROCEDURE — 82947 ASSAY GLUCOSE BLOOD QUANT: CPT

## 2024-02-20 ENCOUNTER — PATIENT OUTREACH (OUTPATIENT)
Dept: ONCOLOGY | Facility: CLINIC | Age: 44
End: 2024-02-20
Payer: COMMERCIAL

## 2024-02-20 NOTE — PROGRESS NOTES
Writer received Cancer Risk Management Program referral, referred for:    sister age 39 breast cancer and uncle metastatic breast cancer age 60      Reviewed for appropriate plan, and sent to New Patient Scheduling for completion.

## 2024-04-13 ENCOUNTER — APPOINTMENT (OUTPATIENT)
Dept: RADIOLOGY | Facility: HOSPITAL | Age: 44
End: 2024-04-13
Attending: EMERGENCY MEDICINE
Payer: COMMERCIAL

## 2024-04-13 ENCOUNTER — HOSPITAL ENCOUNTER (EMERGENCY)
Facility: HOSPITAL | Age: 44
Discharge: HOME | End: 2024-04-13
Attending: EMERGENCY MEDICINE | Admitting: EMERGENCY MEDICINE
Payer: COMMERCIAL

## 2024-04-13 VITALS
HEART RATE: 51 BPM | TEMPERATURE: 98.8 F | BODY MASS INDEX: 21.14 KG/M2 | OXYGEN SATURATION: 100 % | WEIGHT: 130 LBS | DIASTOLIC BLOOD PRESSURE: 91 MMHG | RESPIRATION RATE: 18 BRPM | SYSTOLIC BLOOD PRESSURE: 149 MMHG

## 2024-04-13 DIAGNOSIS — S41.151A DOG BITE OF RIGHT ARM, INITIAL ENCOUNTER: ICD-10-CM
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
## 2024-04-13 PROCEDURE — 73080 X-RAY EXAM OF ELBOW: CPT | Mod: RT

## 2024-04-13 PROCEDURE — 250N000013 HC RX MED GY IP 250 OP 250 PS 637: Performed by: EMERGENCY MEDICINE

## 2024-04-13 PROCEDURE — 250N000009 HC RX 250: Performed by: EMERGENCY MEDICINE

## 2024-04-13 PROCEDURE — 271N000002 HC RX 271: Performed by: EMERGENCY MEDICINE

## 2024-04-13 PROCEDURE — 99283 EMERGENCY DEPT VISIT LOW MDM: CPT

## 2024-04-13 RX ORDER — GINSENG 100 MG
CAPSULE ORAL ONCE
Status: COMPLETED | OUTPATIENT
Start: 2024-04-13 | End: 2024-04-13

## 2024-04-13 RX ORDER — METHYLCELLULOSE 4000CPS 30 %
POWDER (GRAM) MISCELLANEOUS ONCE
Status: COMPLETED | OUTPATIENT
Start: 2024-04-13 | End: 2024-04-13

## 2024-04-13 RX ADMIN — Medication 3 ML: at 18:47

## 2024-04-13 RX ADMIN — BACITRACIN: 500 OINTMENT TOPICAL at 19:52

## 2024-04-13 RX ADMIN — METHYLCELLULOSE: 2 POWDER, FOR SOLUTION ORAL at 18:47

## 2024-04-13 RX ADMIN — AMOXICILLIN AND CLAVULANATE POTASSIUM 1 TABLET: 875; 125 TABLET, FILM COATED ORAL at 18:40

## 2024-04-13 ASSESSMENT — COLUMBIA-SUICIDE SEVERITY RATING SCALE - C-SSRS
6. HAVE YOU EVER DONE ANYTHING, STARTED TO DO ANYTHING, OR PREPARED TO DO ANYTHING TO END YOUR LIFE?: NO
1. IN THE PAST MONTH, HAVE YOU WISHED YOU WERE DEAD OR WISHED YOU COULD GO TO SLEEP AND NOT WAKE UP?: NO
2. HAVE YOU ACTUALLY HAD ANY THOUGHTS OF KILLING YOURSELF IN THE PAST MONTH?: NO

## 2024-04-13 ASSESSMENT — ACTIVITIES OF DAILY LIVING (ADL): ADLS_ACUITY_SCORE: 35

## 2024-04-13 NOTE — ED TRIAGE NOTES
The pt arrives with a dog bite on her right arm. The dog bit her while she was out for a run, unknown vaccination status of the dog. Last tdap 2021. [facility] the [facility] and sent here for rabies vaccinations.      Triage Assessment (Adult)       Row Name 04/13/24 1800          Triage Assessment    Airway WDL WDL        Respiratory WDL    Respiratory WDL WDL        Peripheral/Neurovascular WDL    Peripheral Neurovascular WDL WDL

## 2024-04-13 NOTE — ED PROVIDER NOTES
Emergency Department Encounter     Evaluation Date & Time:   2024  6:11 PM    CHIEF COMPLAINT:  Dog Bite      Triage Note:The pt arrives with a dog bite on her right arm. The dog bit her while she was out for a run, unknown vaccination status of the dog. Last tdap . Was seen at the [facility] and sent here for rabies vaccinations.      Triage Assessment (Adult)       Row Name 24 1807          Triage Assessment    Airway WDL WDL        Respiratory WDL    Respiratory WDL WDL        Peripheral/Neurovascular WDL    Peripheral Neurovascular WDL WDL                     Impression and Plan       FINAL IMPRESSION:    ICD-10-CM    1. Dog bite of right arm, initial encounter  S41.151A     W54.0XXA           ED COURSE & MEDICAL DECISION MAKIN:16 PM I met with the patient, obtained history, performed an initial exam, and discussed options and plan for diagnostics and treatment here in the ED.  6:34 Paged Dr. Koo, Rabies Hotline.  6:37 PM Spoke with Rabies Hotline.    43 year old female, history of anxiety, bipolar disorder and hypothyroidism, who presents for evaluation of a dog bite around her right elbow region sustained ~3 hours ago after a Serbian Mathews mix ran out of its yard after attacked her while she was on a run. She reports pain around the area of the bite and some numbness in her right fingers, but denies weakness and decreased ROM. She is right-hand dominant. Her last tetanus was in .     On exam, there are 2 puncture-like wounds (~1cm in length each) about the right elbow area with mild surrounding tissue swelling (see photos below). She has good ROM of the elbow with CMS intact.    X-rays right elbow performed and demonstrated normal joint spaces and alignment with no fracture, joint effusion or radiodense foreign body.    Her wounds were cleansed and bacitracin and dressings were applied. The wounds are small and not gaping. Wounds left open given increased risk of increased infection  with primary closure.     A police report was filed and both police and EMS were on the scene. The owner reported that the dog had not been to the vet for 2 years and was unsure of the date of its last rabies vaccination. I consulted with the Rabies Hotline who advised against rabies immunoglobulin and vaccination series given that the dog will be able to be monitored for the next 10 days.  Patient was advised to contact the owner (directly or through the police) to ensure that it is still alive and healthy at the end of 10 days and, if so she does not need to pursue rabies prophylaxis.     Patient discharged to home with follow-up with her primary care provider this upcoming week for a wound check.  She was given a prescription for Augmentin (given the first dose po in the ED) for infection prophylaxis.  Wound care and return precautions provided.  Patient stable throughout ED course.      At the conclusion of the encounter I discussed the results of all the tests and the disposition. The questions were answered. The patient and family acknowledged understanding and were agreeable with the care plan.    Medical Decision Making    History:  Obtained supplemental history:Supplemental history obtained?: Documented in chart and Family Member/Significant Other  Reviewed external records: External records reviewed?: Documented in chart    External consultation:  Did you consider but not order tests?: Work up considered but not performed and documented in chart, if applicable  Did you interpret images independently?: Independent interpretation of ECG and images noted in documentation, when applicable.  Consultation discussion with other provider:Did you involve another provider (consultant, MH, pharmacy, etc.)?: I discussed the care with another health care provider, see documentation for details. Rabies Hotline    Complicating factors:  Care impacted by chronic illness:Other: Anxiety, bipolar disorder, and  hypothyroidism  Care significantly affected by social determinants of health:N/A    Disposition considerations: Discharge. I prescribed additional prescription strength medication(s) as charted. See documentation for any additional details.    MEDICATIONS GIVEN IN THE EMERGENCY DEPARTMENT:  Medications   lido-EPINEPHrine-tetracaine (LET) topical gel GEL (3 mLs Topical $Given 4/13/24 1847)   methylcellulose powder ( Topical $Given 4/13/24 1847)   amoxicillin-clavulanate (AUGMENTIN) 875-125 MG per tablet 1 tablet (1 tablet Oral $Given 4/13/24 1840)   bacitracin ointment ( Topical $Given 4/13/24 1952)       NEW PRESCRIPTIONS STARTED AT TODAY'S ED VISIT:  Discharge Medication List as of 4/13/2024  7:54 PM          HPI     The history is provided by the patient and the spouse. No  was used.        Kesha Carter is a 43 year old female, history of anxiety, bipolar disorder and hypothyroidism, who presents to this ED via walk-in for evaluation of a dog bite.    Patient reports she was out for a run ~3 miles away from her home when a dog (Romansh Mathews mix) suddenly ran from dog owner's yard and bit patient on the right arm around right elbow at 1530 today (~3 hours ago). No injury or bites to other areas of her body. Endorses pain around area of dog bite and right finger numbness. Denies any weakness and decreased ROM. She is right-handed.     She spoke with the dog owner about whether dog received rabies vaccinations, but owner state they were unsure if dog is vaccinated for rabies as has not been to the vet in 2 years. Patient and spouse contacted the police and EMS arrived at the scene and they filed a report.     She [facility] to the [facility], but they referred her to the ED for rabies vaccination. They did not do an x-ray or any wound cares.     The date of her last tetanus was 2021.     She has otherwise been in her usual state of health and denies other complaints or concerns.    REVIEW OF  SYSTEMS:  All other systems reviewed and are negative.      Medical History     Past Medical History:   Diagnosis Date    Thyroid disease        Past Surgical History:   Procedure Laterality Date    C/SECTION, LOW TRANSVERSE      , Low Transverse    C/SECTION, LOW TRANSVERSE      , Low Transverse    DILATION AND CURETTAGE, HYSTEROSCOPY, ABLATE ENDOMETRIUM, COMBINED N/A 2022    Procedure: HYSTEROSCOPY, WITH DILATION AND CURETTAGE OF UTERUS, AND ENDOMETRIAL ABLATION USING MYOSURE;  Surgeon: Will Stanford MD;  Location: [facility] OR    LAPAROSCOPIC CHOLECYSTECTOMY N/A 2017    Procedure: LAPAROSCOPIC CHOLECYSTECTOMY;  Surgeon: Tani Brenner MD;  Location: [facility] OR    MAMMOPLASTY AUGMENTATION BILATERAL  2010    Implants.    TUBAL LIGATION      With C/S    ZZC REPAIR CRUCIATE LIGAMENT,KNEE  1996    Left acl repair       Family History   Problem Relation Age of Onset    Thyroid Disease Mother     Arthritis Mother     Hypertension Mother     Diabetes Mother     Anxiety Disorder Mother     Obesity Mother     Thyroid Disease Maternal Grandmother     Hypertension Maternal Grandmother     Cardiovascular Maternal Grandfather     Heart Disease Maternal Grandfather     Hypertension Maternal Grandfather     Hypertension Paternal Grandmother     Cerebrovascular Disease Paternal Grandfather     Obesity Sister        Social History     Tobacco Use    Smoking status: Former     Current packs/day: 0.00     Types: Cigarettes     Quit date: 2016     Years since quittin.6    Smokeless tobacco: Never   Vaping Use    Vaping status: Never Used   Substance Use Topics    Alcohol use: Not Currently    Drug use: No       amoxicillin-clavulanate (AUGMENTIN) 875-125 MG tablet  albuterol (PROAIR HFA/PROVENTIL HFA/VENTOLIN HFA) 108 (90 Base) MCG/ACT inhaler  cetirizine (ZYRTEC) 10 MG tablet  clonazePAM (KLONOPIN) 1 MG tablet  cyclobenzaprine (FLEXERIL) 5 MG tablet  guaiFENesin-codeine  (ROBITUSSIN AC) 100-10 MG/5ML solution  indomethacin (INDOCIN) 25 MG capsule  levothyroxine (SYNTHROID) 112 MCG tablet  lithium 600 MG capsule  multivitamin w/minerals (THERA-VIT-M) tablet  norgestimate-ethinyl estradiol (SPRINTEC 28) 0.25-35 MG-MCG tablet  ondansetron (ZOFRAN ODT) 8 MG ODT tab  Probiotic Product (PROBIOTIC DAILY PO)  scopolamine (TRANSDERM) 1 MG/3DAYS 72 hr patch  spacer (OPTICHAMBER LEXUS) holding chamber  spironolactone (ALDACTONE) 50 MG tablet  topiramate (TOPAMAX) 100 MG tablet  ZOLMitriptan (ZOMIG) 5 MG tablet        Physical Exam     First Vitals:  Patient Vitals for the past 24 hrs:   BP Temp Temp src Pulse Resp SpO2 Weight   04/13/24 1956 (!) 149/91 -- -- 51 -- 100 % --   04/13/24 1807 (!) 177/96 98.8  F (37.1  C) Oral 75 18 98 % 59 kg (130 lb)       PHYSICAL EXAM:   Physical Exam    GENERAL: Awake, alert.  In no acute distress.   HEENT: Normocephalic, atraumatic.   NECK: No stridor.  PULMONARY: Symmetrical breath sounds without distress.  Lungs clear to auscultation bilaterally without wheezes, rhonchi or rales.  CARDIO: Regular rate and rhythm.  No significant murmur, rub or gallop.  Radial pulses strong and symmetrical.  EXTREMITIES: There are 2 puncture-like wounds (~1cm in length each) about the right elbow area with mild surrounding tissue swelling. She has good ROM of the elbow (flexion, extension, pronation and supination). The RUE is warm and well perfused with strong and symmetrical radial pulses.  Strength 5/5 BL upper extremities (biceps flexion, triceps extension, median / radial / ulnar nerve distributions) with sensation to light touch grossly intact.    NEURO: Alert and oriented to person, place and time.  Cranial nerves grossly intact.  No focal motor deficit.  PSYCH: Normal mood and affect.  SKIN: Dog bite wound about right elbow with two puncture-like wounds, as described above and pictured below.            I, Ida Gonzalez, am serving as a scribe to document services  personally performed by Elizabeth Harris MD based on my observation and the provider's statements to me. I, Elizabeth Harris MD attest that Idaharvey Kelseyan is acting in a scribe capacity, has observed my performance of the services and has documented them in accordance with my direction.    Elizabeth Harris MD  Emergency Medicine  [facility] EMERGENCY DEPARTMENT           Elizabeth Harris MD  04/14/24 2266

## 2024-04-13 NOTE — DISCHARGE INSTRUCTIONS
Please follow-up with your Primary Care Provider within 3-5 days for a wound check; call to arrange appointment.      Return to the ER for signs of wound infection (pain, swelling, redness, pus draining from the wound), fever or other concerns.      Keep the wound clean and dry; it is ok to shower, but do not scrub the wound.  Avoid soaking the wound in potentially dirty water (no swimming, etc.) for 2 weeks.      Apply over-the-counter antibacterial ointment, such as bacitracin or neosporin, twice daily for the next 3-4 days and complete the full course of antibiotics.     Contact the dog owner in 10 days (today is day 0) to ensure that the dog is still alive and healthy; if not, you need to present to the ER for the rabies series.

## 2024-04-17 ENCOUNTER — OFFICE VISIT (OUTPATIENT)
Dept: FAMILY MEDICINE | Facility: CLINIC | Age: 44
End: 2024-04-17
Payer: COMMERCIAL

## 2024-04-17 VITALS
SYSTOLIC BLOOD PRESSURE: 108 MMHG | WEIGHT: 133 LBS | HEART RATE: 53 BPM | BODY MASS INDEX: 21.38 KG/M2 | DIASTOLIC BLOOD PRESSURE: 68 MMHG | RESPIRATION RATE: 14 BRPM | HEIGHT: 66 IN | TEMPERATURE: 97.6 F | OXYGEN SATURATION: 96 %

## 2024-04-17 DIAGNOSIS — M54.50 ACUTE BILATERAL LOW BACK PAIN WITHOUT SCIATICA: Primary | ICD-10-CM

## 2024-04-17 PROCEDURE — 99213 OFFICE O/P EST LOW 20 MIN: CPT | Performed by: FAMILY MEDICINE

## 2024-04-17 ASSESSMENT — PAIN SCALES - GENERAL: PAINLEVEL: MODERATE PAIN (4)

## 2024-04-17 NOTE — PROGRESS NOTES
"  Assessment & Plan     Acute bilateral low back pain without sciatica  She has had low back pain off and on in the past.  Has seen chiropractic in the past.  She feels a bit tight in lower back.  No radiation.   I think pt would be helpful.    - Physical Therapy  Referral; Future  F/unit(s) in 3 weeks for recheck   MED REC REQUIRE  Post Medication Reconciliation Status:       Belle Gardner is a 43 year old, presenting for the following health issues:  Animal Bite        4/17/2024     3:10 PM   Additional Questions   Roomed by Leann Khan CMA     Via the Health Maintenance questionnaire, the patient has reported the following services have been completed -Cervical Cancer Screening, this information has been sent to the abstraction team.  Animal Bite         -Here for a dog bite to her right elbow on Saturday 04/13/2024. Was a dog in the neighborhood.      ED/UC Followup:    Facility:  [facility]  Date of visit: 04/13/2024  Reason for visit: Dog bite  Current Status: She is still In pain. She is also experiencing some neck and back pain.      Reports she is doing fine emotionally but with further discussion reports had not slept for 2 nights until she got vaccination records on dog.   Review of Systems  Constitutional, HEENT, cardiovascular, pulmonary, gi and gu systems are negative, except as otherwise noted.      Objective    /68   Pulse 53   Temp 97.6  F (36.4  C) (Tympanic)   Resp 14   Ht 1.67 m (5' 5.75\")   Wt 60.3 kg (133 lb)   SpO2 96%   BMI 21.63 kg/m    Body mass index is 21.63 kg/m .  Physical Exam   GENERAL: alert and no distress  NECK: no adenopathy, no asymmetry, masses, or scars  RESP: lungs clear to auscultation - no rales, rhonchi or wheezes  CV: regular rate and rhythm, normal S1 S2, no S3 or S4, no murmur, click or rub, no peripheral edema  ABDOMEN: soft, nontender, no hepatosplenomegaly, no masses and bowel sounds normal  MS: no gross musculoskeletal " defects noted, no edema  2 puncture wounds with hematoma healing weel no  surrounding eruythema.  Has ful rom of elbow     Back:  Straight leg raise neg bilat.  Has tightness and tenderness in perilumbar muscles bilaterally.  Full rom          Signed Electronically by: Maurice Rodrigez MD

## 2024-04-26 ENCOUNTER — THERAPY VISIT (OUTPATIENT)
Dept: PHYSICAL THERAPY | Facility: CLINIC | Age: 44
End: 2024-04-26
Attending: FAMILY MEDICINE
Payer: COMMERCIAL

## 2024-04-26 DIAGNOSIS — M54.50 ACUTE BILATERAL LOW BACK PAIN WITHOUT SCIATICA: ICD-10-CM

## 2024-04-26 PROCEDURE — 97161 PT EVAL LOW COMPLEX 20 MIN: CPT | Mod: GP

## 2024-04-26 PROCEDURE — 97110 THERAPEUTIC EXERCISES: CPT | Mod: GP

## 2024-04-26 PROCEDURE — 97140 MANUAL THERAPY 1/> REGIONS: CPT | Mod: GP

## 2024-04-26 NOTE — PROGRESS NOTES
"PHYSICAL THERAPY EVALUATION  Type of Visit: Evaluation    See electronic medical record for Abuse and Falls Screening details.    Subjective       Presenting condition or subjective complaint: Dog bite ran out an bit me on right elbow and motion carried me to the ground while i was out running in the neighborhood. She feels that her back and hips are twisted to the right all day every day. She feels like this is different from the past as it is not radiating and feels a constant pressure in low back that makes her feel \"twisted\". She is still able to run and do her day to day activities but feels a constant 3/10 discomfort.   Date of onset: 04/13/24      Prior therapy history for the same diagnosis, illness or injury: Yes Back pain in the past    Living Environment  Social support: With a significant other or spouse   Type of home: House; Multi-level; Basement   Stairs to enter the home: Yes 2 Is there a railing: Yes   Ramp: No   Stairs inside the home: Yes 16 Is there a railing: Yes   Help at home: Self Cares (home health aide/personal care attendant, family, etc); Home management tasks (cooking, cleaning); Medication and/or finances; Home and Yard maintenance tasks; Assist for driving and community activities; Meals on wheels/meal service  Equipment owned:       Employment: Yes Director of IT  Hobbies/Interests: Running, hiking, reading    Patient goals for therapy: Running without pain in lower back/ hips    Pain assessment: See objective evaluation for additional pain details     Objective   LUMBAR SPINE EVALUATION  PAIN: Pain is Exacerbated By: running, daily mobility, sleeping.   Pain is Relieved By: stretch and staying active.     POSTURE: WNL  GAIT:   Weightbearing Status: WBAT  Assistive Device(s): None  Gait Deviations:  Decreased L trunk rotation, R lateral hip motion.   BALANCE/PROPRIOCEPTION: WNL  WEIGHTBEARING ALIGNMENT: WNL  NON-WEIGHTBEARING ALIGNMENT: WNL   ROM/STRENGTH:   (Degrees) Left AROM Left " MMT  Right AROM Right MMT   Hip Flexion WNL 5 WNL 5   Hip Extension       Hip Abduction  4+  4+   Hip Adduction       Hip Internal Rotation WNL 5 WNL 5   Hip External Rotation WNL 5 WNL 5   Knee Flexion WNL 5 WNL 5   Knee Extension WNL 5 WNL 5   Lumbar Side glide To joint line To femoral condyle (pain)   Lumbar Flexion Fingertips to malleoli (tension in R SIJ region)   Lumbar Extension 50% limited (tension in R SIJ region)     PELVIC/SI SCREEN: Standing Forward Bend: hypomobile ALEX Gerri (March): hypomobile on R.   MYOTOMES: WNL  DTR S: WNL  CORD SIGNS: WNL  DERMATOMES: WNL  NEURAL TENSION: Lumbar WNL  FLEXIBILITY: WNL  LUMBAR/HIP Special Tests:  Positive quadrant screen in R posterior for pain/tension in R SIJ region.     PELVIS/SI SPECIAL TESTS:  R upslip and L anterior rotation.   FUNCTIONAL TESTS: Double Leg Squat: Able to perform full deep squat without pain  PALPATION:  Increased tension and TTP noted at R piriformis, glut med, lumbar PS, QL. Point tenderness at R SIJ and PSIS.   SPINAL SEGMENTAL CONCLUSIONS:  L4 FRSR       Assessment & Plan   CLINICAL IMPRESSIONS  Medical Diagnosis: Acute Bilateral Low back pain without sciaticAcute Bilateral Low back pain without sciatica    Treatment Diagnosis: Acute Bilateral Low back pain without sciaticAcute Bilateral Low back pain without sciatica   Impression/Assessment: Patient is a 43 year old female with low back pain complaints.  The following significant findings have been identified: Pain, Decreased ROM/flexibility, Decreased joint mobility, Impaired muscle performance, and Decreased activity tolerance. These impairments interfere with their ability to perform self care tasks, work tasks, recreational activities, household chores, driving , household mobility, and community mobility as compared to previous level of function.     Clinical Decision Making (Complexity):  Clinical Presentation: Evolving/Changing  Clinical Presentation Rationale: based on medical and  personal factors listed in PT evaluation  Clinical Decision Making (Complexity): Low complexity    PLAN OF CARE  Treatment Interventions:  Modalities: Cryotherapy, E-stim, Hot Pack, Ultrasound  Interventions: Gait Training, Manual Therapy, Neuromuscular Re-education, Therapeutic Activity, Therapeutic Exercise    Long Term Goals     PT Goal 1  Goal Identifier: LTG 1  Goal Description: Pt will be able to run for at least 7 miles at a time without having to take breaks due to discomfort.  Rationale: to maximize safety and independence with self cares;to maximize safety and independence with performance of ADLs and functional tasks  Target Date: 06/21/24      Frequency of Treatment: 1x EOW  Duration of Treatment: 8 weeks    Education Assessment:   Learner/Method: Patient;No Barriers to Learning    Risks and benefits of evaluation/treatment have been explained.   Patient/Family/caregiver agrees with Plan of Care.     Evaluation Time:     PT Eval, Low Complexity Minutes (11466): 20       Signing Clinician: Ann Mckeon, PT

## 2024-05-01 ENCOUNTER — VIRTUAL VISIT (OUTPATIENT)
Dept: ONCOLOGY | Facility: CLINIC | Age: 44
End: 2024-05-01
Attending: FAMILY MEDICINE
Payer: COMMERCIAL

## 2024-05-01 DIAGNOSIS — Z80.3 FAMILY HISTORY OF MALIGNANT NEOPLASM OF BREAST: Primary | ICD-10-CM

## 2024-05-01 PROCEDURE — 96040 HC GENETIC COUNSELING, EACH 30 MINUTES: CPT | Mod: GT,95 | Performed by: GENETIC COUNSELOR, MS

## 2024-05-01 NOTE — LETTER
May 1, 2024    Kesha Carter  [address] [id number]      Dear Kesha,    It was a pleasure speaking with you over video on 2024. Here is a copy of the progress note from our discussion. If you have any additional questions, please feel free to call.    Referring Provider: Pia Mar MD    Presenting Information:   I met with Kesha for her video genetic counseling visit, through the Cancer Risk Management Program, to discuss her family history of breast cancer. Today we reviewed this history, cancer screening recommendations, and available genetic testing options.    Personal History:  Kesha is a 43 year old year old female. She does not have any personal history of cancer. She had her first menstrual period at age 12, her first child at age 26, and is premenopausal. Kesha has her ovaries, fallopian tubes and uterus in place, and she has had no ovarian cancer screening to date. She reports that she has not used hormone replacement therapy.      She has annual clinical breast exams and mammograms; her most recent mammogram in 2023 was normal. Kesha began having colonoscopies at the age of 37. Her most recent colonoscopy in 2018 was normal and follow-up was recommended in 10 years. She does not regularly do any other cancer screening at this time. Kesha reported previous tobacco use for 10 years.    Family History: (Please see scanned pedigree for detailed family history information)  Kesha's sister was diagnosed with breast cancer at age 39. She had negative genetic testing for 11 genes through the Breast Actionable panel at the [facility]: QI, BARD1, BRCA1, BRCA2, CDH1, CHEK2, NF1, PALB2, PTEN, STK11, and TP53.  A paternal uncle  from breast cancer at age 68.  Kesha's father has a history of skin cancer that is due to excessive sun exposure.  Her maternal ethnicity is Slovakian, Greenlandic,  Polish, and English. Her paternal ethnicity is English, Radha, and Slovakian. There is no known Ashkenazi Caodaism ancestry on either side of her family. There is no reported consanguinity.    Discussion:  Kesha's family history of breast cancer is suggestive of a hereditary cancer syndrome.  We reviewed the features of sporadic, familial, and hereditary cancers. We discussed that mutations in either BRCA1 or BRCA2 could be possible hereditary explanations for her family history of cancer. Mutations in the BRCA1 or BRCA2 gene are known to cause Hereditary Breast and Ovarian Cancer Syndrome (HBOC). HBOC typically presents with multiple family members diagnosed with breast cancer before age 50 and/or ovarian cancer. Other cancer risks associated with HBOC include male breast cancer, prostate cancer, pancreatic cancer, and melanoma.   Of note, her sister tested negative for BRCA1 and BRCA2, along with 9 other genes (see above). This significantly lowers the risk for hereditary breast cancer for Kesha. However, Kesha's sister did have a more limited genetic test and her paternal uncle was diagnosed with breast cancer, so it would still be recommended that Kesha pursue genetic testing to better understand her risk for hereditary cancer.  We discussed the natural history and genetics of hereditary cancer. A detailed handout regarding hereditary cancer, along with the other information we discussed, will be mailed to Kesha at the end of our appointment today and can be found in the after visit summary. Topics included: inheritance pattern, cancer risks, cancer screening recommendations, and also risks, benefits and limitations of testing.  Based on her personal and family history, Kesha meets current National Comprehensive Cancer Network (NCCN) criteria for genetic testing of BRCA1/2 along with other high-penetrance breast cancer susceptibility genes (I.e. CDH1, PALB2, PTEN, and TP53).  We discussed  that there are additional genes that could cause increased risk for breast cancer. As many of these genes present with overlapping features in a family and accurate cancer risk cannot always be established based upon the pedigree analysis alone, it would be reasonable for Kesha to consider panel genetic testing to analyze multiple genes at once.  Genetic testing is available for BRCA1/2 as part of the patient's core panel. This will then be automatically reflexed to [facility]'s Common Hereditary Cancers panel.  Genetic testing is available for 48 genes associated with hereditary cancer: Common Hereditary Cancers panel (APC, QI, AXIN2, BAP1, BARD1, BMPR1A, BRCA1, BRCA2, BRIP1, CDH1, CDK4, CDKN2A, CHEK2, CTNNA1, DICER1, EPCAM, FH, GREM1, HOXB13, KIT, MBD4, MEN1, MLH1, MSH2, MSH3, MSH6, MUTYH, NF1, NTHL1, PALB2, PDGFRA, PMS2, POLD1, POLE, PTEN, RAD51C, RAD51D, SDHA, SDHB, SDHC, SDHD, SMAD4, SMARCA4, STK11, TP53, TSC1, TSC2, and VHL).  We discussed that many of the genes in the Common Hereditary Cancers panel are associated with specific hereditary cancer syndromes and published management guidelines: Hereditary Breast and Ovarian Cancer syndrome (BRCA1, BRCA2), Kat syndrome (MLH1, MSH2, MSH6, PMS2, EPCAM), Familial Adenomatous Polyposis (APC), Hereditary Diffuse Gastric Cancer (CDH1), Familial Atypical Multiple Mole Melanoma syndrome (CDK4, CDKN2A), Juvenile Polyposis syndrome (BMPR1A, SMAD4), Cowden syndrome (PTEN), Li Fraumeni syndrome (TP53), Peutz-Jeghers syndrome (STK11), MUTYH Associated Polyposis (MUTYH), Tuberous Sclerosis complex (TSC1, TSC2), Neurofibromatosis type 1 (NF1), Hereditary Leiomyomatosis and Renal Cell Carcinoma (FH), Multiple Endocrine Neoplasia type 1 (MEN1), Hereditary Paraganglioma and Pheochromocytoma (SDHA, SDHB, SDHC, SDHD), and von Hippel-Lindau (VHL).   The QI, AXIN2, BRIP1, CHEK2, GREM1, MSH3, NTHL1, PALB2, POLD1, POLE, RAD51C, and RAD51D genes are associated with increased cancer  risk and have published management guidelines for certain cancers.    The remaining genes (BAP1, BARD1, CTNNA1, DICER1, HOXB13, KIT, MBD4, PDGFRA, and SMARCA4) are associated with increased cancer risk and may allow us to make medical recommendations when mutations are identified.    Kesha would like to submit a blood sample for her genetic testing. She will go to her [facility] at her earliest convenience to get her blood drawn for her genetic testing.   Verbal consent was given over video and written on the consent form. Turnaround time is approximately 4 weeks once the lab receives the sample.    Should Kesha have any questions regarding the billing for her genetic testing, she should visit [email or web address]'s billing website at [email or web address] or call them at [phone].  Medical Management: For Kesha, we reviewed that the information from genetic testing may determine:  additional cancer screening for which Kesha may qualify (i.e. mammogram and breast MRI, more frequent colonoscopies, more frequent dermatologic exams, etc.),  options for risk reducing surgeries Kesha could consider (i.e. bilateral mastectomy, surgery to remove her ovaries and/or uterus, etc.),    and targeted chemotherapies if she were to develop certain cancers in the future (i.e. immunotherapy for individuals with Kat syndrome, PARP inhibitors, etc.).   These recommendations and possible targeted chemotherapies will be discussed in detail once genetic testing is completed.     Plan:  1) Today Kesha elected to proceed with BRCA1/2 testing with automatic reflex to [email or web address]'s Common Hereditary Cancers panel.  2) A copy of the consent form and the after visit summary will be sent to Kesha.  3) This information should be available in approximately 4 weeks, once the lab receives the sample.  4) I will call Kesha with the results once they become  available.    Time spent on video: 40 minutes    Channing Milton MS, [facility] [place]  Licensed, Certified Genetic Counselor

## 2024-05-01 NOTE — NURSING NOTE
Is the patient currently in the state of [place]? YES    Visit mode:VIDEO    If the visit is dropped, the patient can be reconnected by: VIDEO VISIT: Send to e-mail at: [email or web address]    Will anyone else be joining the visit? NO  (If patient encounters technical issues they should call [phone]    How would you like to obtain your AVS? MyChart    Are changes needed to the allergy or medication list? N/A    Reason for visit: Consult      Madeline BARRIOS

## 2024-05-01 NOTE — Clinical Note
"    5/1/2024         RE: Kesha Carter  [address] [address]        Dear Colleague,    Thank you for referring your patient, Kesha Carter, to the [facility]. Please see a copy of my visit note below.    Virtual Visit Details    Type of service:  Video Visit     Originating Location (pt. Location): {video visit patient location:214225::\"Home\"}  {PROVIDER LOCATION On-site should be selected for visits conducted from your clinic location or adjoining [facility] hospital, academic office, or other nearby [facility] building. Off-site should be selected for all other provider locations, including home:248254}  Distant Location (provider location):  {virtual location provider:[id number]}  Platform used for Video Visit: {Virtual Visit Platforms:763239::\"[employer]\"}    5/1/2024    Referring Provider: Pia Mar MD    Presenting Information:   I met with Kesha for her video genetic counseling visit, through the Cancer Risk Management Program, to discuss her family history of breast cancer. Today we reviewed this history, cancer screening recommendations, and available genetic testing options.    Personal History:  Kesha is a 43 year old year old female. She does not have any personal history of cancer. She had her first menstrual period at age ***, her first child at age ***, and is ***. ***Kesha has her ovaries, fallopian tubes and uterus in place, and she has had *** ovarian cancer screening to date. She reports that she *** hormone replacement therapy.      She has annual clinical breast exams and mammograms; her most recent mammogram in November 2023 was normal. Kesha began having colonoscopies at the age of 37. Her most recent colonoscopy in January 2018 was normal and follow-up was recommended in 10 years. She does not regularly do any other cancer screening at this time. Kesha reported previous tobacco use for 10 years.    Family History: (Please see " scanned pedigree for detailed family history information)  Kesha's sister was diagnosed with breast cancer at age 39. She had negative genetic testing for 11 genes through the Breast Actionable panel at the [facility] ([facility] at [facility]: QI, BARD1, BRCA1, BRCA2, CDH1, CHEK2, NF1, PALB2, PTEN, STK11, and TP53.  A paternal uncle was diagnosed with breast cancer at age *** and  at age 68.  Her maternal ethnicity is Slovakian, Ukrainian, Polish, and Croatian. Her paternal ethnicity is Croatian, Upper sorbian, and Slovakian. There is no known Ashkenazi Gnosticist ancestry on either side of her family. There is no reported consanguinity.    Discussion:  Kesha's family history of breast cancer is suggestive of a hereditary cancer syndrome.  We reviewed the features of sporadic, familial, and hereditary cancers. We discussed that mutations in either BRCA1 or BRCA2 could be possible hereditary explanations for her family history of cancer. Mutations in the BRCA1 or BRCA2 gene are known to cause Hereditary Breast and Ovarian Cancer Syndrome (HBOC). HBOC typically presents with multiple family members diagnosed with breast cancer before age 50 and/or ovarian cancer. Other cancer risks associated with HBOC include male breast cancer, prostate cancer, pancreatic cancer, and melanoma.   Of note, her sister tested negative for BRCA1 and BRCA2, along with 9 other genes (see above). This significantly lowers the risk for hereditary breast cancer for Kesha. However, Kesha's sister did have a more limited genetic test and her paternal uncle was diagnosed with breast cancer, so it would still be recommended that Kesha pursue genetic testing to better understand her risk for hereditary cancer.  We discussed the natural history and genetics of hereditary cancer. A detailed handout regarding hereditary cancer, along with the other information we discussed, will be mailed to Kesha at the end of our  appointment today and can be found in the after visit summary. Topics included: inheritance pattern, cancer risks, cancer screening recommendations, and also risks, benefits and limitations of testing.  Based on her personal and family history, Kesha meets current National Comprehensive Cancer Network (NCCN) criteria for genetic testing of BRCA1/2 along with other high-penetrance breast cancer susceptibility genes (I.e. CDH1, PALB2, PTEN, and TP53).  We discussed that there are additional genes that could cause increased risk for breast cancer. As many of these genes present with overlapping features in a family and accurate cancer risk cannot always be established based upon the pedigree analysis alone, it would be reasonable for Kesha to consider panel genetic testing to analyze multiple genes at once.  ***  ***Kesha stated that she would prefer to submit a saliva kit for her genetic testing. [employer] will send a kit directly to her home with directions on how to collect a saliva sample. We discussed that there is a small chance for sample failure due to contamination of the sample. To help minimize this, she should follow the directions that are sent with the kit. Kesha verbalized understanding of this. Once the sample is collected, she will send it to [employer] using the return envelope and prepaid shipping label.   Verbal consent was given over video and written on the consent form. Turnaround time is approximately 4 weeks once the lab receives the sample.  Should Kesha have any questions regarding the billing for her genetic testing, she should visit [employer]'s billing website at [email or web address] or call them at [phone].  Medical Management: For Kesha, we reviewed that the information from genetic testing may determine:  additional cancer screening for which Kesha may qualify (i.e. mammogram and breast MRI, more frequent colonoscopies,  more frequent dermatologic exams, etc.),  options for risk reducing surgeries Kesha could consider (i.e. bilateral mastectomy, surgery to remove her ovaries and/or uterus, etc.***),    and targeted chemotherapies if she were to develop certain cancers in the future (i.e. immunotherapy for individuals with Kat syndrome, PARP inhibitors, etc.).   These recommendations and possible targeted chemotherapies will be discussed in detail once genetic testing is completed.     Plan:  1) Today Kesha elected to proceed with *** testing with automatic reflex to ***.  2) A copy of the consent form and the after visit summary will be sent to Kesha.  3) This information should be available in approximately 4 weeks, once the lab receives the sample.  4) I will call Kesha with the results once they become available.    Time spent on video: *** minutes    Channing Milton MS, [facility]  Licensed, Certified Genetic Counselor    ***    5/1/2024    Referring Provider: Pia Mar MD    Presenting Information:   I met with Kesha for her video genetic counseling visit, through the Cancer Risk Management Program, to discuss her family history of breast cancer. Today we reviewed this history, cancer screening recommendations, and available genetic testing options.    Personal History:  Kesha is a 43 year old year old female. She does not have any personal history of cancer. She had her first menstrual period at age ***, her first child at age ***, and is ***. ***Kesha has her ovaries, fallopian tubes and uterus in place, and she has had *** ovarian cancer screening to date. She reports that she *** hormone replacement therapy.      She has annual clinical breast exams and mammograms; her most recent mammogram in November 2023 was normal. Kesha began having colonoscopies at the age of 37. Her most recent colonoscopy in January 2018 was normal and follow-up was recommended in 10 years. She does not regularly do any  other cancer screening at this time. Kesha reported previous tobacco use for 10 years.    Family History: (Please see scanned pedigree for detailed family history information)  Kesha's sister was diagnosed with breast cancer at age 39. She had negative genetic testing for 11 genes through the Breast Actionable panel at the [facility] ([facility] at [facility]: QI, BARD1, BRCA1, BRCA2, CDH1, CHEK2, NF1, PALB2, PTEN, STK11, and TP53.  A paternal uncle was diagnosed with breast cancer at age *** and  at age 68.  Her maternal ethnicity is Slovakian, Romansh, Polish, and Lithuanian. Her paternal ethnicity is Lithuanian, Radha, and Slovakian. There is no known Ashkenazi Adventist ancestry on either side of her family. There is no reported consanguinity.    Discussion:  Kesha's family history of breast cancer is suggestive of a hereditary cancer syndrome.  We reviewed the features of sporadic, familial, and hereditary cancers. We discussed that mutations in either BRCA1 or BRCA2 could be possible hereditary explanations for her family history of cancer. Mutations in the BRCA1 or BRCA2 gene are known to cause Hereditary Breast and Ovarian Cancer Syndrome (HBOC). HBOC typically presents with multiple family members diagnosed with breast cancer before age 50 and/or ovarian cancer. Other cancer risks associated with HBOC include male breast cancer, prostate cancer, pancreatic cancer, and melanoma.   Of note, her sister tested negative for BRCA1 and BRCA2, along with 9 other genes (see above). This significantly lowers the risk for hereditary breast cancer for Kesha. However, Kesha's sister did have a more limited genetic test and her paternal uncle was diagnosed with breast cancer, so it would still be recommended that Kesha pursue genetic testing to better understand her risk for hereditary cancer.  We discussed the natural history and genetics of hereditary cancer. A detailed handout  regarding hereditary cancer, along with the other information we discussed, will be mailed to Kesha at the end of our appointment today and can be found in the after visit summary. Topics included: inheritance pattern, cancer risks, cancer screening recommendations, and also risks, benefits and limitations of testing.  Based on her personal and family history, Kesha meets current National Comprehensive Cancer Network (NCCN) criteria for genetic testing of BRCA1/2 along with other high-penetrance breast cancer susceptibility genes (I.e. CDH1, PALB2, PTEN, and TP53).  We discussed that there are additional genes that could cause increased risk for breast cancer. As many of these genes present with overlapping features in a family and accurate cancer risk cannot always be established based upon the pedigree analysis alone, it would be reasonable for Kesha to consider panel genetic testing to analyze multiple genes at once.  ***  ***Kesha stated that she would prefer to submit a saliva kit for her genetic testing. [employer] will send a kit directly to her home with directions on how to collect a saliva sample. We discussed that there is a small chance for sample failure due to contamination of the sample. To help minimize this, she should follow the directions that are sent with the kit. Kesha verbalized understanding of this. Once the sample is collected, she will send it to [email or web address] using the return envelope and prepaid shipping label.   Verbal consent was given over video and written on the consent form. Turnaround time is approximately 4 weeks once the lab receives the sample.  Should Kesha have any questions regarding the billing for her genetic testing, she should visit [email or web address]'s billing website at [email or web address] or call them at [phone].  Medical Management: For Kesha, we reviewed that the information from genetic testing may  determine:  additional cancer screening for which Kesha may qualify (i.e. mammogram and breast MRI, more frequent colonoscopies, more frequent dermatologic exams, etc.),  options for risk reducing surgeries Kesha could consider (i.e. bilateral mastectomy, surgery to remove her ovaries and/or uterus, etc.***),    and targeted chemotherapies if she were to develop certain cancers in the future (i.e. immunotherapy for individuals with Kat syndrome, PARP inhibitors, etc.).   These recommendations and possible targeted chemotherapies will be discussed in detail once genetic testing is completed.     Plan:  1) Today Kesha elected to proceed with *** testing with automatic reflex to ***.  2) A copy of the consent form and the after visit summary will be sent to Kesha.  3) This information should be available in approximately 4 weeks, once the lab receives the sample.  4) I will call Kesha with the results once they become available.    Time spent on video: *** minutes    Channing Milton MS, [facility]  Licensed, Certified Genetic Counselor    ***  Virtual Visit Details    Type of service:  Video Visit     Originating Location (pt. Location): Home  Distant Location (provider location):  Off-site  Platform used for Video Visit: [employer]amiánWell      Again, thank you for allowing me to participate in the care of your patient.        Sincerely,        Channing Milton GC

## 2024-05-01 NOTE — PROGRESS NOTES
2024    Referring Provider: Pia Mar MD    Presenting Information:   I met with Kesha for her video genetic counseling visit, through the Cancer Risk Management Program, to discuss her family history of breast cancer. Today we reviewed this history, cancer screening recommendations, and available genetic testing options.    Personal History:  Kesha is a 43 year old year old female. She does not have any personal history of cancer. She had her first menstrual period at age 12, her first child at age 26, and is premenopausal. Kesha has her ovaries, fallopian tubes and uterus in place, and she has had no ovarian cancer screening to date. She reports that she has not used hormone replacement therapy.      She has annual clinical breast exams and mammograms; her most recent mammogram in 2023 was normal. Kesha began having colonoscopies at the age of 37. Her most recent colonoscopy in 2018 was normal and follow-up was recommended in 10 years. She does not regularly do any other cancer screening at this time. Kesha reported previous tobacco use for 10 years.    Family History: (Please see scanned pedigree for detailed family history information)  Kesha's sister was diagnosed with breast cancer at age 39. She had negative genetic testing for 11 genes through the Breast Actionable panel at the [facility] ([facility] at [facility]: QI, BARD1, BRCA1, BRCA2, CDH1, CHEK2, NF1, PALB2, PTEN, STK11, and TP53.  A paternal uncle  from breast cancer at age 68.  Kesha's father has a history of skin cancer that is due to excessive sun exposure.  Her maternal ethnicity is Slovakian, Monegasque, Polish, and Yakut. Her paternal ethnicity is Yakut, Radha, and Slovakian. There is no known Ashkenazi Sabianism ancestry on either side of her family. There is no reported consanguinity.    Discussion:  Kesha's family history of breast cancer is suggestive of a  Nara is mildly nearsighted with mild astigmatism.  Her RX hasn't actually changed since last year.  Her eyes look healthy & she has good depth perception.   hereditary cancer syndrome.  We reviewed the features of sporadic, familial, and hereditary cancers. We discussed that mutations in either BRCA1 or BRCA2 could be possible hereditary explanations for her family history of cancer. Mutations in the BRCA1 or BRCA2 gene are known to cause Hereditary Breast and Ovarian Cancer Syndrome (HBOC). HBOC typically presents with multiple family members diagnosed with breast cancer before age 50 and/or ovarian cancer. Other cancer risks associated with HBOC include male breast cancer, prostate cancer, pancreatic cancer, and melanoma.   Of note, her sister tested negative for BRCA1 and BRCA2, along with 9 other genes (see above). This significantly lowers the risk for hereditary breast cancer for Kesha. However, Kesha's sister did have a more limited genetic test and her paternal uncle was diagnosed with breast cancer, so it would still be recommended that Kesha pursue genetic testing to better understand her risk for hereditary cancer.  We discussed the natural history and genetics of hereditary cancer. A detailed handout regarding hereditary cancer, along with the other information we discussed, will be mailed to Kesha at the end of our appointment today and can be found in the after visit summary. Topics included: inheritance pattern, cancer risks, cancer screening recommendations, and also risks, benefits and limitations of testing.  Based on her personal and family history, Kesha meets current National Comprehensive Cancer Network (NCCN) criteria for genetic testing of BRCA1/2 along with other high-penetrance breast cancer susceptibility genes (I.e. CDH1, PALB2, PTEN, and TP53).  We discussed that there are additional genes that could cause increased risk for breast cancer. As many of these genes present with overlapping features in a family and accurate cancer risk cannot always be established based upon the pedigree analysis alone, it would be reasonable  for Kesha to consider panel genetic testing to analyze multiple genes at once.  Genetic testing is available for BRCA1/2 as part of the patient's core panel. This will then be automatically reflexed to [facility]'s Common Hereditary Cancers panel.  Genetic testing is available for 48 genes associated with hereditary cancer: Common Hereditary Cancers panel (APC, QI, AXIN2, BAP1, BARD1, BMPR1A, BRCA1, BRCA2, BRIP1, CDH1, CDK4, CDKN2A, CHEK2, CTNNA1, DICER1, EPCAM, FH, GREM1, HOXB13, KIT, MBD4, MEN1, MLH1, MSH2, MSH3, MSH6, MUTYH, NF1, NTHL1, PALB2, PDGFRA, PMS2, POLD1, POLE, PTEN, RAD51C, RAD51D, SDHA, SDHB, SDHC, SDHD, SMAD4, SMARCA4, STK11, TP53, TSC1, TSC2, and VHL).  We discussed that many of the genes in the Common Hereditary Cancers panel are associated with specific hereditary cancer syndromes and published management guidelines: Hereditary Breast and Ovarian Cancer syndrome (BRCA1, BRCA2), Kat syndrome (MLH1, MSH2, MSH6, PMS2, EPCAM), Familial Adenomatous Polyposis (APC), Hereditary Diffuse Gastric Cancer (CDH1), Familial Atypical Multiple Mole Melanoma syndrome (CDK4, CDKN2A), Juvenile Polyposis syndrome (BMPR1A, SMAD4), Cowden syndrome (PTEN), Li Fraumeni syndrome (TP53), Peutz-Jeghers syndrome (STK11), MUTYH Associated Polyposis (MUTYH), Tuberous Sclerosis complex (TSC1, TSC2), Neurofibromatosis type 1 (NF1), Hereditary Leiomyomatosis and Renal Cell Carcinoma (FH), Multiple Endocrine Neoplasia type 1 (MEN1), Hereditary Paraganglioma and Pheochromocytoma (SDHA, SDHB, SDHC, SDHD), and von Hippel-Lindau (VHL).   The QI, AXIN2, BRIP1, CHEK2, GREM1, MSH3, NTHL1, PALB2, POLD1, POLE, RAD51C, and RAD51D genes are associated with increased cancer risk and have published management guidelines for certain cancers.    The remaining genes (BAP1, BARD1, CTNNA1, DICER1, HOXB13, KIT, MBD4, PDGFRA, and SMARCA4) are associated with increased cancer risk and may allow us to make medical recommendations when mutations are  identified.    Kesha would like to submit a blood sample for her genetic testing. She will go to her nearest [facility] at her earliest convenience to get her blood drawn for her genetic testing.   Verbal consent was given over video and written on the consent form. Turnaround time is approximately 4 weeks once the lab receives the sample.  Should Kesha have any questions regarding the billing for her genetic testing, she should visit [employer]'s billing website at [email or web address] or call them at [phone].  Medical Management: For Kesha, we reviewed that the information from genetic testing may determine:  additional cancer screening for which Kesha may qualify (i.e. mammogram and breast MRI, more frequent colonoscopies, more frequent dermatologic exams, etc.),  options for risk reducing surgeries Kesha could consider (i.e. bilateral mastectomy, surgery to remove her ovaries and/or uterus, etc.),    and targeted chemotherapies if she were to develop certain cancers in the future (i.e. immunotherapy for individuals with Kat syndrome, PARP inhibitors, etc.).   These recommendations and possible targeted chemotherapies will be discussed in detail once genetic testing is completed.     Plan:  1) Today Kesha elected to proceed with BRCA1/2 testing with automatic reflex to [facility]'s Common Hereditary Cancers panel.  2) A copy of the consent form and the after visit summary will be sent to Kesha.  3) This information should be available in approximately 4 weeks, once the lab receives the sample.  4) I will call Kesha with the results once they become available.    Time spent on video: 40 minutes    Channing Milton MS, [facility]  Licensed, Certified Genetic Counselor      Virtual Visit Details    Type of service:  Video Visit     Originating Location (pt. Location): Home  Distant Location (provider location):  Off-site  Platform used  for Video Visit: Katlyn

## 2024-05-01 NOTE — PATIENT INSTRUCTIONS
Assessing Cancer Risk  Cancer is a common diagnosis which impacts many families.  Individuals may develop cancer due to environmental factors (such as exposures and lifestyle), aging, genetic predisposition, or a combination of these factors.      Only about 5-10% of cancers are thought to be due to an inherited cancer susceptibility gene.    These families often have:  Several people with the same or related types of cancer  Cancers diagnosed at a young age (before age 50)  Individuals with more than one primary cancer  Multiple generations of the family affected with cancer    Comprehensive Breast and Gynecologic Cancer Panel  We each inherit two copies of every gene in our bodies: one from our mother, and one from our father. Each gene has a specific job to do.  When a gene has a mistake or  mutation  in it, it does not work like it should.     Some people may be candidates for genetic testing of more than one gene.  For these families, genetic testing using a cancer panel may be offered. These panels will test different genes at once known to increase the risk for breast, ovarian, uterine, and/or other cancers.    This handout will review common hereditary breast and gynecologic cancer syndromes. The genes that will be discussed in this handout are: QI, BRCA1, BRCA2, BRIP1, CDH1, CHEK2, MLH1, MSH2, MSH6, PMS2, EPCAM, PTEN, PALB2, RAD51C, RAD51D, and TP53.    The purpose of this handout is to serve as a brief summary of the breast and gynecologic cancer risk genes that have published clinical management guidelines for individuals who are found to carry a mutation. Inheriting a mutation does not mean a person will develop cancer, but it does significantly increase their risk above the general population risk.     ______________________________________________________________________________    Hereditary Breast and Ovarian Cancer Syndrome (BRCA1 and BRCA2)  A single mutation in one of the copies of BRCA1 or  BRCA2 increases the risk for breast and ovarian cancer, among others.  The risk for pancreatic cancer and melanoma may also be slightly increased in some families.  The chart below shows the chance that someone with a BRCA mutation would develop cancer in his or her lifetime1,2,3,4.       Lifetime Cancer Risks    General Population BRCA1  BRCA2   Breast  12% >60% >60%   Ovarian  1-2% 39-58% 13-29%   Prostate 12% 7-26% 19-61%   Male Breast 0.1% 0.2-1.2% 1.8-7.1%   Pancreas 1-2% Up to 5% 5-10%     A person s ethnic background is also important to consider, as individuals of Ashkenazi [place] ancestry have a higher chance of having a BRCA gene mutation.  There are three BRCA mutations that occur more frequently in this population.      Kat Syndrome (MLH1, MSH2, MSH6, PMS2, and EPCAM)  Currently five genes are known to cause Kat Syndrome: MLH1, MSH2, MSH6, PMS2, and EPCAM.  A single mutation in one of the Kat Syndrome genes increases the risk for colon, endometrial, ovarian, and stomach cancers.  Other cancers that occur less commonly in Kat Syndrome include urinary tract, skin, and brain cancers.  The chart below shows the chance that a person with Kat syndrome would develop cancer in his or her lifetime5.      Lifetime Cancer Risks    General Population Kat Syndrome   Colon 5% 10-61%   Endometrial 3% 13-57%   Ovarian 1-2% 1-38%   Stomach <1% 1-9%   *Cancer risk varies depending on Kat syndrome gene found      Cowden Syndrome (PTEN)  Cowden syndrome is a hereditary condition that increases the risk for breast, thyroid, endometrial, colon, and kidney cancer.  Cowden syndrome is caused by a mutation in the PTEN gene.  A single mutation in one of the copies of PTEN causes Cowden syndrome and increases cancer risk.  The chart below shows the chance that someone with a PTEN mutation would develop cancer in their lifetime6,7.  Other benign features seen in some individuals with Cowden syndrome include benign  skin lesions (facial papules, keratoses, lipomas), learning disability, autism, thyroid nodules, colon polyps, and larger head size.     Lifetime Cancer Risks    General Population Cowden   Breast 12% 40-60%*   Thyroid 1% Up to 38%   Renal 1-2% Up to 35%   Endometrial 3% Up to 28%   Colon 5% Up to 9%   Melanoma 2-3% Up to 6%   *Emerging data suggests the risk for breast cancer could be greater than 60%               Li-Fraumeni Syndrome (TP53)  Li-Fraumeni Syndrome (LFS) is a cancer predisposition syndrome caused by a mutation in the TP53 gene. A single mutation in one of the copies of TP53 increases the risk for multiple cancers. Individuals with LFS are at an increased risk for developing cancer at a young age. The lifetime risk for development of a LFS-associated cancer is 50% by age 30 and 90% by age 60.   Core Cancers: Sarcomas, Breast, Brain, Lung, Leukemias/Lymphomas, Adrenocortical carcinomas  Other Cancers: Gastrointestinal, Thyroid, Skin, Genitourinary       Hereditary Diffuse Gastric Cancer (CDH1)  Currently, one gene is known to cause hereditary diffuse gastric cancer (HDGC): CDH1.  Individuals with HDGC are at increased risk for diffuse gastric cancer and lobular breast cancer. Of people diagnosed with HDGC, 30-50% have a mutation in the CDH1 gene.  This suggests there are likely other genes that may cause HDGC that have not been identified yet.      Lifetime Cancer Risks    General Population HDGC   Diffuse Gastric  <1% ~80%   Breast 12% 41-60%       Additional Genes    QI  QI is a moderate-risk breast cancer gene. Women who have a mutation in QI can have between a 2-4 fold increased risk for breast cancer compared to the general population8. QI mutations have also been associated with increased risk for pancreatic cancer between 5-10%9. Individuals who inherit two QI mutations have a condition called ataxia-telangiectasia (AT).  This rare autosomal recessive condition affects the nervous system  and immune system, and is associated with progressive cerebellar ataxia beginning in childhood. Individuals with ataxia-telangiectasia often have a weakened immune system and have an increased risk for childhood cancers.    PALB2  Mutations in PALB2 have been shown to increase the risk of breast cancer up to 41-60% in some families; where individuals fall within this risk range is dependent upon family [id number]. PALB2 mutations have also been associated with increased risk for pancreatic cancer between 5-10%.  Individuals who inherit two PALB2 mutations--one from their mother and one from their father--have a condition called Fanconi Anemia.  This rare autosomal recessive condition is associated with short stature, developmental delay, bone marrow failure, and increased risk for childhood cancers.    CHEK2   CHEK2 is a moderate-risk breast cancer gene.  Women who have a mutation in CHEK2 have around a 2-4 fold increased risk for breast cancer compared to the general population, and this risk may be higher depending upon family history.11,12,13 The risk of colon cancer may be twice as high as the general population risk of colon cancer of 5%. Mutations in CHEK2 have also been shown to increase the risk of other cancers, including prostate, however these cancer risks are currently not well understood.    BRIP1, RAD51C and RAD51D  Mutations in RAD51C and RAD51D have been shown to increase the risk of ovarian cancer and breast cancer 14,. Mutations in BRIP1 have been shown to increase the risk of ovarian cancer and possibly female breast cancer 15 .       Lifetime Cancer Risk    General Population        BRIP1   RAD51C  RAD51D   Breast 12% Not well defined 20-40% 20-40%   Ovarian 1-2% 5-15% 10-15% 10-20%     ______________________________________________________________  Inheritance  All of the cancer syndromes reviewed above are inherited in an autosomal dominant pattern.  This means that if a parent has a mutation,  each of their children will have a 50% chance of inheriting that same mutation. Therefore, each child --male or female-- would have a 50% chance of being at increased risk for developing cancer.    Image obtained from Genetics Home Reference, 2013     Mutations in some genes can occur de thaddeus, which means that a person s mutation occurred for the first time in them and was not inherited from a parent.  Now that they have the mutation, however, it can be passed on to future generations.    Genetic Testing  Genetic testing involves a blood test and will look for any harmful mutations that are associated with increased cancer risk.  If possible, it is recommended that the person(s) who has had cancer be tested before other family members.  That person will give us the most useful information about whether or not a specific gene is associated with the cancer in the family.    Results  There are three possible results of genetic testing:  Positive--a harmful mutation was identified in one or more of the genes  Negative--no mutations were identified in any of the genes tested  Variant of unknown significance--a variation in one of the genes was identified, but it is unclear how this impacts cancer risk in the family    Advantages and Disadvantages   There are advantages and disadvantages to genetic testing.    Advantages  May clarify your cancer risk  Can help you make medical decisions  May explain the cancers in your family  May give useful information to your family members (if you share your results)    Disadvantages  Possible negative emotional impact of learning about inherited cancer risk  Uncertainty in interpreting a negative test result in some situations  Possible genetic discrimination concerns (see below)    Genetic Information Nondiscrimination Act (DEVANG)  The Genetic Information Nondiscrimination Act of 2008 (DEVANG) is a federal law that protects individuals from health insurance or employment discrimination  based on a genetic test result alone (with some exceptions, including employers with fewer than 15 employees, and ).  Although rare, DEVANG  does not cover discrimination protections in terms of life insurance, long term care, or disability insurances.  Visit the National Human [employer] [place] website to learn more.    Reducing Cancer Risk  All of the genes described in this handout have nationally recognized cancer screening guidelines that would be recommended for individuals who test positive.  In addition to increased cancer screening, surgeries may be offered or recommended to reduce cancer risk.  Recommendations are based upon an individual s genetic test result as well as their personal and family history of cancer.    Questions to Think About Regarding Genetic Testing:  What effect will the test result have on me and my relationship with my family members if I have an inherited gene mutation?  If I don t have a gene mutation?  Should I share my test results, and how will my family react to this news, which may also affect them?  Are my children ready to learn new information that may one day affect their own health?    Hereditary Cancer Resources    FORCE: Facing Our Risk of Cancer Empowered facingourrisk.org   Bright Pink bebrightpink.org   Li-Fraumeni Syndrome Association lfsassociation.org   PTEN World PTENworld.com   No stomach for cancer, Inc. nostomachforcancer.org   Stomach cancer relief network Scrnet.org   Collaborative Group of the Americas on Inherited Colorectal Cancer (CGA) cgaicc.com    Cancer Care cancercare.org   American Cancer Society (ACS) cancer.org   National Cancer [place] (NCI) cancer.gov     Please call us if you have any questions or concerns.   Cancer Risk Management Program [phone]-CANCER ([phone])  Channing Milton, [place] [facility] [place]  [phone]  Emily Palencia, MS, [facility] [place] [facility] [phone]  Dominga Booth, MS, [facility] [place] [facility]  [phone]  Evie Duckworth, MS, [facility] [place] [facility]  [phone]  Winter Rodríguez,  [place], [facility] [place]  [phone]  Ainsley Ornelas, [place], [facility] [place] [phone]  Layne Bowling, [place], [facility] [place] [facility] [place] [phone]    References  Huber Cavazos PDP, Sharad S, Evelyn DEY, Laly JE, Junie JL, Teddy N, Tenzin H, Jud O, Doc A, Pasini B, Radiedwina P, Manmegan S, Lu DM, Medel N, Christiano E, Ashley H, Gill E, Dickson J, Gronjuana J, Tasha B, Tulinius H, Thorlacius S, Eerola H, Nevanlinna H, Wanda K, Dylon OP. Average risks of breast and ovarian cancer associated with BRCA1 or BRCA2 mutations detected in case series unselected for family history: a combined analysis of 222 studies. Am J Hum Ally. 2003;72:1117-30.  Maryjo N, Amanda M, Alondra G.  BRCA1 and BRCA2 Hereditary Breast and Ovarian Cancer. Gene Reviews online. 2013.  Hunter YC, Juan Ramon S, Mya G, Francois S. Breast cancer risk among male BRCA1 and BRCA2 mutation carriers. J Natl Cancer Inst. 2007;99:1811-4.  Arsenio ANDERSON, Tiffanie I, Shay J, Saray E, Nick ER, Jose F. Risk of breast cancer in male BRCA2 carriers. J Med Ally. 2010;47:710-1.  National Comprehensive Cancer Network. Clinical practice guidelines in oncology, colorectal cancer screening. Available online (registration required). 2015.  Be MH, Jayjay J, Rehana J, Trey ALEXANDRA, Spencer MS, Eng C. Lifetime cancer risks in individuals with germline PTEN mutations. Clin Cancer Res. 2012;18:400-7.  Renetta R. Cowden Syndrome: A Critical Review of the Clinical Literature. J Ally . 2009:18:13-27.  Roya ADAMS, Martínez HEMPHILL, Maynor S, Carol Ann P, Lise T, Birdie M, Jeff B, Drew H, Marcy R, Edita K, Latrell L, Arsenio ANDERSON, Lu HEMPHILL, Van DF, Luana MR, The Breast Cancer Susceptibility Collaboration (UK) & Demetria COLMENARES. QI mutations that cause ataxia-telangiectasia are breast cancer susceptibility alleles. Nature Genetics. 2006;38:873-875  Elvis N , Luis Y, Starla J, Ba L, Baljinder ARMENTA , Isaura ML, Christian S, Geri AG, Marci S, Alondra ML, Aren J , Colleen R, Lin GAMBINO, Isaiah  JR, Fabiana VE, Rachel M, Vokikistein B, Jhon N, Cristiana RH, Frida KW, and Kevin AP. QI mutations in patients with hereditary pancreatic cancer. Cancer Discover. 2012;2:41-46  Lida ARCHER., et al. Breast-Cancer Risk in Families with Mutations in PALB2. NEJM. 2014; 371(6):497-506.  CHEK2 Breast Cancer Case-Control Consortium. CHEK2*1100delC and susceptibility to breast cancer: A collaborative analysis involving 10,860 breast cancer cases and 9,065 controls from 10 studies. Am J Hum Ally, 74 (2004), pp. 4817-7139  Zeenat T, Jose S, Sangeeta K, et al. Spectrum of Mutations in BRCA1, BRCA2, CHEK2, and TP53 in Families at High Risk of Breast Cancer. BAL. 2006;295(12):6406-5227.   Lisa C, Myles D, Maribell ADAMS, et al. Risk of breast cancer in women with a CHEK2 mutation with and without a family history of breast cancer. J Clin Oncol. 2011;29:5178-9004.  Song H, Rennys E, Ramus SJ, et al. Contribution of germline mutations in the RAD51B, RAD51C, and RAD51D genes to ovarian cancer in the population. J Clin Oncol. 2015;33(26):1596-6084. Doi:10.1200/JCO.2015.61.2408.  Kallie T, Brian DF, Valeria P, et al. Mutations in BRIP1 confer high risk of ovarian cancer. Crystal Ally. 2011;43(11):5130-4907. doi:10.1038/ng.955.

## 2024-05-06 ENCOUNTER — LAB (OUTPATIENT)
Dept: LAB | Facility: CLINIC | Age: 44
End: 2024-05-06
Payer: COMMERCIAL

## 2024-05-06 DIAGNOSIS — Z80.3 FAMILY HISTORY OF MALIGNANT NEOPLASM OF BREAST: ICD-10-CM

## 2024-05-06 PROCEDURE — 36415 COLL VENOUS BLD VENIPUNCTURE: CPT

## 2024-05-06 PROCEDURE — 99000 SPECIMEN HANDLING OFFICE-LAB: CPT

## 2024-05-13 ENCOUNTER — VIRTUAL VISIT (OUTPATIENT)
Dept: FAMILY MEDICINE | Facility: CLINIC | Age: 44
End: 2024-05-13
Payer: COMMERCIAL

## 2024-05-13 DIAGNOSIS — F43.10 PTSD (POST-TRAUMATIC STRESS DISORDER): Primary | ICD-10-CM
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
## 2024-05-13 PROCEDURE — 99213 OFFICE O/P EST LOW 20 MIN: CPT | Mod: 95 | Performed by: FAMILY MEDICINE

## 2024-05-13 NOTE — PROGRESS NOTES
Kendra is a 43 year old who is being evaluated via a billable video visit.    How would you like to obtain your AVS? MyChart  If the video visit is dropped, the invitation should be resent by: Send to e-mail at: [email or web address]  Will anyone else be joining your video visit? No      Assessment & Plan     PTSD (post-traumatic stress disorder)  Emdr referral follow up with mental health provider   - Adult Mental Health  Referral; Future    Dog bite, subsequent encounter  Healed           MED REC REQUIRED  Post Medication Reconciliation Status: discharge medications reconciled, continue medications without change    CONSULTATION/REFERRAL to therapist     Subjective   Kendra is a 43 year old, presenting for the following health issues:  RECHECK (Dog bite ED 4/13/24 and office visit 4/17/24. )    HPI     Dog bite 4/13 and she is healing fine she has been having a hard time with being outside cannot run and will not be alone outside she has finally gone on a walk but she had to have her  with her . Running outside is one of her stress releasers.  Discussed the PTSD nature of an incident like this. She is able to sleep now had a hard time in the beginning . She is running on a treadmill but this is not the same as being out side.  She does have a psychiatrist and she will call for recommendation      Otherwise well it was aneighborhood dog so no rabies prophylaxis is needed           Objective           Vitals:  No vitals were obtained today due to virtual visit.    Physical Exam   GENERAL: alert and no distress  EYES: Eyes grossly normal to inspection.  No discharge or erythema, or obvious scleral/conjunctival abnormalities.  RESP: No audible wheeze, cough, or visible cyanosis.    SKIN: Visible skin clear. No significant rash, abnormal pigmentation or lesions.  NEURO: Cranial nerves grossly intact.  Mentation and speech appropriate for age.  PSYCH:MENTAL STATUS EXAM:    1. Clinical observations:  Kesha was clean and was adequately groomed. Kesha's emotional presentation was open and cooperative. She spoke clear and articulate. She maintained good eye contact and she was cooperative in answering questions.   2. She appeared to be well-oriented in all spheres with coherent, logical, goal directed, and relevent thinking.   3. Thought content: She denies no abnormal thought process.   4. Affect and mood: Kesha's affect is described as normal/appropriate and her emotional attitude was open and cooperative. She reports the following sypmtoms: nervous or tense feeling, fear of certain objects/situations, avoiding places, and fear of being in places escape would be difficult.    5. Sensorium and cognition: She was in contact with reality and oriented to time, place, and person.  She demonstrated no impairment in immediate, recent, or remote memory. Her insight was adequate and her  intelligence appeared to be average.              Video-Visit Details    Type of service:  Video Visit   Originating Location (pt. Location): Home    Distant Location (provider location):  Off-site  Platform used for Video Visit: Katlyn  Signed Electronically by: Pia Mar MD

## 2024-05-14 ENCOUNTER — TELEPHONE (OUTPATIENT)
Dept: FAMILY MEDICINE | Facility: CLINIC | Age: 44
End: 2024-05-14
Payer: COMMERCIAL

## 2024-05-14 LAB — SCANNED LAB RESULT: NORMAL

## 2024-05-14 NOTE — TELEPHONE ENCOUNTER
Prior Authorization Retail Medication Request    Medication/Dose: Synthroid  Diagnosis and ICD code (if different than what is on RX):    Hypothyroidism due to acquired atrophy of thyroid [E03.4]        New/renewal/insurance change PA/secondary ins. PA:  Previously Tried and Failed:    Rationale:      Insurance   Primary: [employer]  Insurance ID:  [id number]    Pharmacy Information (if different than what is on RX)  Name:  [facility]  Phone:  [phone]  Fax:[phone]

## 2024-05-14 NOTE — LETTER
Dear insurance people,  Kendra has been my patient for over 10 years.  She has tried multiple generic brands of levothyroxine.  The only 1 that does not cause her more GI symptoms is the name brand.  The name brand she can always get through having it written as dispense as written.  Please reconsider your insurance decision as this is not a medical problem it is an insurance issue.  It took her several years to get on the right combination of medications for her gastrointestinal issues along with her mental health.  She needs to have consistent brand and that only way to get it is doing dispense as written.  I would like you to truly reflect on your decision and to deny this PA.  It is likely that if your mother needed this it would get done.  Why do not you start treating the people you ensure like family members.  Thank you for your consideration      Sincerely.    Pia Mar M.D.

## 2024-05-14 NOTE — LETTER
Dear insurance people,  Kesha Carter has been my patient for over 10 years. She has had increase

## 2024-05-15 DIAGNOSIS — Z80.3 FAMILY HISTORY OF MALIGNANT NEOPLASM OF BREAST: Primary | ICD-10-CM

## 2024-05-19 DIAGNOSIS — R11.0 NAUSEA: ICD-10-CM

## 2024-05-19 DIAGNOSIS — Z87.898 HX OF MOTION SICKNESS: ICD-10-CM

## 2024-05-22 ENCOUNTER — TELEPHONE (OUTPATIENT)
Dept: FAMILY MEDICINE | Facility: CLINIC | Age: 44
End: 2024-05-22

## 2024-05-22 ENCOUNTER — THERAPY VISIT (OUTPATIENT)
Dept: PHYSICAL THERAPY | Facility: CLINIC | Age: 44
End: 2024-05-22
Payer: COMMERCIAL

## 2024-05-22 DIAGNOSIS — M54.50 ACUTE BILATERAL LOW BACK PAIN WITHOUT SCIATICA: Primary | ICD-10-CM

## 2024-05-22 PROCEDURE — 97140 MANUAL THERAPY 1/> REGIONS: CPT | Mod: GP | Performed by: PHYSICAL THERAPIST

## 2024-05-22 PROCEDURE — 97110 THERAPEUTIC EXERCISES: CPT | Mod: GP | Performed by: PHYSICAL THERAPIST

## 2024-05-22 RX ORDER — TOPIRAMATE 100 MG/1
100 TABLET, FILM COATED ORAL DAILY
Qty: 90 TABLET | Refills: 3 | Status: SHIPPED | OUTPATIENT
Start: 2024-05-22

## 2024-05-22 NOTE — TELEPHONE ENCOUNTER
Prior Auth will be entered by PA team and processed via standard protocol.    Emelyn Cunningham, MARIA E Cazares

## 2024-05-22 NOTE — PROGRESS NOTES
"5/23/2024    Referring Provider: Pia Mar MD    Presenting Information:  I spoke to Kesha by video today to discuss her genetic testing results. Her blood was drawn on 5/6/24. The Common Hereditary Cancers panel was ordered from [facility]. This testing was done because of Kesha's family history of breast cancer.    Genetic Testing Result: NEGATIVE  Kesha is negative for mutations in APC, QI, AXIN2, BAP1, BARD1, BMPR1A, BRCA1, BRCA2, BRIP1, CDH1, CDK4, CDKN2A, CHEK2, CTNNA1, DICER1, EPCAM, FH, GREM1, HOXB13, KIT, MBD4, MEN1, MLH1, MSH2, MSH3, MSH6, MUTYH, NF1, NTHL1, PALB2, PDGFRA, PMS2, POLD1, POLE, PTEN, RAD51C, RAD51D, SDHA, SDHB, SDHC, SDHD, SMAD4, SMARCA4, STK11, TP53, TSC1, TSC2, and VHL. No mutations were found in any of the 48 genes analyzed. This test involved sequencing and deletion/duplication analysis of all genes with the exceptions of EPCAM and GREM1 (deletions/duplications only) and SDHA (sequencing only).     A copy of the test report can be found in the Laboratory tab, dated 5/6/24, and named \"LABORATORY MISCELLANEOUS ORDER\". The report is scanned in as a linked document.    Interpretation:  We discussed several different interpretations of this negative test result.    One explanation may be that there is a different gene or combination of genes and environment that are associated with the cancers in this family.  It is possible that her sister or paternal uncle with breast cancer did have a mutation in one of these genes and she did not inherit it.  There is also a small possibility that there is a mutation in one of these genes, and the testing laboratory could not find it with their current testing methods.       Screening:  Based on this negative test result, it is important for Kesha and her relatives to refer back to the family history for appropriate cancer screening.    Based on her personal and family history, Kesha has a 17.8% lifetime risk of developing breast " cancer based on the JUAN FRANCISCO model. Therefore, Kesha does not meet current National Comprehensive Cancer Network (NCCN) guidelines for high risk breast screening, which is offered to women with a 20% lifetime risk or higher. However, it is still important for Kesha to continue with routine breast screening under the care of her physicians. Breast cancer screening is generally recommended to begin approximately 10 years younger than the earliest age of breast cancer diagnosis in the family, or at age 40, whichever comes first. In this family, screening may begin at age 29. Kesha is encouraged to discuss breast screening with her physicians.   Other population cancer screening options, such as those recommended by the American Cancer Society and the National Comprehensive Cancer Network (NCCN), are also appropriate for Kesha and her family. These screening recommendations may change if there are changes to Kesha's personal and/or family history of cancer. Final screening recommendations should be made by each individual's primary care provider.      Inheritance:  We reviewed autosomal dominant inheritance. We discussed that Kesha did not pass on an identifiable mutation in these genes to her children based on this test result. Mutations in these genes do not skip generations.      Additional Testing Considerations:  Although Kesha's genetic testing result was negative, other relatives may still carry a gene mutation associated with breast cancer. Genetic counseling is recommended for her sister to discuss genetic testing options. If she, or any other relatives do pursue genetic testing, Kesha is encouraged to contact me so that we may review the impact of their test results on her. Of note, her history is in the process of getting updated genetic testing.     Summary:  We do not have an explanation for Kesha's family history of cancer. While no genetic changes were identified, Kesha  may still be at risk for certain cancers due to family history, environmental factors, or other genetic causes not identified by this test. Because of that, it is important that she continue with cancer screening based on her personal and family history as discussed above.    Genetic testing is rapidly advancing, and new cancer susceptibility genes will most likely be identified in the future. Therefore, I encouraged Kesha to contact me annually or if there are changes in her personal or family history. This may change how we assess her cancer risk, screening, and the testing we would offer.    Plan:  1.  A copy of the test results will be sent to Kesha.  2. She plans to follow-up with her other providers.  3. She should contact me regularly, or sooner if her family history changes.  4. Kesha gave me permission to discuss her genetic testing and other information discussed at our appointment with her sister Ceci.    If Kesha has any further questions, I encouraged her to contact me via [email or web address].    Time spent on video: 8 minutes.    Channing Milton MS, [facility] [place]  Licensed, Certified Genetic Counselor      Virtual Visit Details    Type of service:  Video Visit     Originating Location (pt. Location): Home  Distant Location (provider location):  Off-site  Platform used for Video Visit: Katlyn

## 2024-05-22 NOTE — TELEPHONE ENCOUNTER
General Call      Reason for Call:  Patient needs Prior Auth for Synthroid    What are your questions or concerns:  Patient stated that she had 2 pills left and and the generic does not agree with her.     Date of last appointment with provider: 5/13/24    Could we [facility] this [facility] to you [facility] or would you prefer to receive a phone call?:   Patient would prefer a phone call   Okay to leave a detailed message?: Yes at Cell number on file:    Telephone Information:   Mobile [phone]       Sameera Hilliard  [facility], Primary Care

## 2024-05-23 ENCOUNTER — VIRTUAL VISIT (OUTPATIENT)
Dept: ONCOLOGY | Facility: CLINIC | Age: 44
End: 2024-05-23
Attending: GENETIC COUNSELOR, MS
Payer: COMMERCIAL

## 2024-05-23 DIAGNOSIS — Z80.3 FAMILY HISTORY OF MALIGNANT NEOPLASM OF BREAST: Primary | ICD-10-CM

## 2024-05-23 PROCEDURE — 999N000069 HC STATISTIC GENETIC COUNSELING, < 16 MIN: Mod: GT,95 | Performed by: GENETIC COUNSELOR, MS

## 2024-05-23 NOTE — LETTER
"May 23, 2024    Kesha Carter  [address] [address]      Dear Kesha,    It was a pleasure speaking with you over video on 5/23/2024. Here is a copy of the progress note from our discussion. If you have any additional questions, please feel free to call.    Referring Provider: Pia Mar MD    Presenting Information:  I spoke to Kesha by video today to discuss her genetic testing results. Her blood was drawn on 5/6/24. The Common Hereditary Cancers panel was ordered from [facility]. This testing was done because of Kesha's family history of breast cancer.    Genetic Testing Result: NEGATIVE  Kesha is negative for mutations in APC, QI, AXIN2, BAP1, BARD1, BMPR1A, BRCA1, BRCA2, BRIP1, CDH1, CDK4, CDKN2A, CHEK2, CTNNA1, DICER1, EPCAM, FH, GREM1, HOXB13, KIT, MBD4, MEN1, MLH1, MSH2, MSH3, MSH6, MUTYH, NF1, NTHL1, PALB2, PDGFRA, PMS2, POLD1, POLE, PTEN, RAD51C, RAD51D, SDHA, SDHB, SDHC, SDHD, SMAD4, SMARCA4, STK11, TP53, TSC1, TSC2, and VHL. No mutations were found in any of the 48 genes analyzed. This test involved sequencing and deletion/duplication analysis of all genes with the exceptions of EPCAM and GREM1 (deletions/duplications only) and SDHA (sequencing only).     A copy of the test report can be found in the Laboratory tab, dated 5/6/24, and named \"LABORATORY MISCELLANEOUS ORDER\". The report is scanned in as a linked document.    Interpretation:  We discussed several different interpretations of this negative test result.    One explanation may be that there is a different gene or combination of genes and environment that are associated with the cancers in this family.  It is possible that her sister or paternal uncle with breast cancer did have a mutation in one of these genes and she did not inherit it.  There is also a small possibility that there is a mutation in one of these genes, and the testing laboratory could not find it with their current testing methods.   "       Screening:  Based on this negative test result, it is important for Kesha and her relatives to refer back to the family history for appropriate cancer screening.    Based on her personal and family history, Kesha has a 17.8% lifetime risk of developing breast cancer based on the JUAN FRANCISCO model. Therefore, Kesha does not meet current National Comprehensive Cancer Network (NCCN) guidelines for high risk breast screening, which is offered to women with a 20% lifetime risk or higher. However, it is still important for Kesha to continue with routine breast screening under the care of her physicians. Breast cancer screening is generally recommended to begin approximately 10 years younger than the earliest age of breast cancer diagnosis in the family, or at age 40, whichever comes first. In this family, screening may begin at age 29. Kesha is encouraged to discuss breast screening with her physicians.   Other population cancer screening options, such as those recommended by the American Cancer Society and the National Comprehensive Cancer Network (NCCN), are also appropriate for Kesha and her family. These screening recommendations may change if there are changes to Kesha's personal and/or family history of cancer. Final screening recommendations should be made by each individual's primary care provider.      Inheritance:  We reviewed autosomal dominant inheritance. We discussed that Kesha did not pass on an identifiable mutation in these genes to her children based on this test result. Mutations in these genes do not skip generations.      Additional Testing Considerations:  Although Kesha's genetic testing result was negative, other relatives may still carry a gene mutation associated with breast cancer. Genetic counseling is recommended for her sister to discuss genetic testing options. If she, or any other relatives do pursue genetic testing, Kesha is encouraged to contact me  so that we may review the impact of their test results on her. Of note, her history is in the process of getting updated genetic testing.     Summary:  We do not have an explanation for Kesha's family history of cancer. While no genetic changes were identified, Kesha may still be at risk for certain cancers due to family history, environmental factors, or other genetic causes not identified by this test. Because of that, it is important that she continue with cancer screening based on her personal and family history as discussed above.    Genetic testing is rapidly advancing, and new cancer susceptibility genes will most likely be identified in the future. Therefore, I encouraged Kesha to contact me annually or if there are changes in her personal or family history. This may change how we assess her cancer risk, screening, and the testing we would offer.    Plan:  1.  A copy of the test results will be sent to Kesha.  2. She plans to follow-up with her other providers.  3. She should contact me regularly, or sooner if her family history changes.  4. Kesha gave me permission to discuss her genetic testing and other information discussed at our appointment with her sister Ceci.    If Kesha has any further questions, I encouraged her to contact me via [employer].    Time spent on video: 8 minutes.  Channing Milton MS, [facility] [place]  Licensed, Certified Genetic Counselor

## 2024-05-23 NOTE — Clinical Note
"    5/23/2024         RE: Kesha Carter  [address] [address]        Dear Colleague,    Thank you for referring your patient, Kesha Carter, to the [facility]. Please see a copy of my visit note below.    Virtual Visit Details    Type of service:  Video Visit     Originating Location (pt. Location): {video visit patient location:201001::\"Home\"}  {PROVIDER LOCATION On-site should be selected for visits conducted from your clinic location or adjoining [facility] hospital, academic office, or other nearby [facility] building. Off-site should be selected for all other provider locations, including home:701830}  Distant Location (provider location):  {virtual location provider:058725}  Platform used for Video Visit: {Virtual Visit Platforms:205465::\"[employer]\"}    5/23/2024    Referring Provider: Pia Mar MD    Presenting Information:  I spoke to Kesha by video today to discuss her genetic testing results. Her blood was drawn on 5/6/24. The Common Hereditary Cancers panel was ordered from [facility]. This testing was done because of Kesha's family history of breast cancer.    Genetic Testing Result: NEGATIVE  Kesha is negative for mutations in APC, QI, AXIN2, BAP1, BARD1, BMPR1A, BRCA1, BRCA2, BRIP1, CDH1, CDK4, CDKN2A, CHEK2, CTNNA1, DICER1, EPCAM, FH, GREM1, HOXB13, KIT, MBD4, MEN1, MLH1, MSH2, MSH3, MSH6, MUTYH, NF1, NTHL1, PALB2, PDGFRA, PMS2, POLD1, POLE, PTEN, RAD51C, RAD51D, SDHA, SDHB, SDHC, SDHD, SMAD4, SMARCA4, STK11, TP53, TSC1, TSC2, and VHL. No mutations were found in any of the 48 genes analyzed. This test involved sequencing and deletion/duplication analysis of all genes with the exceptions of EPCAM and GREM1 (deletions/duplications only) and SDHA (sequencing only).     A copy of the test report can be found in the Laboratory tab, dated 5/6/24, and named \"LABORATORY MISCELLANEOUS ORDER\". The report is scanned in as a linked " document.    Interpretation:  We discussed several different interpretations of this negative test result.    One explanation may be that there is a different gene or combination of genes and environment that are associated with the cancers in this family.  It is possible that her sister or paternal uncle with breast cancer did have a mutation in one of these genes and she did not inherit it.  There is also a small possibility that there is a mutation in one of these genes, and the testing laboratory could not find it with their current testing methods.       Screening:  Based on this negative test result, it is important for Kesha and her relatives to refer back to the family history for appropriate cancer screening.    Based on her personal and family history, Kesha has a 17.8% lifetime risk of developing breast cancer based on the JUAN FRANCISCO model. Therefore, Kesha does not meet current National Comprehensive Cancer Network (NCCN) guidelines for high risk breast screening, which is offered to women with a 20% lifetime risk or higher. However, it is still important for Kesha to continue with routine breast screening under the care of her physicians. Breast cancer screening is generally recommended to begin approximately 10 years younger than the earliest age of breast cancer diagnosis in the family, or at age 40, whichever comes first. In this family, screening may begin at age 29. Kesha is encouraged to discuss breast screening with her physicians.   Other population cancer screening options, such as those recommended by the American Cancer Society and the National Comprehensive Cancer Network (NCCN), are also appropriate for Kesha and her family. These screening recommendations may change if there are changes to Kesha's personal and/or family history of cancer. Final screening recommendations should be made by each individual's primary care provider.      Inheritance:  We reviewed autosomal  dominant inheritance. We discussed that Kesha did not pass on an identifiable mutation in these genes to her children based on this test result. Mutations in these genes do not skip generations.      Additional Testing Considerations:  Although Kesha's genetic testing result was negative, other relatives may still carry a gene mutation associated with breast cancer. Genetic counseling is recommended for her sister to discuss genetic testing options. If she, or any other relatives do pursue genetic testing, Kesha is encouraged to contact me so that we may review the impact of their test results on her. Of note, her history is in the process of getting updated genetic testing.     Summary:  We do not have an explanation for Kesha's family history of cancer. While no genetic changes were identified, Kesha may still be at risk for certain cancers due to family history, environmental factors, or other genetic causes not identified by this test. Because of that, it is important that she continue with cancer screening based on her personal and family history as discussed above.    Genetic testing is rapidly advancing, and new cancer susceptibility genes will most likely be identified in the future. Therefore, I encouraged Kesha to contact me annually or if there are changes in her personal or family history. This may change how we assess her cancer risk, screening, and the testing we would offer.    Plan:  1.  A copy of the test results will be sent to Kesha.  2. She plans to follow-up with her other providers.  3. She should contact me regularly, or sooner if her family history changes.  4. Kesha gave me permission to discuss her genetic testing and other information discussed at our appointment with her sister Ceci. ***    If Kesha has any further questions, I encouraged her to contact me via [employer].    Time spent on video: *** minutes.    Channing Milton MS, CGC  Licensed, Certified  "Genetic Counselor    ***    5/23/2024    Referring Provider: Pia Mar MD    Presenting Information:  I spoke to Kesha by video today to discuss her genetic testing results. Her blood was drawn on 5/6/24. The Common Hereditary Cancers panel was ordered from [facility]. This testing was done because of Kesha's family history of breast cancer.    Genetic Testing Result: NEGATIVE  Kesha is negative for mutations in APC, QI, AXIN2, BAP1, BARD1, BMPR1A, BRCA1, BRCA2, BRIP1, CDH1, CDK4, CDKN2A, CHEK2, CTNNA1, DICER1, EPCAM, FH, GREM1, HOXB13, KIT, MBD4, MEN1, MLH1, MSH2, MSH3, MSH6, MUTYH, NF1, NTHL1, PALB2, PDGFRA, PMS2, POLD1, POLE, PTEN, RAD51C, RAD51D, SDHA, SDHB, SDHC, SDHD, SMAD4, SMARCA4, STK11, TP53, TSC1, TSC2, and VHL. No mutations were found in any of the 48 genes analyzed. This test involved sequencing and deletion/duplication analysis of all genes with the exceptions of EPCAM and GREM1 (deletions/duplications only) and SDHA (sequencing only).     A copy of the test report can be found in the Laboratory tab, dated 5/6/24, and named \"LABORATORY MISCELLANEOUS ORDER\". The report is scanned in as a linked document.    Interpretation:  We discussed several different interpretations of this negative test result.    One explanation may be that there is a different gene or combination of genes and environment that are associated with the cancers in this family.  It is possible that her sister or paternal uncle with breast cancer did have a mutation in one of these genes and she did not inherit it.  There is also a small possibility that there is a mutation in one of these genes, and the testing laboratory could not find it with their current testing methods.       Screening:  Based on this negative test result, it is important for Kesha and her relatives to refer back to the family history for appropriate cancer screening.    Based on her personal and family history, Kesha has a 17.8% lifetime " risk of developing breast cancer based on the JUAN FRANCISCO model. Therefore, Kesha does not meet current National Comprehensive Cancer Network (NCCN) guidelines for high risk breast screening, which is offered to women with a 20% lifetime risk or higher. However, it is still important for Kesha to continue with routine breast screening under the care of her physicians. Breast cancer screening is generally recommended to begin approximately 10 years younger than the earliest age of breast cancer diagnosis in the family, or at age 40, whichever comes first. In this family, screening may begin at age 29. Kesha is encouraged to discuss breast screening with her physicians.   Other population cancer screening options, such as those recommended by the American Cancer Society and the National Comprehensive Cancer Network (NCCN), are also appropriate for Kesha and her family. These screening recommendations may change if there are changes to Kesha's personal and/or family history of cancer. Final screening recommendations should be made by each individual's primary care provider.      Inheritance:  We reviewed autosomal dominant inheritance. We discussed that Kesha did not pass on an identifiable mutation in these genes to her children based on this test result. Mutations in these genes do not skip generations.      Additional Testing Considerations:  Although Kesha's genetic testing result was negative, other relatives may still carry a gene mutation associated with breast cancer. Genetic counseling is recommended for her sister to discuss genetic testing options. If she, or any other relatives do pursue genetic testing, Kesha is encouraged to contact me so that we may review the impact of their test results on her. Of note, her history is in the process of getting updated genetic testing.     Summary:  We do not have an explanation for Kesha's family history of cancer. While no genetic changes  were identified, Kesha may still be at risk for certain cancers due to family history, environmental factors, or other genetic causes not identified by this test. Because of that, it is important that she continue with cancer screening based on her personal and family history as discussed above.    Genetic testing is rapidly advancing, and new cancer susceptibility genes will most likely be identified in the future. Therefore, I encouraged Kesha to contact me annually or if there are changes in her personal or family history. This may change how we assess her cancer risk, screening, and the testing we would offer.    Plan:  1.  A copy of the test results will be sent to Kesha.  2. She plans to follow-up with her other providers.  3. She should contact me regularly, or sooner if her family history changes.  4. Kesha gave me permission to discuss her genetic testing and other information discussed at our appointment with her sister Ceci. ***    If Kesha has any further questions, I encouraged her to contact me via [employer].    Time spent on video: *** minutes.    Channing Milton MS, [facility] [place]  Licensed, Certified Genetic Counselor    ***    Virtual Visit Details    Type of service:  Video Visit     Originating Location (pt. Location): Home  Distant Location (provider location):  Off-site  Platform used for Video Visit: DamiánWell      Again, thank you for allowing me to participate in the care of your patient.        Sincerely,        Channing Milton GC

## 2024-05-23 NOTE — NURSING NOTE
Is the patient currently in the state of [place]? YES    Visit mode:VIDEO    If the visit is dropped, the patient can be reconnected by: VIDEO VISIT: Send to e-mail at: [email or web address]    Will anyone else be joining the visit? NO  (If patient encounters technical issues they should call [phone])    How would you like to obtain your AVS? MyChart    Are changes needed to the allergy or medication list? N/A    Reason for visit: Sunil BARRIOS

## 2024-05-28 NOTE — TELEPHONE ENCOUNTER
Central Prior Authorization Team   Phone: [phone]    PA Initiation    Medication: Synthroid  Insurance Company: [employer] - Phone [phone] Fax [phone]  Pharmacy Filling the Rx: [facility] - [place], [place] - [address] [place]  Filling Pharmacy Phone: [phone]  Filling Pharmacy Fax:    Start Date: 5/28/2024

## 2024-05-28 NOTE — TELEPHONE ENCOUNTER
Pt calling back about PA being denied pt said that [employer] would like the Doctor to talk to another provider to appeal this. The number is [phone]. Pt feels terrible but this is the only way she can hope to get her Synthroid medication. Pt has IBS with dyes and colors she has to be on this medication.    .Sunni Pruett PSC

## 2024-05-28 NOTE — TELEPHONE ENCOUNTER
PRIOR AUTHORIZATION DENIED    Medication: Synthroid    Denial Date: 5/28/2024    Denial Rational:                  Appeal Information:    If you would like to appeal, please supply P/A team with a letter of medical necessity with clinical reason.

## 2024-05-30 NOTE — TELEPHONE ENCOUNTER
Medication Appeal Initiation    We have initiated an appeal for the requested medication:  Medication: Synthroid  Appeal Start Date:  5/30/2024  Insurance Company:    Comments:       E-Appeal

## 2024-06-03 ENCOUNTER — THERAPY VISIT (OUTPATIENT)
Dept: PHYSICAL THERAPY | Facility: CLINIC | Age: 44
End: 2024-06-03
Payer: COMMERCIAL

## 2024-06-03 DIAGNOSIS — M54.50 ACUTE BILATERAL LOW BACK PAIN WITHOUT SCIATICA: Primary | ICD-10-CM

## 2024-06-03 PROCEDURE — 97110 THERAPEUTIC EXERCISES: CPT | Mod: GP

## 2024-06-03 NOTE — TELEPHONE ENCOUNTER
MEDICATION APPEAL APPROVED    Medication: Synthroid  Authorization Effective Date:  05/16/2024  Authorization Expiration Date:  05/30/2025  Approved Dose/Quantity:    Reference #:     Insurance Company:    Expected CoPay:       CoPay Card Available:      Foundation Assistance Needed:    Which Pharmacy is filling the prescription (Not needed for infusion/clinic administered): [facility] - [place], [place] - [address]

## 2024-08-07 DIAGNOSIS — M54.59 MECHANICAL LOW BACK PAIN: ICD-10-CM

## 2024-08-07 RX ORDER — CYCLOBENZAPRINE HCL 5 MG
TABLET ORAL
Qty: 60 TABLET | Refills: 3 | Status: SHIPPED | OUTPATIENT
Start: 2024-08-07

## 2024-10-07 NOTE — PROGRESS NOTES
06/03/24 0500   Appointment Info   Signing clinician's name / credentials Mignon Bernabe, PT DPT   Total/Authorized Visits 4   Visits Used 3   Medical Diagnosis Acute Bilateral Low back pain without sciaticAcute Bilateral Low back pain without sciatica   PT Tx Diagnosis Acute Bilateral Low back pain without sciaticAcute Bilateral Low back pain without sciatica   Progress Note/Certification   Onset of illness/injury or Date of Surgery 04/13/24   Therapy Frequency 1x EOW   Predicted Duration 8 weeks   PT Goal 1   Goal Identifier LTG 1   Goal Description Pt will be able to run for at least 7 miles at a time without having to take breaks due to discomfort.   Rationale to maximize safety and independence with self cares;to maximize safety and independence with performance of ADLs and functional tasks   Goal Progress 4.5-5 before break due to knee soreness.   Target Date 06/21/24   Subjective Report   Subjective Report lakeisha was able to run 7.5miles this morning. SHe continues to have to take breaks but able to do 4.5-5 miles before a break. She feels some achiness above the knees which triggers to take a fast walking break.   Treatment Interventions (PT)   Interventions Therapeutic Procedure/Exercise;Manual Therapy   Therapeutic Procedure/Exercise   Therapeutic Procedures: strength, endurance, ROM, flexibility minutes (40923) 38   PTRx Ther Proc 1 Seated Piriformis Stretch   PTRx Ther Proc 1 - Details x 30 sec B   PTRx Ther Proc 2  Gluteal Stretch   PTRx Ther Proc 2 - Details x 30 sec B   PTRx Ther Proc 3 Trunk Activation - Reach Down and Across Body - Right   PTRx Ther Proc 3 - Details Verbal review   PTRx Ther Proc 4 Kneeling Hip Flexor Stretch   PTRx Ther Proc 4 - Details x 30 sec B   PTRx Ther Proc 5 Pretzel Stretch   PTRx Ther Proc 5 - Details x 30 sec B   PTRx Ther Proc 6 Supine Abdominal Exercise #4 (Leg Extension)   PTRx Ther Proc 6 - Details x 10 B   PTRx Ther Proc 7 Lateral Stepdown with Neutral Trunk Position  "  PTRx Ther Proc 7 - Details x 10 B 4\"   PTRx Ther Proc 8 Clamshell Feet together   PTRx Ther Proc 8 - Details x 10 B   Skilled Intervention progressed strengthening and review of stretching./   Patient Response/Progress toleraetes well, appropriate fatigue   PTRx Ther Proc 9 Hip Abduction Straight Leg Raise   PTRx Ther Proc 9 - Details x 10 B   PTRx Ther Proc 10 Standing Fire Hydrant   PTRx Ther Proc 10 - Details x 10 B   PTRx Ther Proc 11 Single Leg Standing Deadlift   PTRx Ther Proc 11 - Details x 10 B   PTRx Ther Proc 12 Side Stepping With Theraband   PTRx Ther Proc 12 - Details x 20 ' Mt TB at knees B   Ther Proc 1 Monster Walks   Ther Proc 1 - Details x 20' F/B Mt TB at knees   Manual Therapy   Manual Therapy 1 Pelvic Alignment Assessment: positive R upslip, positive L anterior shift.   Manual Therapy 1 - Details Correction: R LE pulls x 3, L posterior pelvic glides grade 4   Manual Therapy 2 Seated MET for L4 FRSR   Manual Therapy 2 - Details submaximal contraction at end ROM x 3 with OP with exhale at conclusion   Skilled Intervention Improve pelvic alignment and spinal mobility   Patient Response/Progress negative R posterior quadrant screen following. Improved mobility following, painfree   Plan   Home program online eptrx   Plan for next session pelvic alignment. cont mobility and stability ex   Total Session Time   Timed Code Treatment Minutes 38   Total Treatment Time (sum of timed and untimed services) 38         DISCHARGE  Reason for Discharge: Patient has failed to schedule further appointments.    Equipment Issued: na    Discharge Plan: Patient to continue home program.    Referring Provider:  Maurice Rodrigez    "

## 2024-10-13 SDOH — HEALTH STABILITY: PHYSICAL HEALTH: ON AVERAGE, HOW MANY MINUTES DO YOU ENGAGE IN EXERCISE AT THIS LEVEL?: 60 MIN

## 2024-10-13 SDOH — HEALTH STABILITY: PHYSICAL HEALTH: ON AVERAGE, HOW MANY DAYS PER WEEK DO YOU ENGAGE IN MODERATE TO STRENUOUS EXERCISE (LIKE A BRISK WALK)?: 6 DAYS

## 2024-10-13 ASSESSMENT — SOCIAL DETERMINANTS OF HEALTH (SDOH): HOW OFTEN DO YOU GET TOGETHER WITH FRIENDS OR RELATIVES?: ONCE A WEEK

## 2024-10-16 ENCOUNTER — OFFICE VISIT (OUTPATIENT)
Dept: FAMILY MEDICINE | Facility: CLINIC | Age: 44
End: 2024-10-16
Attending: FAMILY MEDICINE
Payer: COMMERCIAL

## 2024-10-16 VITALS
HEART RATE: 58 BPM | SYSTOLIC BLOOD PRESSURE: 90 MMHG | WEIGHT: 134 LBS | HEIGHT: 66 IN | DIASTOLIC BLOOD PRESSURE: 64 MMHG | OXYGEN SATURATION: 95 % | BODY MASS INDEX: 21.53 KG/M2 | TEMPERATURE: 98.2 F

## 2024-10-16 DIAGNOSIS — E03.2 HYPOTHYROIDISM DUE TO MEDICATION: ICD-10-CM

## 2024-10-16 DIAGNOSIS — Z00.00 ROUTINE GENERAL MEDICAL EXAMINATION AT A HEALTH CARE FACILITY: Primary | ICD-10-CM

## 2024-10-16 DIAGNOSIS — Z12.31 ENCOUNTER FOR SCREENING MAMMOGRAM FOR MALIGNANT NEOPLASM OF BREAST: ICD-10-CM

## 2024-10-16 DIAGNOSIS — Z80.3 FAMILY HISTORY OF BREAST CANCER IN SISTER: ICD-10-CM

## 2024-10-16 DIAGNOSIS — F31.74 BIPOLAR DISORDER, IN FULL REMISSION, MOST RECENT EPISODE MANIC (H): ICD-10-CM

## 2024-10-16 DIAGNOSIS — Z12.4 CERVICAL CANCER SCREENING: ICD-10-CM

## 2024-10-16 DIAGNOSIS — H04.123 DRY EYES: ICD-10-CM

## 2024-10-16 DIAGNOSIS — L70.0 ACNE VULGARIS: ICD-10-CM

## 2024-10-16 DIAGNOSIS — R79.89 ELEVATED SERUM CREATININE: ICD-10-CM

## 2024-10-16 LAB
ALBUMIN SERPL BCG-MCNC: 4.4 G/DL (ref 3.5–5.2)
ALP SERPL-CCNC: 49 U/L (ref 40–150)
ALT SERPL W P-5'-P-CCNC: 13 U/L (ref 0–50)
ANION GAP SERPL CALCULATED.3IONS-SCNC: 14 MMOL/L (ref 7–15)
AST SERPL W P-5'-P-CCNC: 26 U/L (ref 0–45)
BILIRUB SERPL-MCNC: 0.5 MG/DL
BUN SERPL-MCNC: 25.5 MG/DL (ref 6–20)
CALCIUM SERPL-MCNC: 9.6 MG/DL (ref 8.8–10.4)
CHLORIDE SERPL-SCNC: 106 MMOL/L (ref 98–107)
CREAT SERPL-MCNC: 1.04 MG/DL (ref 0.51–0.95)
CRP SERPL-MCNC: <3 MG/L
EGFRCR SERPLBLD CKD-EPI 2021: 67 ML/MIN/1.73M2
ERYTHROCYTE [SEDIMENTATION RATE] IN BLOOD BY WESTERGREN METHOD: 6 MM/HR (ref 0–20)
GLUCOSE SERPL-MCNC: 82 MG/DL (ref 70–99)
HCO3 SERPL-SCNC: 18 MMOL/L (ref 22–29)
LITHIUM SERPL-SCNC: 0.67 MMOL/L (ref 0.6–1.2)
POTASSIUM SERPL-SCNC: 4.1 MMOL/L (ref 3.4–5.3)
PROT SERPL-MCNC: 7.3 G/DL (ref 6.4–8.3)
SODIUM SERPL-SCNC: 138 MMOL/L (ref 135–145)
TSH SERPL DL<=0.005 MIU/L-ACNC: 2.76 UIU/ML (ref 0.3–4.2)

## 2024-10-16 PROCEDURE — 84443 ASSAY THYROID STIM HORMONE: CPT | Performed by: FAMILY MEDICINE

## 2024-10-16 PROCEDURE — 36415 COLL VENOUS BLD VENIPUNCTURE: CPT | Performed by: FAMILY MEDICINE

## 2024-10-16 PROCEDURE — G0145 SCR C/V CYTO,THINLAYER,RESCR: HCPCS | Performed by: FAMILY MEDICINE

## 2024-10-16 PROCEDURE — 86038 ANTINUCLEAR ANTIBODIES: CPT | Performed by: FAMILY MEDICINE

## 2024-10-16 PROCEDURE — 80053 COMPREHEN METABOLIC PANEL: CPT | Performed by: FAMILY MEDICINE

## 2024-10-16 PROCEDURE — 90656 IIV3 VACC NO PRSV 0.5 ML IM: CPT | Performed by: FAMILY MEDICINE

## 2024-10-16 PROCEDURE — 99214 OFFICE O/P EST MOD 30 MIN: CPT | Mod: 25 | Performed by: FAMILY MEDICINE

## 2024-10-16 PROCEDURE — 87624 HPV HI-RISK TYP POOLED RSLT: CPT | Performed by: FAMILY MEDICINE

## 2024-10-16 PROCEDURE — 90471 IMMUNIZATION ADMIN: CPT | Performed by: FAMILY MEDICINE

## 2024-10-16 PROCEDURE — 85652 RBC SED RATE AUTOMATED: CPT | Performed by: FAMILY MEDICINE

## 2024-10-16 PROCEDURE — 86039 ANTINUCLEAR ANTIBODIES (ANA): CPT | Performed by: FAMILY MEDICINE

## 2024-10-16 PROCEDURE — 80178 ASSAY OF LITHIUM: CPT | Performed by: FAMILY MEDICINE

## 2024-10-16 PROCEDURE — 86140 C-REACTIVE PROTEIN: CPT | Performed by: FAMILY MEDICINE

## 2024-10-16 PROCEDURE — 86235 NUCLEAR ANTIGEN ANTIBODY: CPT | Performed by: FAMILY MEDICINE

## 2024-10-16 PROCEDURE — 99396 PREV VISIT EST AGE 40-64: CPT | Mod: 25 | Performed by: FAMILY MEDICINE

## 2024-10-16 RX ORDER — SPIRONOLACTONE 50 MG/1
50 TABLET, FILM COATED ORAL DAILY
Qty: 90 TABLET | Refills: 4 | Status: SHIPPED | OUTPATIENT
Start: 2024-10-16

## 2024-10-16 NOTE — PATIENT INSTRUCTIONS
Patient Education   Preventive Care Advice   This is general advice given by our system to help you stay healthy. However, your care team may have specific advice just for you. Please talk to your care team about your preventive care needs.  Nutrition  Eat 5 or more servings of fruits and vegetables each day.  Try wheat bread, brown rice and whole grain pasta (instead of white bread, rice, and pasta).  Get enough calcium and vitamin D. Check the label on foods and aim for 100% of the RDA (recommended daily allowance).  Lifestyle  Exercise at least 150 minutes each week  (30 minutes a day, 5 days a week).  Do muscle strengthening activities 2 days a week. These help control your weight and prevent disease.  No smoking.  Wear sunscreen to prevent skin cancer.  Have a dental exam and cleaning every 6 months.  Yearly exams  See your health care team every year to talk about:  Any changes in your health.  Any medicines your care team has prescribed.  Preventive care, family planning, and ways to prevent chronic diseases.  Shots (vaccines)   HPV shots (up to age 26), if you've never had them before.  Hepatitis B shots (up to age 59), if you've never had them before.  COVID-19 shot: Get this shot when it's due.  Flu shot: Get a flu shot every year.  Tetanus shot: Get a tetanus shot every 10 years.  Pneumococcal, hepatitis A, and RSV shots: Ask your care team if you need these based on your risk.  Shingles shot (for age 50 and up)  General health tests  Diabetes screening:  Starting at age 35, Get screened for diabetes at least every 3 years.  If you are younger than age 35, ask your care team if you should be screened for diabetes.  Cholesterol test: At age 39, start having a cholesterol test every 5 years, or more often if advised.  Bone density scan (DEXA): At age 50, ask your care team if you should have this scan for osteoporosis (brittle bones).  Hepatitis C: Get tested at least once in your life.  STIs (sexually  transmitted infections)  Before age 24: Ask your care team if you should be screened for STIs.  After age 24: Get screened for STIs if you're at risk. You are at risk for STIs (including HIV) if:  You are sexually active with more than one person.  You don't use condoms every time.  You or a partner was diagnosed with a sexually transmitted infection.  If you are at risk for HIV, ask about PrEP medicine to prevent HIV.  Get tested for HIV at least once in your life, whether you are at risk for HIV or not.  Cancer screening tests  Cervical cancer screening: If you have a cervix, begin getting regular cervical cancer screening tests starting at age 21.  Breast cancer scan (mammogram): If you've ever had breasts, begin having regular mammograms starting at age 40. This is a scan to check for breast cancer.  Colon cancer screening: It is important to start screening for colon cancer at age 45.  Have a colonoscopy test every 10 years (or more often if you're at risk) Or, ask your provider about stool tests like a FIT test every year or Cologuard test every 3 years.  To learn more about your testing options, visit:   .  For help making a decision, visit:   https://bit.ly/ie91740.  Prostate cancer screening test: If you have a prostate, ask your care team if a prostate cancer screening test (PSA) at age 55 is right for you.  Lung cancer screening: If you are a current or former smoker ages 50 to 80, ask your care team if ongoing lung cancer screenings are right for you.  For informational purposes only. Not to replace the advice of your health care provider. Copyright   2023 [employer]. All rights reserved. Clinically reviewed by the [facility]. [employer] 929645 - REV 01/24.

## 2024-10-16 NOTE — PROGRESS NOTES
Preventive Care Visit  [facility] JENA Mar MD, Family Medicine  Oct 16, 2024      Assessment & Plan     Routine general medical examination at a health care facility      Cervical cancer screening  Done   - HPV and Gynecologic Cytology Panel - Recommended Age 30 - 65 Years    Hypothyroidism due to medication  Will check   - TSH WITH FREE T4 REFLEX; Future    Acne vulgaris  Doing well   - spironolactone (ALDACTONE) 50 MG tablet; Take 1 tablet (50 mg) by mouth daily.  - Comprehensive metabolic panel (BMP + Alb, Alk Phos, ALT, AST, Total. Bili, TP); Future    Family history of breast cancer in sister  Sister dxd   - MA Screen Bilateral w/Naeem; Future    Bipolar disorder, in full remission, most recent episode manic (H)  Managed by psych   - Lithium level; Future    Encounter for screening mammogram for malignant neoplasm of breast  Sister with diagnosed breast cancer January of this year he is already undergone genetic testing there are no genetic markers her sisters breast cancer was ER/CT negative  - MA Screen Bilateral w/Naeem; Future    Dry eyes  This is increasingly worsening we will check for Sjogren's  - ESR: Erythrocyte sedimentation rate; Future  - CRP, inflammation; Future  - SSA Ro CORTES Antibody IgG; Future  - Anti Nuclear Leonarda IgG by IFA with Reflex; Future  - Comprehensive metabolic panel (BMP + Alb, Alk Phos, ALT, AST, Total. Bili, TP); Future    Patient has been advised of split billing requirements and indicates understanding: Yes        Counseling  Appropriate preventive services were addressed with this patient via screening, questionnaire, or discussion as appropriate for fall prevention, nutrition, physical activity, Tobacco-use cessation, social engagement, weight loss and cognition.  Checklist reviewing preventive services available has been given to the patient.  Reviewed patient's diet, addressing concerns and/or questions.   She is at risk for psychosocial distress and  has been provided with information to reduce risk.       FUTURE APPOINTMENTS:       - Follow-up for annual visit or as needed    Subjective   Kendra is a 43 year old, presenting for the following:  Physical        10/16/2024     8:27 AM   Additional Questions   Roomed by Paulette COFFMAN CMA        Health Care Directive  Patient does not have a Health Care Directive or Living Will: Discussed advance care planning with patient; information given to patient to review.    HPI    *Discuss birth control  We did discuss this and she is going to stay on this for now as it was so helpful with her headaches     Check tsh tioday   Having lots of stress due to her sister's diagnosis.  She also has 2 teenage daughters who are driving her insane so she is under a lot of stress she has been discussing with her  him taking on a little bit more of that load.  She also has absolutely 0 libido but with the amount of stress she is under completely explainable.  She is currently not in menopause as she is taking birth control pills we discussed possibly going off of them for 2 to 3 months in a year or so but and then checking levels as she has had an ablation but the headache issue is        10/13/2024   General Health   How would you rate your overall physical health? Excellent   Feel stress (tense, anxious, or unable to sleep) Only a little      (!) STRESS CONCERN      10/13/2024   Nutrition   Three or more servings of calcium each day? Yes   Diet: Gluten-free/reduced   How many servings of fruit and vegetables per day? 4 or more   How many sweetened beverages each day? 0-1            10/13/2024   Exercise   Days per week of moderate/strenous exercise 6 days   Average minutes spent exercising at this level 60 min            10/13/2024   Social Factors   Frequency of gathering with friends or relatives Once a week   Worry food won't last until get money to buy more No    No   Food not last or not have enough money for food? No    No    Do you have housing? (Housing is defined as stable permanent housing and does not include staying ouside in a car, in a tent, in an abandoned building, in an overnight shelter, or couch-surfing.) Yes    Yes   Are you worried about losing your housing? No    No   Lack of transportation? No    No   Unable to get utilities (heat,electricity)? No    No       Multiple values from one day are sorted in reverse-chronological order         10/13/2024   Dental   Dentist two times every year? Yes            10/13/2024   TB Screening   Were you born outside of the [place]? No              Today's PHQ-2 Score:       1/15/2024     2:36 PM   PHQ-2 (  [employer])   Q1: Little interest or pleasure in doing things 0   Q2: Feeling down, depressed or hopeless 0   PHQ-2 Score 0   Q1: Little interest or pleasure in doing things Not at all   Q2: Feeling down, depressed or hopeless Not at all   PHQ-2 Score 0         10/13/2024   Substance Use   Alcohol more than 3/day or more than 7/wk No   Do you use any other substances recreationally? No        Social History     Tobacco Use    Smoking status: Former     Current packs/day: 0.00     Types: Cigarettes     Quit date: 2016     Years since quittin.1    Smokeless tobacco: Never   Vaping Use    Vaping status: Never Used   Substance Use Topics    Alcohol use: Not Currently    Drug use: No           10/11/2022   LAST FHS-7 RESULTS   1st degree relative breast or ovarian cancer No   Any relative bilateral breast cancer No   Any male have breast cancer Yes   Any ONE woman have BOTH breast AND ovarian cancer No   Any woman with breast cancer before 50yrs No   2 or more relatives with breast AND/OR ovarian cancer No   2 or more relatives with breast AND/OR bowel cancer No           Mammogram Screening - Annual screen due to genetic mutation or genetic mutation in 1st degree family member        10/13/2024   STI Screening   New sexual partner(s) since last STI/HIV test? No        History of  "abnormal Pap smear: No - age 30- 64 PAP with HPV every 5 years recommended        Latest Ref Rng & Units 7/19/2019     4:06 PM 7/19/2019     3:44 PM 8/26/2016     8:48 AM   PAP / HPV   PAP (Historical)  NIL      HPV 16 DNA NEG^Negative  Negative  Negative    HPV 18 DNA NEG^Negative  Negative  Negative    Other HR HPV NEG^Negative  Negative  Negative      ASCVD Risk   The 10-year ASCVD risk score (Willian LAUREANO, et al., 2019) is: 0.2%    Values used to calculate the score:      Age: 43 years      Sex: Female      Is Non- : No      Diabetic: No      Tobacco smoker: No      Systolic Blood Pressure: 108 mmHg      Is BP treated: No      HDL Cholesterol: 89 mg/dL      Total Cholesterol: 177 mg/dL         Reviewed and updated as needed this visit by Provider                    Past Medical History:   Diagnosis Date    Thyroid disease 2013         Review of Systems  Constitutional, HEENT, cardiovascular, pulmonary, gi and gu systems are negative, except as otherwise noted.     Objective    Exam  There were no vitals taken for this visit.   Estimated body mass index is 21.63 kg/m  as calculated from the following:    Height as of 4/17/24: 1.67 m (5' 5.75\").    Weight as of 4/17/24: 60.3 kg (133 lb).    Physical Exam  GENERAL: alert and no distress  EYES: Eyes grossly normal to inspection, PERRL and conjunctivae and sclerae normal  HENT: ear canals and TM's normal, nose and mouth without ulcers or lesions  NECK: no adenopathy, no asymmetry, masses, or scars  RESP: lungs clear to auscultation - no rales, rhonchi or wheezes  BREAST: normal without masses, tenderness or nipple discharge, no palpable axillary masses or adenopathy, and implant bilaterally  CV: regular rate and rhythm, normal S1 S2, no S3 or S4, no murmur, click or rub, no peripheral edema  ABDOMEN: soft, nontender, no hepatosplenomegaly, no masses and bowel sounds normal  MS: no gross musculoskeletal defects noted, no edema  SKIN: no " suspicious lesions or rashes  NEURO: Normal strength and tone, mentation intact and speech normal  PSYCH: mentation appears normal, affect normal/bright        Signed Electronically by: Pia Mar MD

## 2024-10-17 LAB
ENA SS-A AB SER IA-ACNC: <0.5 U/ML
ENA SS-A AB SER IA-ACNC: NEGATIVE
HPV HR 12 DNA CVX QL NAA+PROBE: NEGATIVE
HPV16 DNA CVX QL NAA+PROBE: NEGATIVE
HPV18 DNA CVX QL NAA+PROBE: NEGATIVE
HUMAN PAPILLOMA VIRUS FINAL DIAGNOSIS: NORMAL

## 2024-10-18 LAB
ANA PAT SER IF-IMP: ABNORMAL
ANA SER QL IF: POSITIVE
ANA TITR SER IF: ABNORMAL {TITER}

## 2024-10-22 LAB
BKR AP ASSOCIATED HPV REPORT: NORMAL
BKR LAB AP GYN ADEQUACY: NORMAL
BKR LAB AP GYN INTERPRETATION: NORMAL
BKR LAB AP PREVIOUS ABNORMAL: NORMAL
PATH REPORT.COMMENTS IMP SPEC: NORMAL
PATH REPORT.COMMENTS IMP SPEC: NORMAL
PATH REPORT.RELEVANT HX SPEC: NORMAL

## 2024-10-23 ENCOUNTER — TELEPHONE (OUTPATIENT)
Dept: FAMILY MEDICINE | Facility: CLINIC | Age: 44
End: 2024-10-23
Payer: COMMERCIAL

## 2024-10-23 DIAGNOSIS — H04.123 DRY EYES: Primary | ICD-10-CM

## 2024-10-23 NOTE — TELEPHONE ENCOUNTER
Please print out last 2 visits for them along with the most recent labs. I have signed the referral Pia Mar M.D.

## 2024-10-23 NOTE — TELEPHONE ENCOUNTER
Kendra is calling to follow up from her recent office visit with Dr Mar. Appointments with Rheumatology with [facility] have long wait times. She can be seen at [facility] in [place] Fax [phone].  They need to see her series of lab wok and notes from the last 2 office visits. They can see her in the next week or 2 but  need a referral and documentation before setting up the appt. Her ins covers this. Transferred to Records release department to secure proper consents. Referral pended and sent to PCP for signature. Care Team, please fax the referral if approved.   Bekah KIM RN

## 2024-10-30 ENCOUNTER — TRANSFERRED RECORDS (OUTPATIENT)
Dept: HEALTH INFORMATION MANAGEMENT | Facility: CLINIC | Age: 44
End: 2024-10-30
Payer: COMMERCIAL

## 2024-11-04 ENCOUNTER — LAB (OUTPATIENT)
Dept: LAB | Facility: CLINIC | Age: 44
End: 2024-11-04
Payer: COMMERCIAL

## 2024-11-04 DIAGNOSIS — R79.89 ELEVATED SERUM CREATININE: ICD-10-CM

## 2024-11-04 LAB
ANION GAP SERPL CALCULATED.3IONS-SCNC: 10 MMOL/L (ref 7–15)
BUN SERPL-MCNC: 22 MG/DL (ref 6–20)
CALCIUM SERPL-MCNC: 9.1 MG/DL (ref 8.8–10.4)
CHLORIDE SERPL-SCNC: 107 MMOL/L (ref 98–107)
CREAT SERPL-MCNC: 1.23 MG/DL (ref 0.51–0.95)
EGFRCR SERPLBLD CKD-EPI 2021: 55 ML/MIN/1.73M2
GLUCOSE SERPL-MCNC: 90 MG/DL (ref 70–99)
HCO3 SERPL-SCNC: 21 MMOL/L (ref 22–29)
POTASSIUM SERPL-SCNC: 4 MMOL/L (ref 3.4–5.3)
SODIUM SERPL-SCNC: 138 MMOL/L (ref 135–145)

## 2024-11-04 PROCEDURE — 36415 COLL VENOUS BLD VENIPUNCTURE: CPT

## 2024-11-04 PROCEDURE — 80048 BASIC METABOLIC PNL TOTAL CA: CPT

## 2024-11-05 ENCOUNTER — MYC MEDICAL ADVICE (OUTPATIENT)
Dept: FAMILY MEDICINE | Facility: CLINIC | Age: 44
End: 2024-11-05
Payer: COMMERCIAL

## 2024-11-05 DIAGNOSIS — Z79.899 LITHIUM USE: ICD-10-CM

## 2024-11-05 DIAGNOSIS — R94.4 ABNORMAL RENAL FUNCTION TEST: Primary | ICD-10-CM

## 2024-11-19 ENCOUNTER — LAB (OUTPATIENT)
Dept: LAB | Facility: CLINIC | Age: 44
End: 2024-11-19
Payer: COMMERCIAL

## 2024-11-19 DIAGNOSIS — R94.4 ABNORMAL RENAL FUNCTION TEST: ICD-10-CM

## 2024-11-19 LAB
ALBUMIN SERPL BCG-MCNC: 4.3 G/DL (ref 3.5–5.2)
ANION GAP SERPL CALCULATED.3IONS-SCNC: 10 MMOL/L (ref 7–15)
BUN SERPL-MCNC: 30.7 MG/DL (ref 6–20)
CALCIUM SERPL-MCNC: 9.6 MG/DL (ref 8.8–10.4)
CHLORIDE SERPL-SCNC: 108 MMOL/L (ref 98–107)
CREAT SERPL-MCNC: 1.16 MG/DL (ref 0.51–0.95)
EGFRCR SERPLBLD CKD-EPI 2021: 59 ML/MIN/1.73M2
GLUCOSE SERPL-MCNC: 86 MG/DL (ref 70–99)
HCO3 SERPL-SCNC: 22 MMOL/L (ref 22–29)
PHOSPHATE SERPL-MCNC: 2.4 MG/DL (ref 2.5–4.5)
POTASSIUM SERPL-SCNC: 4.4 MMOL/L (ref 3.4–5.3)
SODIUM SERPL-SCNC: 140 MMOL/L (ref 135–145)

## 2024-11-19 PROCEDURE — 80069 RENAL FUNCTION PANEL: CPT

## 2024-11-19 PROCEDURE — 36415 COLL VENOUS BLD VENIPUNCTURE: CPT

## 2024-11-21 DIAGNOSIS — R11.0 NAUSEA: ICD-10-CM

## 2024-11-21 DIAGNOSIS — Z87.898 HX OF MOTION SICKNESS: ICD-10-CM

## 2024-11-25 ENCOUNTER — HOSPITAL ENCOUNTER (OUTPATIENT)
Dept: MAMMOGRAPHY | Facility: CLINIC | Age: 44
Discharge: HOME | End: 2024-11-25
Attending: FAMILY MEDICINE | Admitting: FAMILY MEDICINE
Payer: COMMERCIAL

## 2024-11-25 DIAGNOSIS — Z12.31 ENCOUNTER FOR SCREENING MAMMOGRAM FOR MALIGNANT NEOPLASM OF BREAST: ICD-10-CM

## 2024-11-25 DIAGNOSIS — E03.4 HYPOTHYROIDISM DUE TO ACQUIRED ATROPHY OF THYROID: ICD-10-CM

## 2024-11-25 DIAGNOSIS — Z80.3 FAMILY HISTORY OF BREAST CANCER IN SISTER: ICD-10-CM

## 2024-11-25 PROCEDURE — 77067 SCR MAMMO BI INCL CAD: CPT

## 2024-11-25 RX ORDER — TOPIRAMATE 100 MG/1
100 TABLET, FILM COATED ORAL DAILY
Qty: 90 TABLET | Refills: 3 | Status: SHIPPED | OUTPATIENT
Start: 2024-11-25

## 2024-11-25 RX ORDER — LEVOTHYROXINE SODIUM 112 UG/1
TABLET ORAL
Qty: 90 TABLET | Refills: 3 | Status: SHIPPED | OUTPATIENT
Start: 2024-11-25

## 2025-01-15 DIAGNOSIS — G43.839 MENSTRUAL MIGRAINE, INTRACTABLE, WITHOUT STATUS MIGRAINOSUS: ICD-10-CM

## 2025-01-15 DIAGNOSIS — R45.4 IRRITABILITY: ICD-10-CM

## 2025-01-15 DIAGNOSIS — L70.0 ACNE VULGARIS: ICD-10-CM

## 2025-01-16 RX ORDER — NORGESTIMATE AND ETHINYL ESTRADIOL 0.25-0.035
KIT ORAL
Qty: 112 TABLET | Refills: 1 | Status: SHIPPED | OUTPATIENT
Start: 2025-01-16

## 2025-01-31 ENCOUNTER — VIRTUAL VISIT (OUTPATIENT)
Dept: FAMILY MEDICINE | Facility: CLINIC | Age: 45
End: 2025-01-31
Payer: COMMERCIAL

## 2025-01-31 ENCOUNTER — ANCILLARY PROCEDURE (OUTPATIENT)
Dept: GENERAL RADIOLOGY | Facility: CLINIC | Age: 45
End: 2025-01-31
Attending: FAMILY MEDICINE
Payer: COMMERCIAL

## 2025-01-31 DIAGNOSIS — R05.1 ACUTE COUGH: ICD-10-CM

## 2025-01-31 DIAGNOSIS — J10.1 INFLUENZA A: Primary | ICD-10-CM

## 2025-01-31 PROCEDURE — 71046 X-RAY EXAM CHEST 2 VIEWS: CPT | Mod: TC | Performed by: RADIOLOGY

## 2025-01-31 PROCEDURE — 98005 SYNCH AUDIO-VIDEO EST LOW 20: CPT | Performed by: FAMILY MEDICINE

## 2025-01-31 RX ORDER — HYDROXYCHLOROQUINE SULFATE 200 MG/1
1 TABLET, FILM COATED ORAL DAILY
COMMUNITY
Start: 2025-01-27

## 2025-01-31 RX ORDER — CODEINE PHOSPHATE AND GUAIFENESIN 10; 100 MG/5ML; MG/5ML
1-2 SOLUTION ORAL EVERY 4 HOURS PRN
Qty: 118 ML | Refills: 0 | Status: SHIPPED | OUTPATIENT
Start: 2025-01-31

## 2025-01-31 RX ORDER — ALBUTEROL SULFATE 90 UG/1
2 INHALANT RESPIRATORY (INHALATION) EVERY 6 HOURS PRN
Qty: 8 G | Refills: 0 | Status: SHIPPED | OUTPATIENT
Start: 2025-01-31

## 2025-01-31 ASSESSMENT — ENCOUNTER SYMPTOMS: FEVER: 1

## 2025-01-31 NOTE — PROGRESS NOTES
Kendra is a 44 year old who is being evaluated via a billable video visit.    How would you like to obtain your AVS? MyChart  If the video visit is dropped, the invitation should be resent by: Text to cell phone: [phone]  Will anyone else be joining your video visit? No      Assessment & Plan     (J10.1) Influenza A  (primary encounter diagnosis)  Comment: discussed diagnosis. Cxr looks ok.pertussis pending. Albterol given at her request and cough syrup with codein - Discussed risks/benefits/side effects with the patient. All questions answered. The patient indicates understanding of these issues and agrees with the plan.   Plan: Influenza A & B Antigen - Clinic Collect, B.         pertussis/parapertussis PCR-NP, XR Chest 2         Views, guaiFENesin-codeine (ROBITUSSIN AC)         100-10 MG/5ML solution, albuterol (PROAIR         HFA/PROVENTIL HFA/VENTOLIN HFA) 108 (90 Base)         MCG/ACT inhaler              Subjective   Kendra is a 44 year old, presenting for the following health issues:  Fever        1/31/2025     7:36 AM   Additional Questions   Roomed by RADHA Meza   Accompanied by self     Fever  Associated symptoms include a fever.        Acute Illness  Acute illness concerns: Took home & covid testing = negative    Onset/Duration: Symptoms began Monday  Symptoms:  Fever: YES- comes and goes without treating with tylenol.  No fever yet this morning.  High of 101  Chills/Sweats: YES  Headache (location?): YES  Sinus Pressure: YES  Conjunctivitis:  No  Ear Pain: no  Rhinorrhea: YES  Congestion: YES  Sore Throat: YES- started this morning   Cough: YES - and heaviness in her chest - rattles - coughing to near gag   Wheeze: YES- when she is coughing  Decreased Appetite: No  Nausea: YES  Vomiting: No  Diarrhea: YES- loose stools   Dysuria/Freq.: No- decrease in urination   Dysuria or Hematuria: No  Fatigue/Achiness: YES- body aches and sleeping a lot   Sick/Strep Exposure: No  Therapies tried and outcome:  "increased fluid intake, tylenol     Covid and flu a/b testing kit - second day - negative     Would like albuterol to help with \"opening airway\" - it helped when she had a cold last year       Review of Systems  Constitutional, neuro, ENT, endocrine, pulmonary, cardiac, gastrointestinal, genitourinary, musculoskeletal, integument and psychiatric systems are negative, except as otherwise noted.      Objective         Vitals:  No vitals were obtained today due to virtual visit.    Physical Exam   GENERAL: alert and no distress  EYES: Eyes grossly normal to inspection.  No discharge or erythema, or obvious scleral/conjunctival abnormalities.  RESP: No audible wheeze, cough, or visible cyanosis.    SKIN: Visible skin clear. No significant rash, abnormal pigmentation or lesions.  NEURO: Cranial nerves grossly intact.  Mentation and speech appropriate for age.  PSYCH: Appropriate affect, tone, and pace of words    Results for orders placed or performed in visit on 01/31/25   Influenza A & B Antigen - Clinic Collect     Status: Abnormal    Specimen: Nose; Swab   Result Value Ref Range    Influenza A antigen Positive (A) Negative    Influenza B antigen Negative Negative    Narrative    Test results must be correlated with clinical data. If necessary, results should be confirmed by a molecular assay or viral culture.   Results for orders placed or performed in visit on 01/31/25   XR Chest 2 Views     Status: None    Narrative    EXAM: XR CHEST 2 VIEWS  LOCATION: [facility]  DATE/TIME: 1/31/2025 9:12 AM CST    INDICATION: Acute cough  COMPARISON: Chest x-ray on 12/19/2023      Impression    IMPRESSION: PA and lateral views of the chest were obtained. Cardiomediastinal silhouette is within normal limits. No suspicious focal pulmonary opacities. No significant pleural effusion or pneumothorax.               Video-Visit Details    Type of service:  Video Visit   Originating Location (pt. Location): " Home    Distant Location (provider location):  On-site  Platform used for Video Visit: Katlyn  Signed Electronically by: Funmi Sanon MD

## 2025-04-20 DIAGNOSIS — M54.59 MECHANICAL LOW BACK PAIN: ICD-10-CM

## 2025-04-20 RX ORDER — CYCLOBENZAPRINE HCL 5 MG
TABLET ORAL
Qty: 60 TABLET | Refills: 3 | Status: SHIPPED | OUTPATIENT
Start: 2025-04-20

## 2025-06-17 DIAGNOSIS — L70.0 ACNE VULGARIS: ICD-10-CM

## 2025-06-17 DIAGNOSIS — G43.839 MENSTRUAL MIGRAINE, INTRACTABLE, WITHOUT STATUS MIGRAINOSUS: ICD-10-CM

## 2025-06-17 DIAGNOSIS — R45.4 IRRITABILITY: ICD-10-CM

## 2025-06-17 RX ORDER — NORGESTIMATE AND ETHINYL ESTRADIOL 0.25-0.035
KIT ORAL
Qty: 112 TABLET | Refills: 1 | Status: SHIPPED | OUTPATIENT
Start: 2025-06-17

## 2025-06-18 ENCOUNTER — TELEPHONE (OUTPATIENT)
Dept: FAMILY MEDICINE | Facility: CLINIC | Age: 45
End: 2025-06-18
Payer: COMMERCIAL

## 2025-06-18 NOTE — TELEPHONE ENCOUNTER
Serafin from [facility] pharmacy calling to request prior authorization for Synthroid 112 mcg brand name    Bekah Young RN on 6/18/2025 at 2:34 PM

## 2025-06-20 ENCOUNTER — TELEPHONE (OUTPATIENT)
Dept: FAMILY MEDICINE | Facility: CLINIC | Age: 45
End: 2025-06-20

## 2025-06-20 DIAGNOSIS — E03.4 HYPOTHYROIDISM DUE TO ACQUIRED ATROPHY OF THYROID: Primary | ICD-10-CM

## 2025-06-20 RX ORDER — LEVOTHYROXINE SODIUM 112 MCG
TABLET ORAL
Qty: 90 TABLET | Refills: 3 | Status: SHIPPED | OUTPATIENT
Start: 2025-06-20 | End: 2025-06-24

## 2025-06-20 NOTE — TELEPHONE ENCOUNTER
Sent this back through with an explanation of why she needs to dispense as written.  She would likely need another appeal please let me know what I need to do. Pia Mar M.D.

## 2025-06-20 NOTE — TELEPHONE ENCOUNTER
Dr Mar, The patient has called and wants to do  a appeal to get the Synthroid brand of levothyroxine.  PA was denied on 6/20. See notes from PA on 6/20.  She says the generic gives her very bad diarrhea.  Pharm is [facility].

## 2025-06-20 NOTE — TELEPHONE ENCOUNTER
PRIOR AUTHORIZATION DENIED    ORIGINAL APPEAL LETTER WAS SUBMITTED WITH REQUEST TO SHOW MEDICAL NECESSITY    Medication: SYNTHROID 112 MCG PO TABS  Insurance Company: [employer] Non-Specialty PA's - Phone [phone] Fax [phone]  Denial Date: 6/20/2025  Denial Reason(s): BRAND MUST BE REQUESTED DUE TO FORMULATION DIFFERENCE IN INACTIVE INGREDIENTS      Appeal Information: IF THE PROVIDER WOULD LIKE TO APPEAL THIS DECISION PLEASE PROVIDE THE PA TEAM WITH A LETTER OF MEDICAL NECESSITY      Patient Notified: NO

## 2025-06-20 NOTE — TELEPHONE ENCOUNTER
Retail Pharmacy Prior Authorization Team   Phone: [phone]    PA Initiation    Medication: SYNTHROID 112 MCG PO TABS  Insurance Company: [employer] Non-Specialty PA's - Phone [phone] Fax [phone]  Pharmacy Filling the Rx: [facility] - [place] [address]  Filling Pharmacy Phone: [phone]  Filling Pharmacy Fax:    Start Date: 6/20/2025    CAIO PASTRANA (Olivia: [id number])

## 2025-06-20 NOTE — LETTER
[facility]  [address] [facility] [address]  [phone]        July 1, 2025    Regarding:  Kesha Carter  [address] [place] [phone]              To Whom It May Concern;  Kendra My patient for at least the last 10 years.  She has tried several different varieties of generic levothyroxine.  They gave her diarrhea and loose stools.  I am requesting that you please give her brand-name Synthroid so that she does not have this side effect.          Sincerely,        Pia Mar MD

## 2025-06-20 NOTE — TELEPHONE ENCOUNTER
Please see PA encounter from 6/18/2025  PA teams states an appeal letter is needed from PCP for further review    Hans Lee, Clinic RN  [facility]

## 2025-06-24 RX ORDER — LEVOTHYROXINE SODIUM 112 MCG
TABLET ORAL
Qty: 90 TABLET | Refills: 3 | Status: SHIPPED | OUTPATIENT
Start: 2025-06-24

## 2025-06-24 NOTE — TELEPHONE ENCOUNTER
[email or web address]    Home delivery pharmacy for Synthroid.     Notified patient, sent my chart with site for her to enroll.     Paulette Tate CMA

## 2025-06-24 NOTE — TELEPHONE ENCOUNTER
Just following up to see if you could write another letter for the appeal like you did last year on 5/30/2024? We received a fax requesting and I placed it on your desk.

## 2025-07-01 ENCOUNTER — TELEPHONE (OUTPATIENT)
Dept: FAMILY MEDICINE | Facility: CLINIC | Age: 45
End: 2025-07-01
Payer: COMMERCIAL

## 2025-07-01 NOTE — TELEPHONE ENCOUNTER
Medication Appeal Initiation    Medication: SYNTHROID 112 MCG PO TABS  Appeal Start Date:  7/1/2025  Insurance Company: [employer]  Insurance Phone:   Insurance Fax: [phone]  Comments:       SUBMITTED LETTER OF MEDICAL NECESSITY AND CHART NOTES TO INSURANCE VIA CMM

## 2025-07-01 NOTE — TELEPHONE ENCOUNTER
Pt reports worsening migraines recently (last 2-3 months) with new symptoms of vomiting and facial numbness over the last 3 weeks. Pt is also reporting aura. Pt has upcoming neuro appt in September but needs to discuss how to manage migraines until then.   Pt does not currently have migraine. Advised pt if she gets facial numbness and vomiting with migraine between now and visit she should call rn for triage as she might need to be seen in ER to rule out other causes. She reports symptoms only with migraine.   Scheduled appt 7/3 virtual with primary.       Balwinder SMALL RN

## 2025-07-02 NOTE — TELEPHONE ENCOUNTER
MEDICATION APPEAL APPROVED    Medication: SYNTHROID 112 MCG PO TABS  Authorization Effective Date: 6/1/2025  Authorization Expiration Date: 7/1/2026  Appeal Insurance Company: [employer]  Filling Pharmacy: [facility] - [place] [id number]  Patient Notified: YES (notified patient via mychart message)  Comments:

## 2025-07-03 ENCOUNTER — VIRTUAL VISIT (OUTPATIENT)
Dept: FAMILY MEDICINE | Facility: CLINIC | Age: 45
End: 2025-07-03
Payer: COMMERCIAL

## 2025-07-03 DIAGNOSIS — G43.E09 CHRONIC MIGRAINE WITH AURA WITHOUT STATUS MIGRAINOSUS, NOT INTRACTABLE: Primary | ICD-10-CM

## 2025-07-03 DIAGNOSIS — G43.E11 INTRACTABLE CHRONIC MIGRAINE WITH AURA WITH STATUS MIGRAINOSUS: ICD-10-CM

## 2025-07-03 PROCEDURE — 98005 SYNCH AUDIO-VIDEO EST LOW 20: CPT | Performed by: FAMILY MEDICINE

## 2025-07-03 RX ORDER — NEOMYCIN SULFATE, POLYMYXIN B SULFATE, AND DEXAMETHASONE 3.5; 10000; 1 MG/G; [USP'U]/G; MG/G
OINTMENT OPHTHALMIC
COMMUNITY
Start: 2025-05-05

## 2025-07-03 RX ORDER — ONDANSETRON 8 MG/1
8 TABLET, ORALLY DISINTEGRATING ORAL EVERY 8 HOURS PRN
Qty: 20 TABLET | Refills: 1 | Status: SHIPPED | OUTPATIENT
Start: 2025-07-03

## 2025-07-03 RX ORDER — DEXAMETHASONE 4 MG/1
8 TABLET ORAL
Qty: 6 TABLET | Refills: 1 | Status: SHIPPED | OUTPATIENT
Start: 2025-07-03 | End: 2025-07-06

## 2025-07-03 RX ORDER — LITHIUM CARBONATE 300 MG/1
300 TABLET, FILM COATED, EXTENDED RELEASE ORAL 2 TIMES DAILY
COMMUNITY
Start: 2025-04-20

## 2025-07-03 RX ORDER — IPRATROPIUM BROMIDE 21 UG/1
1 SPRAY, METERED NASAL
COMMUNITY
Start: 2025-04-25

## 2025-07-03 RX ORDER — OLOPATADINE HYDROCHLORIDE 2 MG/ML
1 SOLUTION OPHTHALMIC
COMMUNITY
Start: 2025-04-25

## 2025-07-03 RX ORDER — KETOROLAC TROMETHAMINE 10 MG/1
10 TABLET, FILM COATED ORAL EVERY 6 HOURS PRN
Qty: 20 TABLET | Refills: 0 | Status: SHIPPED | OUTPATIENT
Start: 2025-07-03

## 2025-07-03 RX ORDER — HYDROMORPHONE HYDROCHLORIDE 2 MG/1
2 TABLET ORAL EVERY 6 HOURS PRN
Qty: 10 TABLET | Refills: 0 | Status: SHIPPED | OUTPATIENT
Start: 2025-07-03 | End: 2025-07-06

## 2025-07-03 RX ORDER — CYCLOSPORINE 0.5 MG/ML
EMULSION OPHTHALMIC
COMMUNITY
Start: 2025-06-18

## 2025-07-03 NOTE — PROGRESS NOTES
"Kendra is a 44 year old who is being evaluated via a billable video visit.    How would you like to obtain your AVS? MyChart  If the video visit is dropped, the invitation should be resent by: {video visit invitation (Optional) :999482}  Will anyone else be joining your video visit? No  {If patient encounters technical issues they should call [phone] :377172}    {PROVIDER CHARTING PREFERENCE:853641}    Subjective   Kendra is a 44 year old, presenting for the following health issues:  Recheck Medication  {(!) Visit Details have not yet been documented.  Please enter Visit Details and then use this list to pull in documentation. (Optional):978348}    Zomig only works for about 2 hours after taking  New aura symtoms recenlty  Come down from the medication is different.   Headhead ramps back up and sometimes nausea and vomiting.   Not functional as normal due to these daily headache.   Visual issues - seen eye doctor.   Paulette Tate, CMA    Headache are really bad right now she was doing ok she sstarted with the headache then they got more frequent and stronger the headache was coming back she felt kind of hung    History of Present Illness       Headaches:   Since the patient's last clinic visit, headaches are: worsened  The patient is getting headaches:  Daily  She is not able to do normal daily activities when she has a migraine.  The patient is taking the following rescue/relief medications:  Ibuprofen (Advil, Motrin), Tylenol and Zomig   Patient states \"The relief is inconsistent\" from the rescue/relief medications.   The patient is taking the following medications to prevent migraines:  Topomax  In the past 4 weeks, the patient has gone to an Urgent Care or Emergency Room 0 times times due to headaches.    She eats 2-3 servings of fruits and vegetables daily.She consumes 0 sweetened beverage(s) daily.She exercises with enough effort to increase her heart rate 10 to 19 minutes per day.  She exercises with " "enough effort to increase her heart rate 3 or less days per week.   She is taking medications regularly.            {additonal problems for provider to add (Optional):259410}    {ROS Picklists (Optional):334438}      Objective           Vitals:  No vitals were obtained today due to virtual visit.    Physical Exam   {video visit exam brief selected:734796}    {Diagnostic Test Results (Optional):662933}      Video-Visit Details    Type of service:  Video Visit   Originating Location (pt. Location): {video visit patient location:222438::\"Home\"}  {PROVIDER LOCATION On-site should be selected for visits conducted from your clinic location or adjoining [facility] hospital, academic office, or other nearby [facility] building. Off-site should be selected for all other provider locations, including home:063333}  Distant Location (provider location):  {virtual location provider:368315}  Platform used for Video Visit: {Virtual Visit Platforms:700494::\"Oink\"}  Signed Electronically by: Pia Mar MD  {Email feedback regarding this note to [email or web address]   :915576}  " Location (pt. Location): Home    Distant Location (provider location):  On-site  Platform used for Video Visit: Katlyn  Signed Electronically by: Pia Mar MD

## 2025-07-05 ENCOUNTER — PATIENT OUTREACH (OUTPATIENT)
Dept: CARE COORDINATION | Facility: CLINIC | Age: 45
End: 2025-07-05
Payer: COMMERCIAL

## 2025-07-06 NOTE — TELEPHONE ENCOUNTER
I spoke with Kendra.  I have held the spot for her on 7 8 for the 1:00 appointment.  Please schedule her and let her know this is scheduled.. Pia Mar M.D.

## 2025-07-07 ENCOUNTER — HOSPITAL ENCOUNTER (EMERGENCY)
Facility: CLINIC | Age: 45
Discharge: HOME | End: 2025-07-07
Attending: EMERGENCY MEDICINE
Payer: COMMERCIAL

## 2025-07-07 VITALS
OXYGEN SATURATION: 100 % | WEIGHT: 134 LBS | SYSTOLIC BLOOD PRESSURE: 102 MMHG | TEMPERATURE: 98 F | BODY MASS INDEX: 21.53 KG/M2 | DIASTOLIC BLOOD PRESSURE: 67 MMHG | HEIGHT: 66 IN | HEART RATE: 55 BPM | RESPIRATION RATE: 16 BRPM

## 2025-07-07 DIAGNOSIS — G43.E11 INTRACTABLE CHRONIC MIGRAINE WITH AURA WITH STATUS MIGRAINOSUS: ICD-10-CM

## 2025-07-07 DIAGNOSIS — R20.0 NUMBNESS AND TINGLING: ICD-10-CM

## 2025-07-07 DIAGNOSIS — R20.2 NUMBNESS AND TINGLING: ICD-10-CM

## 2025-07-07 DIAGNOSIS — G43.809 OTHER MIGRAINE WITHOUT STATUS MIGRAINOSUS, NOT INTRACTABLE: ICD-10-CM

## 2025-07-07 PROCEDURE — 96365 THER/PROPH/DIAG IV INF INIT: CPT

## 2025-07-07 PROCEDURE — 99284 EMERGENCY DEPT VISIT MOD MDM: CPT | Mod: 25

## 2025-07-07 PROCEDURE — 250N000011 HC RX IP 250 OP 636: Performed by: EMERGENCY MEDICINE

## 2025-07-07 PROCEDURE — 96375 TX/PRO/DX INJ NEW DRUG ADDON: CPT

## 2025-07-07 RX ORDER — DEXAMETHASONE 4 MG/1
8 TABLET ORAL
Qty: 6 TABLET | Refills: 1 | Status: SHIPPED | OUTPATIENT
Start: 2025-07-07

## 2025-07-07 RX ORDER — KETOROLAC TROMETHAMINE 15 MG/ML
15 INJECTION, SOLUTION INTRAMUSCULAR; INTRAVENOUS ONCE
Status: COMPLETED | OUTPATIENT
Start: 2025-07-07 | End: 2025-07-07

## 2025-07-07 RX ORDER — METOCLOPRAMIDE HYDROCHLORIDE 5 MG/ML
10 INJECTION INTRAMUSCULAR; INTRAVENOUS ONCE
Status: COMPLETED | OUTPATIENT
Start: 2025-07-07 | End: 2025-07-07

## 2025-07-07 RX ORDER — MAGNESIUM SULFATE 1 G/100ML
1 INJECTION INTRAVENOUS ONCE
Status: COMPLETED | OUTPATIENT
Start: 2025-07-07 | End: 2025-07-07

## 2025-07-07 RX ORDER — DEXAMETHASONE SODIUM PHOSPHATE 10 MG/ML
10 INJECTION, SOLUTION INTRAMUSCULAR; INTRAVENOUS ONCE
Status: COMPLETED | OUTPATIENT
Start: 2025-07-07 | End: 2025-07-07

## 2025-07-07 RX ORDER — DIPHENHYDRAMINE HYDROCHLORIDE 50 MG/ML
25 INJECTION, SOLUTION INTRAMUSCULAR; INTRAVENOUS ONCE
Status: COMPLETED | OUTPATIENT
Start: 2025-07-07 | End: 2025-07-07

## 2025-07-07 RX ADMIN — DEXAMETHASONE SODIUM PHOSPHATE 10 MG: 10 INJECTION, SOLUTION INTRAMUSCULAR; INTRAVENOUS at 16:51

## 2025-07-07 RX ADMIN — MAGNESIUM SULFATE HEPTAHYDRATE 1 G: 1 INJECTION, SOLUTION INTRAVENOUS at 18:00

## 2025-07-07 RX ADMIN — KETOROLAC TROMETHAMINE 15 MG: 15 INJECTION, SOLUTION INTRAMUSCULAR; INTRAVENOUS at 16:49

## 2025-07-07 RX ADMIN — METOCLOPRAMIDE 10 MG: 5 INJECTION, SOLUTION INTRAMUSCULAR; INTRAVENOUS at 17:01

## 2025-07-07 RX ADMIN — DIPHENHYDRAMINE HYDROCHLORIDE 25 MG: 50 INJECTION INTRAMUSCULAR; INTRAVENOUS at 16:51

## 2025-07-07 ASSESSMENT — ACTIVITIES OF DAILY LIVING (ADL)
ADLS_ACUITY_SCORE: 41

## 2025-07-07 ASSESSMENT — COLUMBIA-SUICIDE SEVERITY RATING SCALE - C-SSRS
1. IN THE PAST MONTH, HAVE YOU WISHED YOU WERE DEAD OR WISHED YOU COULD GO TO SLEEP AND NOT WAKE UP?: NO
2. HAVE YOU ACTUALLY HAD ANY THOUGHTS OF KILLING YOURSELF IN THE PAST MONTH?: NO
6. HAVE YOU EVER DONE ANYTHING, STARTED TO DO ANYTHING, OR PREPARED TO DO ANYTHING TO END YOUR LIFE?: NO

## 2025-07-07 NOTE — ED PROVIDER NOTES
BP (!) 158/109   Pulse 69   Temp 98  F (36.7  C) (Tympanic)   Resp 20   SpO2 100%     Kesha Carter is a 44-year-old female presenting with complaints of migraine, paresthesias to the lips and extremities.  Had virtual visit for migraine last week, prescribed Decadron, Toradol and Zofran.  Has taken medications without relief of symptoms.  Today, developed numbness/tingling in her lips and extremities,.  On examination there are no focal deficits.  Given patient's history, I recommended he/she be evaluated in the emergency department.  We discussed that he/she will likely require further testing and/or treatment beyond the capabilities of the current urgent care setting including labs and potential imaging.  Patient expresses understanding of this and agreement with the plan. They were transferred to ED waiting area in stable condition.        Asia Elizondo PA-C  07/07/25 155

## 2025-07-07 NOTE — DISCHARGE INSTRUCTIONS
Follow-up with clinic providers for routine cares.    Follow-up with neurologist on Friday as planned.    Steroid was prescribed for the next 3 days.    You can continue Tylenol, in addition to ibuprofen or Toradol with food.

## 2025-07-07 NOTE — ED PROVIDER NOTES
ED Provider Note  [facility]      History     Chief Complaint   Patient presents with    Headache     HPI  Kesha Carter is a 44 year old female who presents to the emergency department with concerns regarding headache, in addition to perioral numbness and tingling, in addition of bilateral upper extremity numbness, and tingling.  Symptoms have been ongoing over the past many weeks, however worsened since last week.  She was originally prescribed  dexamethasone, in addition to Toradol, and Zofran.  However, after completion of the steroid, she feels like she had worsened headache.  Patient had also been prescribed hydromorphone. other findings of severe global headache.  No weakness of the arms, or legs.  No fall, trauma, or other injury.  Denies any fever, or chills.    She feels overall with upper extremity numbness and tingling, with global headache.        Independent Historian:        Review of External Notes:  Virtual visit from July 3 is reviewed.  Patient prescribed dexamethasone, hydromorphone, and Toradol.      Allergies:  Allergies   Allergen Reactions    Erythromycin Nausea and Vomiting    Other [No Clinical Screening - See Comments]      Hives from a contrast dye given when had a CT scan. Patient unsure of name.     Liquid Adhesive Itching and Rash       Problem List:    Patient Active Problem List    Diagnosis Date Noted    Family history of breast cancer in sister 10/16/2024     Priority: Medium    Acne vulgaris 10/16/2024     Priority: Medium    Acute bilateral low back pain without sciatica 04/26/2024     Priority: Medium    Hx of motion sickness 05/16/2022     Priority: Medium    Irritable bowel syndrome with both constipation and diarrhea 07/19/2019     Priority: Medium     and upper gastric accomodation, per [place] 7/2019       Hypothyroidism 05/10/2013     Priority: Medium    Bipolar disorder, in full remission, most recent episode manic      Priority: Medium      Renuka Giron phd        Adjustment disorder with anxiety 2012     Priority: Medium    CARDIOVASCULAR SCREENING; LDL GOAL LESS THAN 160 2011     Priority: Medium    FAMILY HISTORY OF ENDOCRINE DISEASE( other endo or metabol) 2010     Priority: Medium        Past Medical History:    Past Medical History:   Diagnosis Date    Thyroid disease        Past Surgical History:    Past Surgical History:   Procedure Laterality Date    ABDOMEN SURGERY  2009    C-Sections  and ,    C/SECTION, LOW TRANSVERSE      , Low Transverse    C/SECTION, LOW TRANSVERSE      , Low Transverse    COSMETIC SURGERY      breast    DILATION AND CURETTAGE, HYSTEROSCOPY, ABLATE ENDOMETRIUM, COMBINED N/A 2022    Procedure: HYSTEROSCOPY, WITH DILATION AND CURETTAGE OF UTERUS, AND ENDOMETRIAL ABLATION USING MYOSURE;  Surgeon: Will Stanford MD;  Location: [facility] OR    LAPAROSCOPIC CHOLECYSTECTOMY N/A 2017    Procedure: LAPAROSCOPIC CHOLECYSTECTOMY;  Surgeon: Tani Brenner MD;  Location: [facility] OR    MAMMOPLASTY AUGMENTATION BILATERAL  2010    Implants.    TUBAL LIGATION      With C/S    ZZC REPAIR CRUCIATE LIGAMENT,KNEE  1996    Left acl repair       Family History:    Family History   Problem Relation Age of Onset    Thyroid Disease Mother     Arthritis Mother     Hypertension Mother     Diabetes Mother     Anxiety Disorder Mother     Obesity Mother     Mental Illness Mother     Obesity Sister     Breast Cancer Sister 40    Thyroid Disease Maternal Grandmother     Hypertension Maternal Grandmother     Obesity Maternal Grandmother     Cardiovascular Maternal Grandfather     Heart Disease Maternal Grandfather     Hypertension Maternal Grandfather     Hypertension Paternal Grandmother     Diabetes Paternal Grandmother     Cerebrovascular Disease Paternal Grandfather     Obesity Sister        Social History:  Marital Status:   [2]  Social History     Tobacco  "Use    Smoking status: Former     Current packs/day: 0.00     Types: Cigarettes     Quit date: 2016     Years since quittin.9    Smokeless tobacco: Never   Vaping Use    Vaping status: Never Used   Substance Use Topics    Alcohol use: Not Currently    Drug use: No        Medications:    albuterol (PROAIR HFA/PROVENTIL HFA/VENTOLIN HFA) 108 (90 Base) MCG/ACT inhaler  cetirizine (ZYRTEC) 10 MG tablet  clonazePAM (KLONOPIN) 1 MG tablet  cyclobenzaprine (FLEXERIL) 5 MG tablet  cycloSPORINE (RESTASIS) 0.05 % ophthalmic emulsion  dexAMETHasone (DECADRON) 4 MG tablet  hydroxychloroquine (PLAQUENIL) 200 MG tablet  ipratropium (ATROVENT) 0.03 % nasal spray  ketorolac (TORADOL) 10 MG tablet  lithium ER (LITHOBID) 300 MG CR tablet  multivitamin w/minerals (THERA-VIT-M) tablet  neomycin-polymyxin-dexAMETHasone (MAXITROL) 3.5-25921-2.1 ophthalmic ointment  norgestimate-ethinyl estradiol (SPRINTEC 28) 0.25-35 MG-MCG tablet  olopatadine (PATADAY) 0.2 % ophthalmic solution  ondansetron (ZOFRAN ODT) 8 MG ODT tab  ondansetron (ZOFRAN ODT) 8 MG ODT tab  Probiotic Product (PROBIOTIC DAILY PO)  scopolamine (TRANSDERM) 1 MG/3DAYS 72 hr patch  spironolactone (ALDACTONE) 50 MG tablet  SYNTHROID 112 MCG tablet  topiramate (TOPAMAX) 100 MG tablet  ZOLMitriptan (ZOMIG) 5 MG tablet          Review of Systems  A medically appropriate review of systems was performed with pertinent positives and negatives noted in the HPI, and all other systems negative.    Physical Exam   Patient Vitals for the past 24 hrs:   BP Temp Temp src Pulse Resp SpO2 Height Weight   25 1802 110/77 -- -- 58 16 100 % -- --   25 1556 (!) 140/92 -- -- 61 16 100 % 1.676 m (5' 6\") 60.8 kg (134 lb)   25 1548 (!) 158/109 98  F (36.7  C) Tympanic 69 20 100 % -- --          Physical Exam  General: alert and in no acute distress on arrival  Head: atraumatic, normocephalic  Lungs:  nonlabored  CV:  extremities warm and perfused  Abd: nondistended  Skin: no " rashes, no diaphoresis and skin color normal  Neuro: Patient awake, alert, speech is fluent, finger to nose normal.  Normal UE and LE strength and sensation  Psychiatric: affect/mood normal,        ED Course                 Procedures                 None         No results found for this or any previous visit (from the past 24 hours).    MEDICATIONS GIVEN IN THE EMERGENCY DEPARTMENT:  Medications   magnesium sulfate 1 g in 100 mL D5W intermittent infusion (1 g Intravenous $New Bag 7/7/25 1800)   ketorolac (TORADOL) injection 15 mg (15 mg Intravenous $Given 7/7/25 1649)   metoclopramide (REGLAN) injection 10 mg (10 mg Intravenous $Given 7/7/25 1701)   dexAMETHasone PF (DECADRON) injection 10 mg (10 mg Intravenous $Given 7/7/25 1651)   diphenhydrAMINE (BENADRYL) injection 25 mg (25 mg Intravenous $Given 7/7/25 1651)           Independent Interpretation (X-rays, CTs, rhythm strip):  None    Consultations/Discussion of Management or Tests:  None       Social Determinants of Health affecting care:         Assessments & Plan (with Medical Decision Making)  44 year old female who presents to the Emergency Department for evaluation of headache, with numbness and tingling of bilateral upper extremities.  Symptoms present over the past number of days, and actually weeks.  Patient arrives afebrile, with normal neurologic exam.    Patient has had similar types of headaches previously, with history of migraine headaches, with sometimes different aura type sensations.  Most notably, patient with bilateral upper extremity numbness and tingling.  No strength differences.  No recent trauma, fall, or injury.  No red flag signs on history, or exam to suggest alternative cause.    Patient was treated with multiple different medications, and upon recheck feels improved.  Once again, no focal neurologic deficits, and therefore I feel it is reasonable for discharge home, and she will follow-up with neurology this coming Friday, in 4  days.  Return precautions are discussed.  Given the fact that patient did have symptomatic improvement with steroids previously, did discuss 3-day course of steroids, and patient will continue on that until follow-up on Friday in clinic.       I have reviewed the nursing notes.    I have reviewed the findings, diagnosis, plan and need for follow up with the patient.         Medical Decision Making  The patient's presentation was of moderate complexity (an undiagnosed new problem with uncertain prognosis).    The patient's evaluation involved:  See note above for review of data elements    The patient's management necessitated moderate risk (prescription drug management including medications given in the ED).        NEW PRESCRIPTIONS STARTED AT TODAY'S ER VISIT  Current Discharge Medication List          Final diagnoses:   Other migraine without status migrainosus, not intractable   Numbness and tingling       7/7/2025   [facility] EMERGENCY DEPT       Justo Bartlett MD  07/07/25 6132

## 2025-07-08 NOTE — TELEPHONE ENCOUNTER
REASON FOR VISIT: Headache/Migraine    DATE OF APPT: 7/11/2025   NOTES (FOR ALL VISITS) STATUS DETAILS   OFFICE NOTE from referring provider [facility] Deonte Velazquez MD 7/03/2025   MEDICATION LIST N/A    IMAGING  (FOR ALL VISITS)     XR N/A    MRI (HEAD, NECK, SPINE) N/A    CT (HEAD, NECK, SPINE) N/A

## 2025-07-09 ENCOUNTER — LAB (OUTPATIENT)
Dept: LAB | Facility: CLINIC | Age: 45
End: 2025-07-09
Payer: COMMERCIAL

## 2025-07-09 DIAGNOSIS — R94.4 ABNORMAL RENAL FUNCTION TEST: ICD-10-CM

## 2025-07-09 DIAGNOSIS — F31.9 BIPOLAR DISORDER, UNSPECIFIED (H): Primary | ICD-10-CM

## 2025-07-09 DIAGNOSIS — Z79.899 LITHIUM USE: ICD-10-CM

## 2025-07-09 LAB
ANION GAP SERPL CALCULATED.3IONS-SCNC: 8 MMOL/L (ref 7–15)
BUN SERPL-MCNC: 17.7 MG/DL (ref 6–20)
CALCIUM SERPL-MCNC: 9.7 MG/DL (ref 8.8–10.4)
CHLORIDE SERPL-SCNC: 105 MMOL/L (ref 98–107)
CREAT SERPL-MCNC: 1.21 MG/DL (ref 0.51–0.95)
EGFRCR SERPLBLD CKD-EPI 2021: 56 ML/MIN/1.73M2
GLUCOSE SERPL-MCNC: 81 MG/DL (ref 70–99)
HCO3 SERPL-SCNC: 22 MMOL/L (ref 22–29)
LITHIUM SERPL-SCNC: 1.19 MMOL/L (ref 0.6–1.2)
POTASSIUM SERPL-SCNC: 4.6 MMOL/L (ref 3.4–5.3)
SODIUM SERPL-SCNC: 135 MMOL/L (ref 135–145)

## 2025-07-09 PROCEDURE — 36415 COLL VENOUS BLD VENIPUNCTURE: CPT

## 2025-07-09 PROCEDURE — 80048 BASIC METABOLIC PNL TOTAL CA: CPT

## 2025-07-09 PROCEDURE — 80178 ASSAY OF LITHIUM: CPT

## 2025-07-11 ENCOUNTER — OFFICE VISIT (OUTPATIENT)
Dept: NEUROLOGY | Facility: CLINIC | Age: 45
End: 2025-07-11
Attending: FAMILY MEDICINE
Payer: COMMERCIAL

## 2025-07-11 ENCOUNTER — PRE VISIT (OUTPATIENT)
Dept: NEUROLOGY | Facility: CLINIC | Age: 45
End: 2025-07-11

## 2025-07-11 VITALS
SYSTOLIC BLOOD PRESSURE: 134 MMHG | WEIGHT: 132.2 LBS | HEART RATE: 60 BPM | BODY MASS INDEX: 21.34 KG/M2 | DIASTOLIC BLOOD PRESSURE: 83 MMHG

## 2025-07-11 DIAGNOSIS — G43.E09 CHRONIC MIGRAINE WITH AURA WITHOUT STATUS MIGRAINOSUS, NOT INTRACTABLE: ICD-10-CM

## 2025-07-11 DIAGNOSIS — G43.E11 INTRACTABLE CHRONIC MIGRAINE WITH AURA WITH STATUS MIGRAINOSUS: ICD-10-CM

## 2025-07-11 PROCEDURE — 3075F SYST BP GE 130 - 139MM HG: CPT | Performed by: PSYCHIATRY & NEUROLOGY

## 2025-07-11 PROCEDURE — 3079F DIAST BP 80-89 MM HG: CPT | Performed by: PSYCHIATRY & NEUROLOGY

## 2025-07-11 PROCEDURE — 99205 OFFICE O/P NEW HI 60 MIN: CPT | Performed by: PSYCHIATRY & NEUROLOGY

## 2025-07-11 PROCEDURE — G2211 COMPLEX E/M VISIT ADD ON: HCPCS | Performed by: PSYCHIATRY & NEUROLOGY

## 2025-07-11 RX ORDER — ACETAMINOPHEN 325 MG/1
325-650 TABLET ORAL EVERY 6 HOURS PRN
COMMUNITY

## 2025-07-11 RX ORDER — METHYLPREDNISOLONE 4 MG/1
TABLET ORAL
Qty: 21 TABLET | Refills: 0 | Status: SHIPPED | OUTPATIENT
Start: 2025-07-11

## 2025-07-11 RX ORDER — FROVATRIPTAN SUCCINATE 2.5 MG/1
2.5 TABLET, FILM COATED ORAL
Qty: 12 TABLET | Refills: 5 | Status: SHIPPED | OUTPATIENT
Start: 2025-07-11

## 2025-07-11 NOTE — PROGRESS NOTES
"Neurology History and Physical Exam  [facility]      Patient:Kesha Carter  : 1980  Age: 44 year old  Today's Visit: 2025      History of present illness:      Kesha Carter \"Anthony" is a 44 year old female with history of headaches.  She reports having headaches since her 20s, she states they started after her cholecystectomy.    In , she had status migrainous.  At that time, she had a headache in forehead. She had the aura of \"shadows\", but did not have nausea and photosensitivity. She was started on topiramate and Indocin in 2023.  Indocin discontinued, she is currently taking topiramate 100 mg daily.  Her headaches got under control for about a year and then she had another flare up.   Currently she has a constant headache and can't tell when it started. She has an aura of shadows around her. In the past couple weeks, she gets tingling in her face around her mouth which comes to her fingers. She wakes up with a pressure-like headache in between her eyebrows. She also has a constant severe headache in one spot at base of her skull, which is sensitive to touch.   She is nauseous.  Then she gets a \"hung over\", is unsteady and has slurred speech.  She also has a milder headache in her whole head, she believes the spot in the base of her skull is the main issue, it pulsates.  She feels foggy, she has photosensitivity.  Resting in a calm dark room helps.  Zomig helps for an hour only.  She takes Tylenol daily.       She went to ER 2025 and was evaluated. She was evaluated for stroke due to perioral numbness and upper extremity numbness/tingling and stroke w/up was negative. It was concluded it was due to her migraine and she was treated for migraine.  She was treated with dexamethasone, Toradol, and Zofran. Patient had also been prescribed hydromorphone.         Past Medical History: hypothyroidism, IBS, Bipolar disorder, undifferentiated connective tissue disease.     Social " History: she works in [employer] and works on computer for long time.   Denies drinking, smoking, illicit drugs, she drinks 1 cup of coffee per day.     Family History: her mother had migraine headaches.       Current Outpatient Medications   Medication Sig Dispense Refill    acetaminophen (TYLENOL) 325 MG tablet Take 325-650 mg by mouth every 6 hours as needed for mild pain.      albuterol (PROAIR HFA/PROVENTIL HFA/VENTOLIN HFA) 108 (90 Base) MCG/ACT inhaler Inhale 2 puffs into the lungs every 6 hours as needed for shortness of breath, wheezing or cough. 8 g 0    cetirizine (ZYRTEC) 10 MG tablet Take 1 tablet (10 mg) by mouth daily      clonazePAM (KLONOPIN) 1 MG tablet       cyclobenzaprine (FLEXERIL) 5 MG tablet Take 1-2 tablets (5-10 mg) by mouth at bedtime as needed for muscle spasms. 60 tablet 3    cycloSPORINE (RESTASIS) 0.05 % ophthalmic emulsion       hydroxychloroquine (PLAQUENIL) 200 MG tablet Take 1 tablet by mouth daily.      ipratropium (ATROVENT) 0.03 % nasal spray Spray 1 spray in nostril.      lithium ER (LITHOBID) 300 MG CR tablet Take 300 mg by mouth 2 times daily. (Patient taking differently: Take 300 mg by mouth daily.)      multivitamin w/minerals (THERA-VIT-M) tablet Take 1 tablet by mouth daily 100 tablet 3    neomycin-polymyxin-dexAMETHasone (MAXITROL) 3.5-53030-2.1 ophthalmic ointment Apply a small amount of ointment into both eyes at bedtime.      norgestimate-ethinyl estradiol (SPRINTEC 28) 0.25-35 MG-MCG tablet Take 1 tablet by mouth daily. Skip placebo as directed. 112 tablet 1    olopatadine (PATADAY) 0.2 % ophthalmic solution Apply 1 drop to eye.      ondansetron (ZOFRAN ODT) 8 MG ODT tab Take 1 tablet (8 mg) by mouth every 8 hours as needed for nausea. 20 tablet 1    Probiotic Product (PROBIOTIC DAILY PO)       scopolamine (TRANSDERM) 1 MG/3DAYS 72 hr patch Place 1 patch onto the skin every 72 hours 4 patch 1    spironolactone (ALDACTONE) 50 MG tablet Take 1 tablet (50 mg) by mouth daily. 90  "tablet 4    SYNTHROID 112 MCG tablet Take 1 tablet (112 mcg) by mouth daily. 90 tablet 3    topiramate (TOPAMAX) 100 MG tablet TAKE 1 TABLET BY MOUTH EVERY DAY 90 tablet 3    ZOLMitriptan (ZOMIG) 5 MG tablet TAKE 1 TABLET(5 MG) BY MOUTH AT ONSET OF HEADACHE FOR MIGRAINE. MAY REPEAT IN 2 HOURS. MAX 2 TABLETS/ 24 HOURS 12 tablet 3    dexAMETHasone (DECADRON) 4 MG tablet Take 2 tablets (8 mg) by mouth daily with food. (Patient not taking: Reported on 7/11/2025) 6 tablet 1    ketorolac (TORADOL) 10 MG tablet Take 1 tablet (10 mg) by mouth every 6 hours as needed for moderate pain. (Patient not taking: Reported on 7/11/2025) 20 tablet 0    ondansetron (ZOFRAN ODT) 8 MG ODT tab Take 1 tablet (8 mg) by mouth 2 times daily 180 tablet 4     Exam:    /83 (BP Location: Right arm, Patient Position: Sitting, Cuff Size: Adult Small)   Pulse 60   Wt 60 kg (132 lb 3.2 oz)   BMI 21.34 kg/m       Wt Readings from Last 5 Encounters:   07/11/25 60 kg (132 lb 3.2 oz)   07/07/25 60.8 kg (134 lb)   10/16/24 60.8 kg (134 lb)   04/17/24 60.3 kg (133 lb)   04/13/24 59 kg (130 lb)     GENERAL APPEARANCE:  Alert, awake, cooperative, in no apparent distress.      NEUROLOGICAL EXAMINATION:   MENTAL STATUS:  Alert and oriented  LANGUAGE/SPEECH:  No aphasia or dysarthria.   CRANIAL NERVES:  Pupils are round and reactive to light.  Extraocular movements are intact.  Facial sensation is intact to light touch.  Face is symmetric.  Hearing is intact to voice.   MOTOR:  Normal tone, bulk and strength 5/5 with no pronator drift.   SENSATION:  Intact to light touch  COORDINATION:  Normal finger to nose.  No dysmetria or tremor.   REFLEXES:  DTRs 2+ symmetric.    GAIT:  Gait and tandem gait are steady.     Assessment/Plan:     Likely migraine headaches: the patient has an aura of \"shadows\" followed by headaches associated with nausea, photosensitivity, mental cloudiness, which improves by resting in a dark quiet room.  Zomig works temporarily.  " She is currently having a constant headache.  She also has some atypical features for migraine headaches.  I discussed doing a brain MRI to rule out lesions or other intracranial abnormalities causing headaches.  I discussed trying Frova instead of Zomig and Ajovy as a migraine prophylactic medication.      - Inject fremanezumab-vfrm (AJOVY) 225 MG/1.5ML SOAJ every 28 days    - Take Frova 2.5 mg, 1 tablet at onset of headache. Do not exceed 2 tabs in 24 hrs.    - Brain MRI     - Medrol Dosepak    - Follow up in 2-3 m          Corin Adams MD

## 2025-07-11 NOTE — LETTER
"2025      Kesha Carter  [address] [facility] [id number]      Dear Colleague,    Thank you for referring your patient, Kesha Carter, to the [facility] [place]. Please see a copy of my visit note below.    Neurology History and Physical Exam  [facility]      Patient:Kesha Carter  : 1980  Age: 44 year old  Today's Visit: 2025      History of present illness:      Kesha Carter \"Kendra\" is a 44 year old female with history of headaches.  She reports having headaches since her 20s, she states they started after her cholecystectomy.    In , she had status migrainous.  At that time, she had a headache in forehead. She had the aura of \"shadows\", but did not have nausea and photosensitivity. She was started on topiramate and Indocin in 2023.  Indocin discontinued, she is currently taking topiramate 100 mg daily.  Her headaches got under control for about a year and then she had another flare up.   Currently she has a constant headache and can't tell when it started. She has an aura of shadows around her. In the past couple weeks, she gets tingling in her face around her mouth which comes to her fingers. She wakes up with a pressure-like headache in between her eyebrows. She also has a constant severe headache in one spot at base of her skull, which is sensitive to touch.   She is nauseous.  Then she gets a \"hung over\", is unsteady and has slurred speech.  She also has a milder headache in her whole head, she believes the spot in the base of her skull is the main issue, it pulsates.  She feels foggy, she has photosensitivity.  Resting in a calm dark room helps.  Zomig helps for an hour only.  She takes Tylenol daily.       She went to ER 2025 and was evaluated. She was evaluated for stroke due to perioral numbness and upper extremity numbness/tingling and stroke w/up was negative. It was concluded it was due to her migraine and she was " treated for migraine.  She was treated with dexamethasone, Toradol, and Zofran. Patient had also been prescribed hydromorphone.         Past Medical History: hypothyroidism, IBS, Bipolar disorder, undifferentiated connective tissue disease.     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   Denies drinking, smoking, illicit drugs, she drinks 1 cup of coffee per day.     Family History: her mother had migraine headaches.       Current Outpatient Medications   Medication Sig Dispense Refill     acetaminophen (TYLENOL) 325 MG tablet Take 325-650 mg by mouth every 6 hours as needed for mild pain.       albuterol (PROAIR HFA/PROVENTIL HFA/VENTOLIN HFA) 108 (90 Base) MCG/ACT inhaler Inhale 2 puffs into the lungs every 6 hours as needed for shortness of breath, wheezing or cough. 8 g 0     cetirizine (ZYRTEC) 10 MG tablet Take 1 tablet (10 mg) by mouth daily       clonazePAM (KLONOPIN) 1 MG tablet        cyclobenzaprine (FLEXERIL) 5 MG tablet Take 1-2 tablets (5-10 mg) by mouth at bedtime as needed for muscle spasms. 60 tablet 3     cycloSPORINE (RESTASIS) 0.05 % ophthalmic emulsion        hydroxychloroquine (PLAQUENIL) 200 MG tablet Take 1 tablet by mouth daily.       ipratropium (ATROVENT) 0.03 % nasal spray Spray 1 spray in nostril.       lithium ER (LITHOBID) 300 MG CR tablet Take 300 mg by mouth 2 times daily. (Patient taking differently: Take 300 mg by mouth daily.)       multivitamin w/minerals (THERA-VIT-M) tablet Take 1 tablet by mouth daily 100 tablet 3     neomycin-polymyxin-dexAMETHasone (MAXITROL) 3.5-39679-8.1 ophthalmic ointment Apply a small amount of ointment into both eyes at bedtime.       norgestimate-ethinyl estradiol (SPRINTEC 28) 0.25-35 MG-MCG tablet Take 1 tablet by mouth daily. Skip placebo as directed. 112 tablet 1     olopatadine (PATADAY) 0.2 % ophthalmic solution Apply 1 drop to eye.       ondansetron (ZOFRAN ODT) 8 MG ODT tab Take 1 tablet (8 mg) by mouth every 8 hours  as needed for nausea. 20 tablet 1     Probiotic Product (PROBIOTIC DAILY PO)        scopolamine (TRANSDERM) 1 MG/3DAYS 72 hr patch Place 1 patch onto the skin every 72 hours 4 patch 1     spironolactone (ALDACTONE) 50 MG tablet Take 1 tablet (50 mg) by mouth daily. 90 tablet 4     SYNTHROID 112 MCG tablet Take 1 tablet (112 mcg) by mouth daily. 90 tablet 3     topiramate (TOPAMAX) 100 MG tablet TAKE 1 TABLET BY MOUTH EVERY DAY 90 tablet 3     ZOLMitriptan (ZOMIG) 5 MG tablet TAKE 1 TABLET(5 MG) BY MOUTH AT ONSET OF HEADACHE FOR MIGRAINE. MAY REPEAT IN 2 HOURS. MAX 2 TABLETS/ 24 HOURS 12 tablet 3     dexAMETHasone (DECADRON) 4 MG tablet Take 2 tablets (8 mg) by mouth daily with food. (Patient not taking: Reported on 7/11/2025) 6 tablet 1     ketorolac (TORADOL) 10 MG tablet Take 1 tablet (10 mg) by mouth every 6 hours as needed for moderate pain. (Patient not taking: Reported on 7/11/2025) 20 tablet 0     ondansetron (ZOFRAN ODT) 8 MG ODT tab Take 1 tablet (8 mg) by mouth 2 times daily 180 tablet 4     Exam:    /83 (BP Location: Right arm, Patient Position: Sitting, Cuff Size: Adult Small)   Pulse 60   Wt 60 kg (132 lb 3.2 oz)   BMI 21.34 kg/m       Wt Readings from Last 5 Encounters:   07/11/25 60 kg (132 lb 3.2 oz)   07/07/25 60.8 kg (134 lb)   10/16/24 60.8 kg (134 lb)   04/17/24 60.3 kg (133 lb)   04/13/24 59 kg (130 lb)     GENERAL APPEARANCE:  Alert, awake, cooperative, in no apparent distress.      NEUROLOGICAL EXAMINATION:   MENTAL STATUS:  Alert and oriented  LANGUAGE/SPEECH:  No aphasia or dysarthria.   CRANIAL NERVES:  Pupils are round and reactive to light.  Extraocular movements are intact.  Facial sensation is intact to light touch.  Face is symmetric.  Hearing is intact to voice.   MOTOR:  Normal tone, bulk and strength 5/5 with no pronator drift.   SENSATION:  Intact to light touch  COORDINATION:  Normal finger to nose.  No dysmetria or tremor.   REFLEXES:  DTRs 2+ symmetric.    GAIT:  Gait  "and tandem gait are steady.     Assessment/Plan:     Likely migraine headaches: the patient has an aura of \"shadows\" followed by headaches associated with nausea, photosensitivity, mental cloudiness, which improves by resting in a dark quiet room.  Zomig works temporarily.  She is currently having a constant headache.  She also has some atypical features for migraine headaches.  I discussed doing a brain MRI to rule out lesions or other intracranial abnormalities causing headaches.  I discussed trying Frova instead of Zomig and Ajovy as a migraine prophylactic medication.      - Inject fremanezumab-vfrm (AJOVY) 225 MG/1.5ML SOAJ every 28 days    - Take Frova 2.5 mg, 1 tablet at onset of headache. Do not exceed 2 tabs in 24 hrs.    - Brain MRI     - Medrol Dosepak    - Follow up in 2-3 m          Corin Adams MD      Again, thank you for allowing me to participate in the care of your patient.        Sincerely,        Corin Adams MD    Electronically signed  "

## 2025-07-24 ENCOUNTER — TELEPHONE (OUTPATIENT)
Dept: NEUROLOGY | Facility: CLINIC | Age: 45
End: 2025-07-24

## 2025-07-24 DIAGNOSIS — Z91.041 ALLERGY TO INTRAVENOUS CONTRAST: Primary | ICD-10-CM

## 2025-07-24 RX ORDER — METHYLPREDNISOLONE 32 MG/1
TABLET ORAL
Qty: 2 TABLET | Refills: 0 | Status: SHIPPED | OUTPATIENT
Start: 2025-07-24

## 2025-07-24 NOTE — TELEPHONE ENCOUNTER
methyIPREDNISolone (MEDROL)32mg, Oral  1 dose 12 hours prior to the procedure with IV contrast     methyIPREDNISolone (MEDROL)32mg, Oral  1 dose 2 hours prior to the procedure with IV contrast    Optional: Benadryl capsule 50 mb oral once for for 1 does 1 hour prior

## 2025-07-24 NOTE — TELEPHONE ENCOUNTER
[facility] Call Center    Phone Message    May a detailed message be left on voicemail: yes     Reason for Call: Medication Question or concern regarding medication   Prescription Clarification  Name of Medication: methyIPREDNISolone (MEDROL)32mg, Oral  Prescribing Provider:     Corin Adams MD      Pharmacy: [facility] [id number] [id number] [address]   What on the order needs clarification? Patient has a MRI scheduled 7/29 w/contrast. Patient is allergic to contrast and is requesting the following : methyIPREDNISolone (MEDROL)32mg, Oral  1 dose 12 hours prior to the procedure with IV contrast  Optional: Benadryl capsule 50 mb oral once for for 1 does 1 hour prior    Patient can be reached at        Action Taken: Message routed to:  Other: [facility]     Travel Screening: Not Applicable

## 2025-07-29 ENCOUNTER — HOSPITAL ENCOUNTER (OUTPATIENT)
Dept: MRI IMAGING | Facility: CLINIC | Age: 45
Discharge: HOME | End: 2025-07-29
Attending: PSYCHIATRY & NEUROLOGY
Payer: COMMERCIAL

## 2025-07-29 PROCEDURE — A9585 GADOBUTROL INJECTION: HCPCS | Performed by: PSYCHIATRY & NEUROLOGY

## 2025-07-29 PROCEDURE — 255N000002 HC RX 255 OP 636: Performed by: PSYCHIATRY & NEUROLOGY

## 2025-07-29 PROCEDURE — 70553 MRI BRAIN STEM W/O & W/DYE: CPT

## 2025-07-29 RX ORDER — ONDANSETRON 4 MG/1
4 TABLET, ORALLY DISINTEGRATING ORAL ONCE
Status: DISCONTINUED | OUTPATIENT
Start: 2025-07-29 | End: 2025-07-30 | Stop reason: HOSPADM

## 2025-07-29 RX ORDER — GADOBUTROL 604.72 MG/ML
6 INJECTION INTRAVENOUS ONCE
Status: COMPLETED | OUTPATIENT
Start: 2025-07-29 | End: 2025-07-29

## 2025-07-29 RX ADMIN — GADOBUTROL 6 ML: 604.72 INJECTION INTRAVENOUS at 18:50

## (undated) DEVICE — GLOVE PROTEXIS W/NEU-THERA 7.0  2D73TE70

## (undated) DEVICE — ESU HOLSTER PLASTIC DISP E2400

## (undated) DEVICE — GLOVE EXAM NITRILE MED

## (undated) DEVICE — GOWN IMPERVIOUS SPECIALTY XLG/XLONG 32474

## (undated) DEVICE — SU VICRYL 0 UR-6 27" J603H

## (undated) DEVICE — DEVICE ENDOMETRIAL ABLATION SYS MINERVA HANDPIECE MIN9770

## (undated) DEVICE — Device

## (undated) DEVICE — NDL SPINAL 22GA 3.5" QUINCKE 405181

## (undated) DEVICE — CLIP APPLIER ENDO 10MM M/L 176657

## (undated) DEVICE — STOCKING SLEEVE COMPRESSION CALF LG

## (undated) DEVICE — GOWN LG DISP 9515

## (undated) DEVICE — TUBING SUCTION 12"X1/4" N612

## (undated) DEVICE — TUBING CYSTO/BLADDER IRRIG SET 80" 06544-01

## (undated) DEVICE — SOL NACL 0.9% IRRIG 1000ML BOTTLE 07138-09

## (undated) DEVICE — GOWN XLG DISP 9545

## (undated) DEVICE — ENDO TROCAR 05MM VERSAONE BLADED W/STD FIX CANNULA B5STF

## (undated) DEVICE — GLOVE PROTEXIS W/NEU-THERA 7.5  2D73TE75

## (undated) DEVICE — PREP CHLORHEXIDINE 4% 4OZ (HIBICLENS) 57504

## (undated) DEVICE — PREP CHLORAPREP 26ML TINTED ORANGE  260815

## (undated) DEVICE — SOL WATER IRRIG 1000ML BOTTLE 07139-09

## (undated) DEVICE — ENDO SHEARS RENEW LAP ENDOCUT SCISSOR TIP 16.5MM 3142

## (undated) DEVICE — DRAPE POUCH INSTRUMENT 3 POCKET 1018L

## (undated) DEVICE — ADHESIVE SWIFTSET 0.8ML OCTYL SS6

## (undated) DEVICE — SU VICRYL 4-0 FS-2 27" J422-H

## (undated) DEVICE — ENDO POUCH GOLD 10MM ECATCH 173050G

## (undated) DEVICE — PAD PERI INDIV WRAP 11" 2022

## (undated) DEVICE — GLOVE PROTEXIS BLUE W/NEU-THERA 6.5  2D73EB65

## (undated) DEVICE — SOL NACL 0.9% IRRIG 3000ML BAG 07972-08

## (undated) DEVICE — GLOVE PROTEXIS W/NEU-THERA 6.5  2D73TE65

## (undated) DEVICE — PACK LAPAROSCOPY/PELVISCOPY STD

## (undated) DEVICE — PAD FLOOR SURGISAFE

## (undated) DEVICE — GLOVE PROTEXIS BLUE W/NEU-THERA 7.0  2D73EB70

## (undated) DEVICE — DECANTER VIAL 2006S

## (undated) DEVICE — ENDO CANNULA 05MM VERSAONE UNIVERSAL UNVCA5STF

## (undated) DEVICE — TUBING HYDRO-SURG PLUS LAP IRRIGATOR SUCTION 0026870

## (undated) DEVICE — LUBRICATING JELLY 4.25OZ

## (undated) DEVICE — ENDO TROCAR BLADED 11MM STD VERSAONE B11STF

## (undated) DEVICE — STOCKING SLEEVE COMPRESSION CALF MED

## (undated) DEVICE — ENDO TROCAR BLUNT 100MM W/THRD ANCHOR BLUNTPORT BPT12STS

## (undated) DEVICE — KIT PROCEDURE FLUENT IN/OUT FLOWPAK TISS TRAP FLT-112S

## (undated) RX ORDER — FENTANYL CITRATE 50 UG/ML
INJECTION, SOLUTION INTRAMUSCULAR; INTRAVENOUS
Status: DISPENSED
Start: 2017-01-17

## (undated) RX ORDER — SCOLOPAMINE TRANSDERMAL SYSTEM 1 MG/1
PATCH, EXTENDED RELEASE TRANSDERMAL
Status: DISPENSED
Start: 2017-01-17

## (undated) RX ORDER — HYDROXYZINE HYDROCHLORIDE 50 MG/1
TABLET, FILM COATED ORAL
Status: DISPENSED
Start: 2017-01-17

## (undated) RX ORDER — HYDROCODONE BITARTRATE AND ACETAMINOPHEN 5; 325 MG/1; MG/1
TABLET ORAL
Status: DISPENSED
Start: 2017-01-17

## (undated) RX ORDER — LIDOCAINE HYDROCHLORIDE 10 MG/ML
INJECTION, SOLUTION EPIDURAL; INFILTRATION; INTRACAUDAL; PERINEURAL
Status: DISPENSED
Start: 2017-01-17

## (undated) RX ORDER — PROPOFOL 10 MG/ML
INJECTION, EMULSION INTRAVENOUS
Status: DISPENSED
Start: 2017-01-17

## (undated) RX ORDER — ONDANSETRON 2 MG/ML
INJECTION INTRAMUSCULAR; INTRAVENOUS
Status: DISPENSED
Start: 2017-01-17

## (undated) RX ORDER — ACETAMINOPHEN 325 MG/1
TABLET ORAL
Status: DISPENSED
Start: 2022-02-07

## (undated) RX ORDER — KETOROLAC TROMETHAMINE 30 MG/ML
INJECTION, SOLUTION INTRAMUSCULAR; INTRAVENOUS
Status: DISPENSED
Start: 2022-02-07

## (undated) RX ORDER — GLYCOPYRROLATE 0.2 MG/ML
INJECTION, SOLUTION INTRAMUSCULAR; INTRAVENOUS
Status: DISPENSED
Start: 2017-01-17

## (undated) RX ORDER — OXYCODONE HCL 10 MG/1
TABLET, FILM COATED, EXTENDED RELEASE ORAL
Status: DISPENSED
Start: 2022-02-07

## (undated) RX ORDER — HYDROMORPHONE HYDROCHLORIDE 1 MG/ML
INJECTION, SOLUTION INTRAMUSCULAR; INTRAVENOUS; SUBCUTANEOUS
Status: DISPENSED
Start: 2017-01-17

## (undated) RX ORDER — BUPIVACAINE HYDROCHLORIDE AND EPINEPHRINE 2.5; 5 MG/ML; UG/ML
INJECTION, SOLUTION INFILTRATION; PERINEURAL
Status: DISPENSED
Start: 2017-01-17

## (undated) RX ORDER — DEXAMETHASONE SODIUM PHOSPHATE 4 MG/ML
INJECTION, SOLUTION INTRA-ARTICULAR; INTRALESIONAL; INTRAMUSCULAR; INTRAVENOUS; SOFT TISSUE
Status: DISPENSED
Start: 2017-01-17

## (undated) RX ORDER — GABAPENTIN 300 MG/1
CAPSULE ORAL
Status: DISPENSED
Start: 2022-02-07

## (undated) RX ORDER — BUPIVACAINE HYDROCHLORIDE 5 MG/ML
INJECTION, SOLUTION PERINEURAL
Status: DISPENSED
Start: 2022-02-07

## (undated) RX ORDER — KETOROLAC TROMETHAMINE 30 MG/ML
INJECTION, SOLUTION INTRAMUSCULAR; INTRAVENOUS
Status: DISPENSED
Start: 2017-01-17

## (undated) RX ORDER — FENTANYL CITRATE 50 UG/ML
INJECTION, SOLUTION INTRAMUSCULAR; INTRAVENOUS
Status: DISPENSED
Start: 2022-02-07